# Patient Record
Sex: MALE | Race: WHITE | NOT HISPANIC OR LATINO | Employment: OTHER | URBAN - METROPOLITAN AREA
[De-identification: names, ages, dates, MRNs, and addresses within clinical notes are randomized per-mention and may not be internally consistent; named-entity substitution may affect disease eponyms.]

---

## 2017-06-20 ENCOUNTER — APPOINTMENT (EMERGENCY)
Dept: CT IMAGING | Facility: HOSPITAL | Age: 82
DRG: 871 | End: 2017-06-20
Payer: COMMERCIAL

## 2017-06-20 ENCOUNTER — APPOINTMENT (EMERGENCY)
Dept: RADIOLOGY | Facility: HOSPITAL | Age: 82
DRG: 871 | End: 2017-06-20
Payer: COMMERCIAL

## 2017-06-20 ENCOUNTER — HOSPITAL ENCOUNTER (INPATIENT)
Facility: HOSPITAL | Age: 82
LOS: 4 days | Discharge: HOME HEALTH | DRG: 871 | End: 2017-06-24
Attending: EMERGENCY MEDICINE | Admitting: INTERNAL MEDICINE
Payer: COMMERCIAL

## 2017-06-20 DIAGNOSIS — A41.9 SEVERE SEPSIS (HCC): Primary | ICD-10-CM

## 2017-06-20 DIAGNOSIS — J18.9 PNEUMONIA OF RIGHT UPPER LOBE DUE TO INFECTIOUS ORGANISM: ICD-10-CM

## 2017-06-20 DIAGNOSIS — N17.9 AKI (ACUTE KIDNEY INJURY) (HCC): ICD-10-CM

## 2017-06-20 DIAGNOSIS — R09.02 HYPOXIA: ICD-10-CM

## 2017-06-20 DIAGNOSIS — R65.20 SEVERE SEPSIS (HCC): Primary | ICD-10-CM

## 2017-06-20 PROBLEM — E78.5 HLD (HYPERLIPIDEMIA): Chronic | Status: ACTIVE | Noted: 2017-06-20

## 2017-06-20 PROBLEM — I10 ESSENTIAL HYPERTENSION: Chronic | Status: ACTIVE | Noted: 2017-06-20

## 2017-06-20 LAB
ALBUMIN SERPL BCP-MCNC: 3.2 G/DL (ref 3.5–5)
ALP SERPL-CCNC: 65 U/L (ref 46–116)
ALT SERPL W P-5'-P-CCNC: 25 U/L (ref 12–78)
ANION GAP SERPL CALCULATED.3IONS-SCNC: 10 MMOL/L (ref 4–13)
APTT PPP: 37 SECONDS (ref 23–35)
AST SERPL W P-5'-P-CCNC: 22 U/L (ref 5–45)
ATRIAL RATE: 117 BPM
BASOPHILS # BLD MANUAL: 0 THOUSAND/UL (ref 0–0.1)
BASOPHILS NFR MAR MANUAL: 0 % (ref 0–1)
BILIRUB DIRECT SERPL-MCNC: 0.25 MG/DL (ref 0–0.2)
BILIRUB SERPL-MCNC: 0.8 MG/DL (ref 0.2–1)
BUN SERPL-MCNC: 36 MG/DL (ref 5–25)
CALCIUM SERPL-MCNC: 9 MG/DL (ref 8.3–10.1)
CHLORIDE SERPL-SCNC: 100 MMOL/L (ref 100–108)
CO2 SERPL-SCNC: 25 MMOL/L (ref 21–32)
CREAT SERPL-MCNC: 2.07 MG/DL (ref 0.6–1.3)
EOSINOPHIL # BLD MANUAL: 0 THOUSAND/UL (ref 0–0.4)
EOSINOPHIL NFR BLD MANUAL: 0 % (ref 0–6)
ERYTHROCYTE [DISTWIDTH] IN BLOOD BY AUTOMATED COUNT: 12.8 % (ref 11.6–15.1)
GFR SERPL CREATININE-BSD FRML MDRD: 30.3 ML/MIN/1.73SQ M
GLUCOSE SERPL-MCNC: 101 MG/DL (ref 65–140)
HCT VFR BLD AUTO: 41.7 % (ref 36.5–49.3)
HGB BLD-MCNC: 14 G/DL (ref 12–17)
INR PPP: 1.02 (ref 0.86–1.16)
LACTATE SERPL-SCNC: 2 MMOL/L (ref 0.5–2)
LACTATE SERPL-SCNC: 2.2 MMOL/L (ref 0.5–2)
LYMPHOCYTES # BLD AUTO: 0 % (ref 14–44)
LYMPHOCYTES # BLD AUTO: 0 THOUSAND/UL (ref 0.6–4.47)
MCH RBC QN AUTO: 30.4 PG (ref 26.8–34.3)
MCHC RBC AUTO-ENTMCNC: 33.6 G/DL (ref 31.4–37.4)
MCV RBC AUTO: 91 FL (ref 82–98)
MONOCYTES # BLD AUTO: 0.28 THOUSAND/UL (ref 0–1.22)
MONOCYTES NFR BLD: 1 % (ref 4–12)
NEUTROPHILS # BLD MANUAL: 27.86 THOUSAND/UL (ref 1.85–7.62)
NEUTS BAND NFR BLD MANUAL: 14 % (ref 0–8)
NEUTS SEG NFR BLD AUTO: 85 % (ref 43–75)
NT-PROBNP SERPL-MCNC: 277 PG/ML
P AXIS: 41 DEGREES
PLATELET # BLD AUTO: 267 THOUSANDS/UL (ref 149–390)
PLATELET BLD QL SMEAR: ADEQUATE
PMV BLD AUTO: 10.3 FL (ref 8.9–12.7)
POTASSIUM SERPL-SCNC: 4.5 MMOL/L (ref 3.5–5.3)
PR INTERVAL: 152 MS
PROT SERPL-MCNC: 7.4 G/DL (ref 6.4–8.2)
PROTHROMBIN TIME: 13.7 SECONDS (ref 12.1–14.4)
QRS AXIS: 26 DEGREES
QRSD INTERVAL: 102 MS
QT INTERVAL: 320 MS
QTC INTERVAL: 412 MS
RBC # BLD AUTO: 4.6 MILLION/UL (ref 3.88–5.62)
SODIUM SERPL-SCNC: 135 MMOL/L (ref 136–145)
T WAVE AXIS: 32 DEGREES
TOTAL CELLS COUNTED SPEC: 100
TROPONIN I SERPL-MCNC: <0.02 NG/ML
VENTRICULAR RATE: 100 BPM
WBC # BLD AUTO: 28.14 THOUSAND/UL (ref 4.31–10.16)

## 2017-06-20 PROCEDURE — 85027 COMPLETE CBC AUTOMATED: CPT | Performed by: EMERGENCY MEDICINE

## 2017-06-20 PROCEDURE — 93005 ELECTROCARDIOGRAM TRACING: CPT | Performed by: EMERGENCY MEDICINE

## 2017-06-20 PROCEDURE — 94664 DEMO&/EVAL PT USE INHALER: CPT

## 2017-06-20 PROCEDURE — 36415 COLL VENOUS BLD VENIPUNCTURE: CPT | Performed by: EMERGENCY MEDICINE

## 2017-06-20 PROCEDURE — 83605 ASSAY OF LACTIC ACID: CPT | Performed by: EMERGENCY MEDICINE

## 2017-06-20 PROCEDURE — 85007 BL SMEAR W/DIFF WBC COUNT: CPT | Performed by: EMERGENCY MEDICINE

## 2017-06-20 PROCEDURE — 94668 MNPJ CHEST WALL SBSQ: CPT

## 2017-06-20 PROCEDURE — 96360 HYDRATION IV INFUSION INIT: CPT

## 2017-06-20 PROCEDURE — 85730 THROMBOPLASTIN TIME PARTIAL: CPT | Performed by: EMERGENCY MEDICINE

## 2017-06-20 PROCEDURE — 83880 ASSAY OF NATRIURETIC PEPTIDE: CPT | Performed by: EMERGENCY MEDICINE

## 2017-06-20 PROCEDURE — 87040 BLOOD CULTURE FOR BACTERIA: CPT | Performed by: EMERGENCY MEDICINE

## 2017-06-20 PROCEDURE — 80048 BASIC METABOLIC PNL TOTAL CA: CPT | Performed by: EMERGENCY MEDICINE

## 2017-06-20 PROCEDURE — 71020 HB CHEST X-RAY 2VW FRONTAL&LATL: CPT

## 2017-06-20 PROCEDURE — 84484 ASSAY OF TROPONIN QUANT: CPT | Performed by: EMERGENCY MEDICINE

## 2017-06-20 PROCEDURE — 99285 EMERGENCY DEPT VISIT HI MDM: CPT

## 2017-06-20 PROCEDURE — 94760 N-INVAS EAR/PLS OXIMETRY 1: CPT

## 2017-06-20 PROCEDURE — 96365 THER/PROPH/DIAG IV INF INIT: CPT

## 2017-06-20 PROCEDURE — 93005 ELECTROCARDIOGRAM TRACING: CPT

## 2017-06-20 PROCEDURE — 85610 PROTHROMBIN TIME: CPT | Performed by: EMERGENCY MEDICINE

## 2017-06-20 PROCEDURE — 70450 CT HEAD/BRAIN W/O DYE: CPT

## 2017-06-20 PROCEDURE — 80076 HEPATIC FUNCTION PANEL: CPT | Performed by: EMERGENCY MEDICINE

## 2017-06-20 RX ORDER — HEPARIN SODIUM 5000 [USP'U]/ML
5000 INJECTION, SOLUTION INTRAVENOUS; SUBCUTANEOUS EVERY 8 HOURS SCHEDULED
Status: DISCONTINUED | OUTPATIENT
Start: 2017-06-20 | End: 2017-06-24 | Stop reason: HOSPADM

## 2017-06-20 RX ORDER — ACETAMINOPHEN 325 MG/1
975 TABLET ORAL ONCE
Status: COMPLETED | OUTPATIENT
Start: 2017-06-20 | End: 2017-06-20

## 2017-06-20 RX ORDER — ACETAMINOPHEN 325 MG/1
650 TABLET ORAL EVERY 6 HOURS PRN
Status: DISCONTINUED | OUTPATIENT
Start: 2017-06-20 | End: 2017-06-24 | Stop reason: HOSPADM

## 2017-06-20 RX ORDER — LEVALBUTEROL 1.25 MG/.5ML
1.25 SOLUTION, CONCENTRATE RESPIRATORY (INHALATION) EVERY 6 HOURS PRN
Status: DISCONTINUED | OUTPATIENT
Start: 2017-06-20 | End: 2017-06-20

## 2017-06-20 RX ORDER — PRAVASTATIN SODIUM 40 MG
40 TABLET ORAL
Status: DISCONTINUED | OUTPATIENT
Start: 2017-06-20 | End: 2017-06-24 | Stop reason: HOSPADM

## 2017-06-20 RX ORDER — ALBUTEROL SULFATE 2.5 MG/3ML
2.5 SOLUTION RESPIRATORY (INHALATION) EVERY 6 HOURS PRN
Status: DISCONTINUED | OUTPATIENT
Start: 2017-06-20 | End: 2017-06-24 | Stop reason: HOSPADM

## 2017-06-20 RX ORDER — AMLODIPINE BESYLATE 5 MG/1
5 TABLET ORAL DAILY
Status: DISCONTINUED | OUTPATIENT
Start: 2017-06-21 | End: 2017-06-23

## 2017-06-20 RX ORDER — SODIUM CHLORIDE 9 MG/ML
20 INJECTION, SOLUTION INTRAVENOUS CONTINUOUS
Status: DISCONTINUED | OUTPATIENT
Start: 2017-06-20 | End: 2017-06-23

## 2017-06-20 RX ORDER — CHLORAL HYDRATE 500 MG
1000 CAPSULE ORAL DAILY
Status: DISCONTINUED | OUTPATIENT
Start: 2017-06-21 | End: 2017-06-24 | Stop reason: HOSPADM

## 2017-06-20 RX ORDER — GUAIFENESIN 600 MG
1200 TABLET, EXTENDED RELEASE 12 HR ORAL EVERY 12 HOURS SCHEDULED
Status: DISCONTINUED | OUTPATIENT
Start: 2017-06-20 | End: 2017-06-21

## 2017-06-20 RX ORDER — PANTOPRAZOLE SODIUM 20 MG/1
20 TABLET, DELAYED RELEASE ORAL
Status: DISCONTINUED | OUTPATIENT
Start: 2017-06-21 | End: 2017-06-24 | Stop reason: HOSPADM

## 2017-06-20 RX ORDER — ASPIRIN 81 MG/1
81 TABLET, CHEWABLE ORAL DAILY
Status: DISCONTINUED | OUTPATIENT
Start: 2017-06-21 | End: 2017-06-24 | Stop reason: HOSPADM

## 2017-06-20 RX ORDER — ONDANSETRON 2 MG/ML
4 INJECTION INTRAMUSCULAR; INTRAVENOUS EVERY 6 HOURS PRN
Status: DISCONTINUED | OUTPATIENT
Start: 2017-06-20 | End: 2017-06-24 | Stop reason: HOSPADM

## 2017-06-20 RX ADMIN — HEPARIN SODIUM 5000 UNITS: 5000 INJECTION, SOLUTION INTRAVENOUS; SUBCUTANEOUS at 17:30

## 2017-06-20 RX ADMIN — ACETAMINOPHEN 975 MG: 325 TABLET, FILM COATED ORAL at 14:05

## 2017-06-20 RX ADMIN — HEPARIN SODIUM 5000 UNITS: 5000 INJECTION, SOLUTION INTRAVENOUS; SUBCUTANEOUS at 21:11

## 2017-06-20 RX ADMIN — SODIUM CHLORIDE 1000 ML: 0.9 INJECTION, SOLUTION INTRAVENOUS at 14:00

## 2017-06-20 RX ADMIN — PRAVASTATIN SODIUM 40 MG: 40 TABLET ORAL at 17:30

## 2017-06-20 RX ADMIN — CEFTRIAXONE SODIUM 1000 MG: 10 INJECTION, POWDER, FOR SOLUTION INTRAVENOUS at 14:05

## 2017-06-20 RX ADMIN — AZITHROMYCIN MONOHYDRATE 500 MG: 500 INJECTION, POWDER, LYOPHILIZED, FOR SOLUTION INTRAVENOUS at 14:50

## 2017-06-20 RX ADMIN — SODIUM CHLORIDE 100 ML/HR: 0.9 INJECTION, SOLUTION INTRAVENOUS at 17:31

## 2017-06-20 RX ADMIN — GUAIFENESIN 1200 MG: 600 TABLET, EXTENDED RELEASE ORAL at 21:11

## 2017-06-20 RX ADMIN — SODIUM CHLORIDE 1000 ML: 0.9 INJECTION, SOLUTION INTRAVENOUS at 13:40

## 2017-06-21 ENCOUNTER — APPOINTMENT (INPATIENT)
Dept: OCCUPATIONAL THERAPY | Facility: HOSPITAL | Age: 82
DRG: 871 | End: 2017-06-21
Payer: COMMERCIAL

## 2017-06-21 LAB
ANION GAP SERPL CALCULATED.3IONS-SCNC: 12 MMOL/L (ref 4–13)
BASOPHILS # BLD AUTO: 0.03 THOUSANDS/ΜL (ref 0–0.1)
BASOPHILS NFR BLD AUTO: 0 % (ref 0–1)
BUN SERPL-MCNC: 36 MG/DL (ref 5–25)
CALCIUM SERPL-MCNC: 8 MG/DL (ref 8.3–10.1)
CHLORIDE SERPL-SCNC: 104 MMOL/L (ref 100–108)
CO2 SERPL-SCNC: 20 MMOL/L (ref 21–32)
CREAT SERPL-MCNC: 1.82 MG/DL (ref 0.6–1.3)
EOSINOPHIL # BLD AUTO: 0.14 THOUSAND/ΜL (ref 0–0.61)
EOSINOPHIL NFR BLD AUTO: 1 % (ref 0–6)
ERYTHROCYTE [DISTWIDTH] IN BLOOD BY AUTOMATED COUNT: 13 % (ref 11.6–15.1)
GFR SERPL CREATININE-BSD FRML MDRD: 35.2 ML/MIN/1.73SQ M
GLUCOSE SERPL-MCNC: 102 MG/DL (ref 65–140)
HCT VFR BLD AUTO: 37.1 % (ref 36.5–49.3)
HGB BLD-MCNC: 12.4 G/DL (ref 12–17)
L PNEUMO1 AG UR QL IA.RAPID: NEGATIVE
LYMPHOCYTES # BLD AUTO: 2.53 THOUSANDS/ΜL (ref 0.6–4.47)
LYMPHOCYTES NFR BLD AUTO: 11 % (ref 14–44)
MCH RBC QN AUTO: 30.6 PG (ref 26.8–34.3)
MCHC RBC AUTO-ENTMCNC: 33.4 G/DL (ref 31.4–37.4)
MCV RBC AUTO: 92 FL (ref 82–98)
MONOCYTES # BLD AUTO: 1.09 THOUSAND/ΜL (ref 0.17–1.22)
MONOCYTES NFR BLD AUTO: 5 % (ref 4–12)
NEUTROPHILS # BLD AUTO: 19.22 THOUSANDS/ΜL (ref 1.85–7.62)
NEUTS SEG NFR BLD AUTO: 83 % (ref 43–75)
PLATELET # BLD AUTO: 221 THOUSANDS/UL (ref 149–390)
PMV BLD AUTO: 10.4 FL (ref 8.9–12.7)
POTASSIUM SERPL-SCNC: 4.1 MMOL/L (ref 3.5–5.3)
RBC # BLD AUTO: 4.05 MILLION/UL (ref 3.88–5.62)
S PNEUM AG UR QL: NEGATIVE
SODIUM SERPL-SCNC: 136 MMOL/L (ref 136–145)
WBC # BLD AUTO: 23.01 THOUSAND/UL (ref 4.31–10.16)

## 2017-06-21 PROCEDURE — 97535 SELF CARE MNGMENT TRAINING: CPT

## 2017-06-21 PROCEDURE — G8979 MOBILITY GOAL STATUS: HCPCS

## 2017-06-21 PROCEDURE — 85025 COMPLETE CBC W/AUTO DIFF WBC: CPT | Performed by: PHYSICIAN ASSISTANT

## 2017-06-21 PROCEDURE — 80048 BASIC METABOLIC PNL TOTAL CA: CPT | Performed by: PHYSICIAN ASSISTANT

## 2017-06-21 PROCEDURE — 97116 GAIT TRAINING THERAPY: CPT

## 2017-06-21 PROCEDURE — 87449 NOS EACH ORGANISM AG IA: CPT | Performed by: PHYSICIAN ASSISTANT

## 2017-06-21 PROCEDURE — 97163 PT EVAL HIGH COMPLEX 45 MIN: CPT

## 2017-06-21 PROCEDURE — G8988 SELF CARE GOAL STATUS: HCPCS

## 2017-06-21 PROCEDURE — G8978 MOBILITY CURRENT STATUS: HCPCS

## 2017-06-21 PROCEDURE — G8987 SELF CARE CURRENT STATUS: HCPCS

## 2017-06-21 RX ORDER — GUAIFENESIN/DEXTROMETHORPHAN 100-10MG/5
10 SYRUP ORAL EVERY 6 HOURS
Status: DISCONTINUED | OUTPATIENT
Start: 2017-06-21 | End: 2017-06-24 | Stop reason: HOSPADM

## 2017-06-21 RX ADMIN — HEPARIN SODIUM 5000 UNITS: 5000 INJECTION, SOLUTION INTRAVENOUS; SUBCUTANEOUS at 05:33

## 2017-06-21 RX ADMIN — PRAVASTATIN SODIUM 40 MG: 40 TABLET ORAL at 16:14

## 2017-06-21 RX ADMIN — GUAIFENESIN 1200 MG: 600 TABLET, EXTENDED RELEASE ORAL at 08:26

## 2017-06-21 RX ADMIN — ACETAMINOPHEN 650 MG: 325 TABLET, FILM COATED ORAL at 11:47

## 2017-06-21 RX ADMIN — HEPARIN SODIUM 5000 UNITS: 5000 INJECTION, SOLUTION INTRAVENOUS; SUBCUTANEOUS at 14:20

## 2017-06-21 RX ADMIN — PANTOPRAZOLE SODIUM 20 MG: 20 TABLET, DELAYED RELEASE ORAL at 05:33

## 2017-06-21 RX ADMIN — Medication 1000 MG: at 08:25

## 2017-06-21 RX ADMIN — ASPIRIN 81 MG 81 MG: 81 TABLET ORAL at 08:25

## 2017-06-21 RX ADMIN — SODIUM CHLORIDE 100 ML/HR: 0.9 INJECTION, SOLUTION INTRAVENOUS at 10:57

## 2017-06-21 RX ADMIN — AMLODIPINE BESYLATE 5 MG: 5 TABLET ORAL at 08:25

## 2017-06-21 RX ADMIN — AZITHROMYCIN MONOHYDRATE 500 MG: 500 INJECTION, POWDER, LYOPHILIZED, FOR SOLUTION INTRAVENOUS at 12:41

## 2017-06-21 RX ADMIN — SODIUM CHLORIDE 100 ML/HR: 0.9 INJECTION, SOLUTION INTRAVENOUS at 00:50

## 2017-06-21 RX ADMIN — GUAIFENESIN AND DEXTROMETHORPHAN 10 ML: 100; 10 SYRUP ORAL at 18:36

## 2017-06-21 RX ADMIN — CEFTRIAXONE SODIUM 1000 MG: 10 INJECTION, POWDER, FOR SOLUTION INTRAVENOUS at 14:17

## 2017-06-21 RX ADMIN — HEPARIN SODIUM 5000 UNITS: 5000 INJECTION, SOLUTION INTRAVENOUS; SUBCUTANEOUS at 21:17

## 2017-06-21 RX ADMIN — SODIUM CHLORIDE 100 ML/HR: 0.9 INJECTION, SOLUTION INTRAVENOUS at 23:39

## 2017-06-22 LAB
ANION GAP SERPL CALCULATED.3IONS-SCNC: 10 MMOL/L (ref 4–13)
BASOPHILS # BLD AUTO: 0.03 THOUSANDS/ΜL (ref 0–0.1)
BASOPHILS NFR BLD AUTO: 0 % (ref 0–1)
BUN SERPL-MCNC: 24 MG/DL (ref 5–25)
CALCIUM SERPL-MCNC: 8.2 MG/DL (ref 8.3–10.1)
CHLORIDE SERPL-SCNC: 106 MMOL/L (ref 100–108)
CO2 SERPL-SCNC: 21 MMOL/L (ref 21–32)
CREAT SERPL-MCNC: 1.52 MG/DL (ref 0.6–1.3)
EOSINOPHIL # BLD AUTO: 0.24 THOUSAND/ΜL (ref 0–0.61)
EOSINOPHIL NFR BLD AUTO: 2 % (ref 0–6)
ERYTHROCYTE [DISTWIDTH] IN BLOOD BY AUTOMATED COUNT: 12.8 % (ref 11.6–15.1)
GFR SERPL CREATININE-BSD FRML MDRD: 43.3 ML/MIN/1.73SQ M
GLUCOSE SERPL-MCNC: 96 MG/DL (ref 65–140)
HCT VFR BLD AUTO: 33.2 % (ref 36.5–49.3)
HGB BLD-MCNC: 11.2 G/DL (ref 12–17)
LYMPHOCYTES # BLD AUTO: 1.88 THOUSANDS/ΜL (ref 0.6–4.47)
LYMPHOCYTES NFR BLD AUTO: 13 % (ref 14–44)
MCH RBC QN AUTO: 30.8 PG (ref 26.8–34.3)
MCHC RBC AUTO-ENTMCNC: 33.7 G/DL (ref 31.4–37.4)
MCV RBC AUTO: 91 FL (ref 82–98)
MONOCYTES # BLD AUTO: 1.05 THOUSAND/ΜL (ref 0.17–1.22)
MONOCYTES NFR BLD AUTO: 7 % (ref 4–12)
NEUTROPHILS # BLD AUTO: 10.99 THOUSANDS/ΜL (ref 1.85–7.62)
NEUTS SEG NFR BLD AUTO: 78 % (ref 43–75)
PLATELET # BLD AUTO: 205 THOUSANDS/UL (ref 149–390)
PMV BLD AUTO: 10.6 FL (ref 8.9–12.7)
POTASSIUM SERPL-SCNC: 3.8 MMOL/L (ref 3.5–5.3)
RBC # BLD AUTO: 3.64 MILLION/UL (ref 3.88–5.62)
SODIUM SERPL-SCNC: 137 MMOL/L (ref 136–145)
WBC # BLD AUTO: 14.19 THOUSAND/UL (ref 4.31–10.16)

## 2017-06-22 PROCEDURE — 80048 BASIC METABOLIC PNL TOTAL CA: CPT | Performed by: PHYSICIAN ASSISTANT

## 2017-06-22 PROCEDURE — 97530 THERAPEUTIC ACTIVITIES: CPT

## 2017-06-22 PROCEDURE — 85025 COMPLETE CBC W/AUTO DIFF WBC: CPT | Performed by: PHYSICIAN ASSISTANT

## 2017-06-22 PROCEDURE — 97116 GAIT TRAINING THERAPY: CPT

## 2017-06-22 RX ORDER — SACCHAROMYCES BOULARDII 250 MG
250 CAPSULE ORAL 2 TIMES DAILY
Status: DISCONTINUED | OUTPATIENT
Start: 2017-06-22 | End: 2017-06-24 | Stop reason: HOSPADM

## 2017-06-22 RX ADMIN — PRAVASTATIN SODIUM 40 MG: 40 TABLET ORAL at 17:43

## 2017-06-22 RX ADMIN — Medication 1000 MG: at 08:30

## 2017-06-22 RX ADMIN — HEPARIN SODIUM 5000 UNITS: 5000 INJECTION, SOLUTION INTRAVENOUS; SUBCUTANEOUS at 21:41

## 2017-06-22 RX ADMIN — AMLODIPINE BESYLATE 5 MG: 5 TABLET ORAL at 08:30

## 2017-06-22 RX ADMIN — GUAIFENESIN AND DEXTROMETHORPHAN 10 ML: 100; 10 SYRUP ORAL at 01:46

## 2017-06-22 RX ADMIN — ASPIRIN 81 MG 81 MG: 81 TABLET ORAL at 08:30

## 2017-06-22 RX ADMIN — PANTOPRAZOLE SODIUM 20 MG: 20 TABLET, DELAYED RELEASE ORAL at 06:12

## 2017-06-22 RX ADMIN — AZITHROMYCIN MONOHYDRATE 500 MG: 500 INJECTION, POWDER, LYOPHILIZED, FOR SOLUTION INTRAVENOUS at 12:26

## 2017-06-22 RX ADMIN — Medication 250 MG: at 21:41

## 2017-06-22 RX ADMIN — HEPARIN SODIUM 5000 UNITS: 5000 INJECTION, SOLUTION INTRAVENOUS; SUBCUTANEOUS at 06:12

## 2017-06-22 RX ADMIN — CEFTRIAXONE SODIUM 1000 MG: 10 INJECTION, POWDER, FOR SOLUTION INTRAVENOUS at 14:09

## 2017-06-22 RX ADMIN — GUAIFENESIN AND DEXTROMETHORPHAN 10 ML: 100; 10 SYRUP ORAL at 12:23

## 2017-06-22 RX ADMIN — GUAIFENESIN AND DEXTROMETHORPHAN 10 ML: 100; 10 SYRUP ORAL at 06:12

## 2017-06-22 RX ADMIN — GUAIFENESIN AND DEXTROMETHORPHAN 10 ML: 100; 10 SYRUP ORAL at 17:43

## 2017-06-23 PROCEDURE — 97116 GAIT TRAINING THERAPY: CPT

## 2017-06-23 PROCEDURE — 97535 SELF CARE MNGMENT TRAINING: CPT

## 2017-06-23 PROCEDURE — 97530 THERAPEUTIC ACTIVITIES: CPT

## 2017-06-23 RX ORDER — AMLODIPINE BESYLATE 5 MG/1
5 TABLET ORAL DAILY
COMMUNITY
End: 2017-06-24 | Stop reason: HOSPADM

## 2017-06-23 RX ORDER — HYDROCHLOROTHIAZIDE 25 MG/1
25 TABLET ORAL DAILY
COMMUNITY
End: 2017-06-24 | Stop reason: HOSPADM

## 2017-06-23 RX ORDER — ASPIRIN 81 MG/1
81 TABLET ORAL DAILY
COMMUNITY

## 2017-06-23 RX ORDER — AMLODIPINE BESYLATE 5 MG/1
5 TABLET ORAL ONCE
Status: COMPLETED | OUTPATIENT
Start: 2017-06-23 | End: 2017-06-23

## 2017-06-23 RX ORDER — PRAVASTATIN SODIUM 40 MG
40 TABLET ORAL DAILY
COMMUNITY
End: 2021-03-06 | Stop reason: ALTCHOICE

## 2017-06-23 RX ORDER — AMLODIPINE BESYLATE 10 MG/1
10 TABLET ORAL DAILY
Status: DISCONTINUED | OUTPATIENT
Start: 2017-06-24 | End: 2017-06-24 | Stop reason: HOSPADM

## 2017-06-23 RX ORDER — OMEGA-3-ACID ETHYL ESTERS 1 G/1
2 CAPSULE, LIQUID FILLED ORAL 2 TIMES DAILY
COMMUNITY
End: 2018-08-21 | Stop reason: SDUPTHER

## 2017-06-23 RX ORDER — LISINOPRIL 20 MG/1
20 TABLET ORAL 2 TIMES DAILY
COMMUNITY
End: 2017-06-24 | Stop reason: HOSPADM

## 2017-06-23 RX ORDER — DIPHENOXYLATE HYDROCHLORIDE AND ATROPINE SULFATE 2.5; .025 MG/1; MG/1
1 TABLET ORAL DAILY
COMMUNITY
End: 2021-01-10

## 2017-06-23 RX ORDER — OMEPRAZOLE 20 MG/1
20 TABLET, DELAYED RELEASE ORAL DAILY
Status: ON HOLD | COMMUNITY
End: 2018-06-27

## 2017-06-23 RX ADMIN — ACETAMINOPHEN 650 MG: 325 TABLET, FILM COATED ORAL at 15:45

## 2017-06-23 RX ADMIN — Medication 250 MG: at 17:23

## 2017-06-23 RX ADMIN — SODIUM CHLORIDE 20 ML/HR: 0.9 INJECTION, SOLUTION INTRAVENOUS at 06:19

## 2017-06-23 RX ADMIN — GUAIFENESIN AND DEXTROMETHORPHAN 10 ML: 100; 10 SYRUP ORAL at 17:38

## 2017-06-23 RX ADMIN — GUAIFENESIN AND DEXTROMETHORPHAN 10 ML: 100; 10 SYRUP ORAL at 12:37

## 2017-06-23 RX ADMIN — HEPARIN SODIUM 5000 UNITS: 5000 INJECTION, SOLUTION INTRAVENOUS; SUBCUTANEOUS at 14:33

## 2017-06-23 RX ADMIN — PRAVASTATIN SODIUM 40 MG: 40 TABLET ORAL at 15:45

## 2017-06-23 RX ADMIN — ASPIRIN 81 MG 81 MG: 81 TABLET ORAL at 08:37

## 2017-06-23 RX ADMIN — CEFTRIAXONE SODIUM 1000 MG: 10 INJECTION, POWDER, FOR SOLUTION INTRAVENOUS at 13:33

## 2017-06-23 RX ADMIN — AMLODIPINE BESYLATE 5 MG: 5 TABLET ORAL at 10:59

## 2017-06-23 RX ADMIN — AZITHROMYCIN MONOHYDRATE 500 MG: 500 INJECTION, POWDER, LYOPHILIZED, FOR SOLUTION INTRAVENOUS at 12:37

## 2017-06-23 RX ADMIN — PANTOPRAZOLE SODIUM 20 MG: 20 TABLET, DELAYED RELEASE ORAL at 06:09

## 2017-06-23 RX ADMIN — Medication 250 MG: at 08:37

## 2017-06-23 RX ADMIN — GUAIFENESIN AND DEXTROMETHORPHAN 10 ML: 100; 10 SYRUP ORAL at 06:09

## 2017-06-23 RX ADMIN — HEPARIN SODIUM 5000 UNITS: 5000 INJECTION, SOLUTION INTRAVENOUS; SUBCUTANEOUS at 06:09

## 2017-06-23 RX ADMIN — HEPARIN SODIUM 5000 UNITS: 5000 INJECTION, SOLUTION INTRAVENOUS; SUBCUTANEOUS at 21:18

## 2017-06-23 RX ADMIN — AMLODIPINE BESYLATE 5 MG: 5 TABLET ORAL at 08:37

## 2017-06-23 RX ADMIN — Medication 1000 MG: at 08:37

## 2017-06-24 VITALS
HEIGHT: 68 IN | SYSTOLIC BLOOD PRESSURE: 145 MMHG | BODY MASS INDEX: 23.52 KG/M2 | HEART RATE: 100 BPM | WEIGHT: 155.2 LBS | OXYGEN SATURATION: 95 % | DIASTOLIC BLOOD PRESSURE: 86 MMHG | RESPIRATION RATE: 18 BRPM | TEMPERATURE: 98.6 F

## 2017-06-24 LAB
ANION GAP SERPL CALCULATED.3IONS-SCNC: 10 MMOL/L (ref 4–13)
BASOPHILS # BLD AUTO: 0.04 THOUSANDS/ΜL (ref 0–0.1)
BASOPHILS NFR BLD AUTO: 0 % (ref 0–1)
BUN SERPL-MCNC: 20 MG/DL (ref 5–25)
CALCIUM SERPL-MCNC: 8.8 MG/DL (ref 8.3–10.1)
CHLORIDE SERPL-SCNC: 103 MMOL/L (ref 100–108)
CO2 SERPL-SCNC: 23 MMOL/L (ref 21–32)
CREAT SERPL-MCNC: 1.45 MG/DL (ref 0.6–1.3)
EOSINOPHIL # BLD AUTO: 0.43 THOUSAND/ΜL (ref 0–0.61)
EOSINOPHIL NFR BLD AUTO: 4 % (ref 0–6)
ERYTHROCYTE [DISTWIDTH] IN BLOOD BY AUTOMATED COUNT: 12.6 % (ref 11.6–15.1)
GFR SERPL CREATININE-BSD FRML MDRD: 45.7 ML/MIN/1.73SQ M
GLUCOSE SERPL-MCNC: 97 MG/DL (ref 65–140)
HCT VFR BLD AUTO: 35.7 % (ref 36.5–49.3)
HGB BLD-MCNC: 12.1 G/DL (ref 12–17)
LYMPHOCYTES # BLD AUTO: 1.68 THOUSANDS/ΜL (ref 0.6–4.47)
LYMPHOCYTES NFR BLD AUTO: 14 % (ref 14–44)
MCH RBC QN AUTO: 30.5 PG (ref 26.8–34.3)
MCHC RBC AUTO-ENTMCNC: 33.9 G/DL (ref 31.4–37.4)
MCV RBC AUTO: 90 FL (ref 82–98)
MONOCYTES # BLD AUTO: 0.79 THOUSAND/ΜL (ref 0.17–1.22)
MONOCYTES NFR BLD AUTO: 7 % (ref 4–12)
NEUTROPHILS # BLD AUTO: 9.07 THOUSANDS/ΜL (ref 1.85–7.62)
NEUTS SEG NFR BLD AUTO: 75 % (ref 43–75)
PLATELET # BLD AUTO: 258 THOUSANDS/UL (ref 149–390)
PMV BLD AUTO: 9.4 FL (ref 8.9–12.7)
POTASSIUM SERPL-SCNC: 3.8 MMOL/L (ref 3.5–5.3)
RBC # BLD AUTO: 3.97 MILLION/UL (ref 3.88–5.62)
SODIUM SERPL-SCNC: 136 MMOL/L (ref 136–145)
WBC # BLD AUTO: 12.01 THOUSAND/UL (ref 4.31–10.16)

## 2017-06-24 PROCEDURE — 85025 COMPLETE CBC W/AUTO DIFF WBC: CPT | Performed by: INTERNAL MEDICINE

## 2017-06-24 PROCEDURE — 80048 BASIC METABOLIC PNL TOTAL CA: CPT | Performed by: INTERNAL MEDICINE

## 2017-06-24 RX ORDER — GUAIFENESIN/DEXTROMETHORPHAN 100-10MG/5
10 SYRUP ORAL EVERY 6 HOURS
Qty: 118 ML | Refills: 0 | Status: SHIPPED | OUTPATIENT
Start: 2017-06-24 | End: 2018-08-21 | Stop reason: ALTCHOICE

## 2017-06-24 RX ORDER — AZITHROMYCIN 500 MG/1
500 TABLET, FILM COATED ORAL DAILY
Qty: 4 TABLET | Refills: 0 | Status: SHIPPED | OUTPATIENT
Start: 2017-06-24 | End: 2017-06-28

## 2017-06-24 RX ORDER — AMLODIPINE BESYLATE 10 MG/1
10 TABLET ORAL DAILY
Qty: 30 TABLET | Refills: 0 | Status: SHIPPED | OUTPATIENT
Start: 2017-06-24 | End: 2020-04-06 | Stop reason: SDUPTHER

## 2017-06-24 RX ORDER — CEFDINIR 300 MG/1
300 CAPSULE ORAL EVERY 12 HOURS
Qty: 12 CAPSULE | Refills: 0 | Status: SHIPPED | OUTPATIENT
Start: 2017-06-24 | End: 2017-06-30

## 2017-06-24 RX ORDER — ALBUTEROL SULFATE 2.5 MG/3ML
2.5 SOLUTION RESPIRATORY (INHALATION) EVERY 6 HOURS PRN
Qty: 75 ML | Refills: 0 | Status: SHIPPED | OUTPATIENT
Start: 2017-06-24 | End: 2018-08-21 | Stop reason: ALTCHOICE

## 2017-06-24 RX ORDER — PANTOPRAZOLE SODIUM 20 MG/1
20 TABLET, DELAYED RELEASE ORAL
Qty: 30 TABLET | Refills: 0 | Status: SHIPPED | OUTPATIENT
Start: 2017-06-24 | End: 2018-08-21 | Stop reason: ALTCHOICE

## 2017-06-24 RX ORDER — CHLORAL HYDRATE 500 MG
1000 CAPSULE ORAL DAILY
Qty: 30 CAPSULE | Refills: 0 | Status: SHIPPED | OUTPATIENT
Start: 2017-06-24 | End: 2020-01-13 | Stop reason: ALTCHOICE

## 2017-06-24 RX ADMIN — PANTOPRAZOLE SODIUM 20 MG: 20 TABLET, DELAYED RELEASE ORAL at 06:00

## 2017-06-24 RX ADMIN — AZITHROMYCIN MONOHYDRATE 500 MG: 500 INJECTION, POWDER, LYOPHILIZED, FOR SOLUTION INTRAVENOUS at 12:55

## 2017-06-24 RX ADMIN — GUAIFENESIN AND DEXTROMETHORPHAN 10 ML: 100; 10 SYRUP ORAL at 12:55

## 2017-06-24 RX ADMIN — GUAIFENESIN AND DEXTROMETHORPHAN 10 ML: 100; 10 SYRUP ORAL at 01:20

## 2017-06-24 RX ADMIN — Medication 250 MG: at 08:45

## 2017-06-24 RX ADMIN — ASPIRIN 81 MG 81 MG: 81 TABLET ORAL at 08:45

## 2017-06-24 RX ADMIN — AMLODIPINE BESYLATE 10 MG: 10 TABLET ORAL at 08:45

## 2017-06-24 RX ADMIN — HEPARIN SODIUM 5000 UNITS: 5000 INJECTION, SOLUTION INTRAVENOUS; SUBCUTANEOUS at 06:00

## 2017-06-24 RX ADMIN — GUAIFENESIN AND DEXTROMETHORPHAN 10 ML: 100; 10 SYRUP ORAL at 06:00

## 2017-06-24 RX ADMIN — CEFTRIAXONE SODIUM 1000 MG: 10 INJECTION, POWDER, FOR SOLUTION INTRAVENOUS at 14:31

## 2017-06-24 RX ADMIN — Medication 1000 MG: at 08:45

## 2017-06-25 LAB
BACTERIA BLD CULT: NORMAL
BACTERIA BLD CULT: NORMAL

## 2017-12-06 ENCOUNTER — ALLSCRIPTS OFFICE VISIT (OUTPATIENT)
Dept: OTHER | Facility: OTHER | Age: 82
End: 2017-12-06

## 2018-01-22 VITALS
HEART RATE: 60 BPM | SYSTOLIC BLOOD PRESSURE: 160 MMHG | DIASTOLIC BLOOD PRESSURE: 80 MMHG | WEIGHT: 152.38 LBS | HEIGHT: 66 IN | TEMPERATURE: 97.1 F | RESPIRATION RATE: 16 BRPM | OXYGEN SATURATION: 95 % | BODY MASS INDEX: 24.49 KG/M2

## 2018-01-23 NOTE — PROGRESS NOTES
Chief Complaint  PT is here for life care assessment  Patient Care Team    Care Team Member Role Specialty Office Number   Clint Harding MD  Cardiology [phone]   [facility]  Pulmonary Medicine [phone]     Active Problems    1  Bradycardia (427 89) (R00 1)   2  Carotid artery stenosis (433 10) (I65 29)   3  GERD (gastroesophageal reflux disease) (530 81) (K21 9)   4  Hyperlipidemia (272 4) (E78 5)   5  Hypertension (401 9) (I10)   6  Premature atrial contractions (427 61) (I49 1)   7  Sinus bradycardia (427 89) (R00 1)    Past Medical History    · History of Acute kidney injury (584 9) (N17 9)   · History of Community acquired pneumonia (5) (J18 9)   · History of malignant neoplasm of prostate (V10 46) (Z85 46)   · History of shortness of breath (V13 89) (D17 233)   · History of transient cerebral ischemia (V12 54) (Z86 73)    Family History  Mother    · Family history of hypertension (V17 49) (Z82 49)    Social History    · Former smoker    Current Meds   1  AmLODIPine Besylate 5 MG Oral Tablet; Take 1 tablet daily; Therapy: (Recorded:20Apr2015) to Recorded   2  Aspirin 81 MG TABS; TAKE 1 TABLET DAILY; Therapy: (Recorded:20Apr2015) to Recorded   3  Fish Oil CAPS; take 1 capsule daily; Therapy: (Recorded:20Apr2015) to Recorded   4  HydroCHLOROthiazide 25 MG Oral Tablet; Therapy: (Recorded:20Apr2015) to Recorded   5  Lisinopril 20 MG Oral Tablet; Take 1 tablet daily; Therapy: (Recorded:[id number]) to Recorded   6  Omeprazole 20 MG Oral Tablet Delayed Release; Therapy: (Recorded:43Joo5188) to Recorded   7  Pravastatin Sodium 40 MG Oral Tablet; Take 1 tablet daily; Therapy: (Recorded:29Rsu5307) to Recorded    Allergies    1   No Known Drug Allergies    Vitals  Signs    Temperature: 97 1 F, Tympanic  Heart Rate: 60, R Radial  Respiration: 16  Systolic: 744, LUE, Sitting  Diastolic: 80, LUE, Sitting  Height: 5 ft 6 in  Weight: 152 lb 6 oz  BMI Calculated: 24 59  BSA Calculated: 1 78  O2 Saturation: 95    Signatures   Electronically signed by : Chelsy Emanuel DO; Dec  6 2017  4:05PM EST                       (Author)

## 2018-02-28 ENCOUNTER — APPOINTMENT (OUTPATIENT)
Dept: RADIOLOGY | Age: 83
End: 2018-02-28
Payer: COMMERCIAL

## 2018-02-28 ENCOUNTER — TRANSCRIBE ORDERS (OUTPATIENT)
Dept: ADMINISTRATIVE | Age: 83
End: 2018-02-28

## 2018-02-28 DIAGNOSIS — M54.10 RADICULITIS WITH LOWER EXTREMITY SYMPTOMS: Primary | ICD-10-CM

## 2018-02-28 DIAGNOSIS — M54.10 RADICULITIS WITH LOWER EXTREMITY SYMPTOMS: ICD-10-CM

## 2018-02-28 PROCEDURE — 72110 X-RAY EXAM L-2 SPINE 4/>VWS: CPT

## 2018-06-25 ENCOUNTER — HOSPITAL ENCOUNTER (OUTPATIENT)
Facility: HOSPITAL | Age: 83
Setting detail: OBSERVATION
Discharge: HOME | End: 2018-06-27
Attending: EMERGENCY MEDICINE | Admitting: INTERNAL MEDICINE
Payer: COMMERCIAL

## 2018-06-25 ENCOUNTER — APPOINTMENT (EMERGENCY)
Dept: RADIOLOGY | Facility: HOSPITAL | Age: 83
End: 2018-06-25
Payer: COMMERCIAL

## 2018-06-25 ENCOUNTER — APPOINTMENT (EMERGENCY)
Dept: CT IMAGING | Facility: HOSPITAL | Age: 83
End: 2018-06-25
Payer: COMMERCIAL

## 2018-06-25 DIAGNOSIS — R55 NEAR SYNCOPE: ICD-10-CM

## 2018-06-25 DIAGNOSIS — I10 ESSENTIAL HYPERTENSION: Chronic | ICD-10-CM

## 2018-06-25 DIAGNOSIS — N17.9 AKI (ACUTE KIDNEY INJURY) (HCC): Primary | ICD-10-CM

## 2018-06-25 LAB
ALBUMIN SERPL BCP-MCNC: 3.4 G/DL (ref 3.5–5)
ALP SERPL-CCNC: 72 U/L (ref 46–116)
ALT SERPL W P-5'-P-CCNC: 30 U/L (ref 12–78)
ANION GAP SERPL CALCULATED.3IONS-SCNC: 9 MMOL/L (ref 4–13)
AST SERPL W P-5'-P-CCNC: 28 U/L (ref 5–45)
BASOPHILS # BLD AUTO: 0.04 THOUSANDS/ΜL (ref 0–0.1)
BASOPHILS NFR BLD AUTO: 0 % (ref 0–1)
BILIRUB SERPL-MCNC: 0.5 MG/DL (ref 0.2–1)
BILIRUB UR QL STRIP: NEGATIVE
BUN SERPL-MCNC: 37 MG/DL (ref 5–25)
CALCIUM SERPL-MCNC: 8.8 MG/DL (ref 8.3–10.1)
CHLORIDE SERPL-SCNC: 100 MMOL/L (ref 100–108)
CLARITY UR: CLEAR
CO2 SERPL-SCNC: 27 MMOL/L (ref 21–32)
COLOR UR: YELLOW
CREAT SERPL-MCNC: 1.79 MG/DL (ref 0.6–1.3)
EOSINOPHIL # BLD AUTO: 0.32 THOUSAND/ΜL (ref 0–0.61)
EOSINOPHIL NFR BLD AUTO: 2 % (ref 0–6)
ERYTHROCYTE [DISTWIDTH] IN BLOOD BY AUTOMATED COUNT: 13 % (ref 11.6–15.1)
GFR SERPL CREATININE-BSD FRML MDRD: 32 ML/MIN/1.73SQ M
GLUCOSE SERPL-MCNC: 124 MG/DL (ref 65–140)
GLUCOSE UR STRIP-MCNC: NEGATIVE MG/DL
HCT VFR BLD AUTO: 42.1 % (ref 36.5–49.3)
HGB BLD-MCNC: 14.3 G/DL (ref 12–17)
HGB UR QL STRIP.AUTO: NEGATIVE
KETONES UR STRIP-MCNC: NEGATIVE MG/DL
LEUKOCYTE ESTERASE UR QL STRIP: NEGATIVE
LYMPHOCYTES # BLD AUTO: 1.88 THOUSANDS/ΜL (ref 0.6–4.47)
LYMPHOCYTES NFR BLD AUTO: 14 % (ref 14–44)
MCH RBC QN AUTO: 30.7 PG (ref 26.8–34.3)
MCHC RBC AUTO-ENTMCNC: 34 G/DL (ref 31.4–37.4)
MCV RBC AUTO: 90 FL (ref 82–98)
MONOCYTES # BLD AUTO: 0.94 THOUSAND/ΜL (ref 0.17–1.22)
MONOCYTES NFR BLD AUTO: 7 % (ref 4–12)
NEUTROPHILS # BLD AUTO: 10.25 THOUSANDS/ΜL (ref 1.85–7.62)
NEUTS SEG NFR BLD AUTO: 76 % (ref 43–75)
NITRITE UR QL STRIP: NEGATIVE
PH UR STRIP.AUTO: 6 [PH] (ref 4.5–8)
PLATELET # BLD AUTO: 255 THOUSANDS/UL (ref 149–390)
PMV BLD AUTO: 10.3 FL (ref 8.9–12.7)
POTASSIUM SERPL-SCNC: 3.7 MMOL/L (ref 3.5–5.3)
PROT SERPL-MCNC: 7.4 G/DL (ref 6.4–8.2)
PROT UR STRIP-MCNC: ABNORMAL MG/DL
RBC # BLD AUTO: 4.66 MILLION/UL (ref 3.88–5.62)
SODIUM SERPL-SCNC: 136 MMOL/L (ref 136–145)
SP GR UR STRIP.AUTO: 1.02 (ref 1–1.03)
TROPONIN I SERPL-MCNC: <0.02 NG/ML
UROBILINOGEN UR QL STRIP.AUTO: 1 E.U./DL
WBC # BLD AUTO: 13.43 THOUSAND/UL (ref 4.31–10.16)

## 2018-06-25 PROCEDURE — 81001 URINALYSIS AUTO W/SCOPE: CPT

## 2018-06-25 PROCEDURE — 36415 COLL VENOUS BLD VENIPUNCTURE: CPT | Performed by: PHYSICIAN ASSISTANT

## 2018-06-25 PROCEDURE — 71046 X-RAY EXAM CHEST 2 VIEWS: CPT

## 2018-06-25 PROCEDURE — 80053 COMPREHEN METABOLIC PANEL: CPT | Performed by: PHYSICIAN ASSISTANT

## 2018-06-25 PROCEDURE — 74176 CT ABD & PELVIS W/O CONTRAST: CPT

## 2018-06-25 PROCEDURE — 70450 CT HEAD/BRAIN W/O DYE: CPT

## 2018-06-25 PROCEDURE — 85025 COMPLETE CBC W/AUTO DIFF WBC: CPT | Performed by: PHYSICIAN ASSISTANT

## 2018-06-25 PROCEDURE — 84484 ASSAY OF TROPONIN QUANT: CPT | Performed by: PHYSICIAN ASSISTANT

## 2018-06-25 PROCEDURE — 93005 ELECTROCARDIOGRAM TRACING: CPT

## 2018-06-25 RX ORDER — BIOTIN 1 MG
5000 TABLET ORAL
COMMUNITY
End: 2021-03-06 | Stop reason: ALTCHOICE

## 2018-06-25 RX ORDER — LISINOPRIL 5 MG/1
5 TABLET ORAL DAILY
Status: ON HOLD | COMMUNITY
End: 2018-06-27

## 2018-06-25 RX ORDER — HYDROCHLOROTHIAZIDE 25 MG/1
25 TABLET ORAL DAILY
COMMUNITY
End: 2018-06-27 | Stop reason: HOSPADM

## 2018-06-25 RX ORDER — ASPIRIN 81 MG/1
81 TABLET, CHEWABLE ORAL ONCE
Status: COMPLETED | OUTPATIENT
Start: 2018-06-25 | End: 2018-06-25

## 2018-06-25 RX ADMIN — ASPIRIN 81 MG 81 MG: 81 TABLET ORAL at 21:11

## 2018-06-26 PROBLEM — R55 NEAR SYNCOPE: Status: ACTIVE | Noted: 2018-06-26

## 2018-06-26 PROBLEM — D72.829 LEUKOCYTOSIS: Status: ACTIVE | Noted: 2018-06-26

## 2018-06-26 LAB
ANION GAP SERPL CALCULATED.3IONS-SCNC: 9 MMOL/L (ref 4–13)
ATRIAL RATE: 93 BPM
BACTERIA UR QL AUTO: ABNORMAL /HPF
BUN SERPL-MCNC: 36 MG/DL (ref 5–25)
CALCIUM SERPL-MCNC: 8.4 MG/DL (ref 8.3–10.1)
CHLORIDE SERPL-SCNC: 101 MMOL/L (ref 100–108)
CO2 SERPL-SCNC: 27 MMOL/L (ref 21–32)
CREAT SERPL-MCNC: 1.71 MG/DL (ref 0.6–1.3)
ERYTHROCYTE [DISTWIDTH] IN BLOOD BY AUTOMATED COUNT: 12.9 % (ref 11.6–15.1)
GFR SERPL CREATININE-BSD FRML MDRD: 34 ML/MIN/1.73SQ M
GLUCOSE SERPL-MCNC: 102 MG/DL (ref 65–140)
HCT VFR BLD AUTO: 39.9 % (ref 36.5–49.3)
HGB BLD-MCNC: 13.4 G/DL (ref 12–17)
MCH RBC QN AUTO: 30.5 PG (ref 26.8–34.3)
MCHC RBC AUTO-ENTMCNC: 33.6 G/DL (ref 31.4–37.4)
MCV RBC AUTO: 91 FL (ref 82–98)
NON-SQ EPI CELLS URNS QL MICRO: ABNORMAL /HPF
P AXIS: 41 DEGREES
PLATELET # BLD AUTO: 250 THOUSANDS/UL (ref 149–390)
PLATELET # BLD AUTO: 260 THOUSANDS/UL (ref 149–390)
PMV BLD AUTO: 10.1 FL (ref 8.9–12.7)
PMV BLD AUTO: 9.5 FL (ref 8.9–12.7)
POTASSIUM SERPL-SCNC: 3.3 MMOL/L (ref 3.5–5.3)
PR INTERVAL: 132 MS
QRS AXIS: 26 DEGREES
QRSD INTERVAL: 120 MS
QT INTERVAL: 374 MS
QTC INTERVAL: 465 MS
RBC # BLD AUTO: 4.39 MILLION/UL (ref 3.88–5.62)
RBC #/AREA URNS AUTO: ABNORMAL /HPF
SODIUM SERPL-SCNC: 137 MMOL/L (ref 136–145)
T WAVE AXIS: 16 DEGREES
TROPONIN I SERPL-MCNC: 0.02 NG/ML
TROPONIN I SERPL-MCNC: <0.02 NG/ML
TROPONIN I SERPL-MCNC: <0.02 NG/ML
TSH SERPL DL<=0.05 MIU/L-ACNC: 2.69 UIU/ML (ref 0.36–3.74)
VENTRICULAR RATE: 93 BPM
WBC # BLD AUTO: 9.89 THOUSAND/UL (ref 4.31–10.16)
WBC #/AREA URNS AUTO: ABNORMAL /HPF

## 2018-06-26 PROCEDURE — G8979 MOBILITY GOAL STATUS: HCPCS

## 2018-06-26 PROCEDURE — 99220 PR INITIAL OBSERVATION CARE/DAY 70 MINUTES: CPT | Performed by: PHYSICIAN ASSISTANT

## 2018-06-26 PROCEDURE — 85027 COMPLETE CBC AUTOMATED: CPT | Performed by: PHYSICIAN ASSISTANT

## 2018-06-26 PROCEDURE — G8978 MOBILITY CURRENT STATUS: HCPCS

## 2018-06-26 PROCEDURE — 93010 ELECTROCARDIOGRAM REPORT: CPT | Performed by: INTERNAL MEDICINE

## 2018-06-26 PROCEDURE — 99285 EMERGENCY DEPT VISIT HI MDM: CPT

## 2018-06-26 PROCEDURE — 97162 PT EVAL MOD COMPLEX 30 MIN: CPT

## 2018-06-26 PROCEDURE — 84484 ASSAY OF TROPONIN QUANT: CPT | Performed by: PHYSICIAN ASSISTANT

## 2018-06-26 PROCEDURE — 87081 CULTURE SCREEN ONLY: CPT | Performed by: PHYSICIAN ASSISTANT

## 2018-06-26 PROCEDURE — 85049 AUTOMATED PLATELET COUNT: CPT | Performed by: PHYSICIAN ASSISTANT

## 2018-06-26 PROCEDURE — 84484 ASSAY OF TROPONIN QUANT: CPT | Performed by: INTERNAL MEDICINE

## 2018-06-26 PROCEDURE — 84443 ASSAY THYROID STIM HORMONE: CPT | Performed by: PHYSICIAN ASSISTANT

## 2018-06-26 PROCEDURE — 80048 BASIC METABOLIC PNL TOTAL CA: CPT | Performed by: PHYSICIAN ASSISTANT

## 2018-06-26 RX ORDER — AMLODIPINE BESYLATE 10 MG/1
10 TABLET ORAL DAILY
Status: DISCONTINUED | OUTPATIENT
Start: 2018-06-26 | End: 2018-06-27 | Stop reason: HOSPADM

## 2018-06-26 RX ORDER — HEPARIN SODIUM 5000 [USP'U]/ML
5000 INJECTION, SOLUTION INTRAVENOUS; SUBCUTANEOUS EVERY 8 HOURS SCHEDULED
Status: DISCONTINUED | OUTPATIENT
Start: 2018-06-26 | End: 2018-06-27 | Stop reason: HOSPADM

## 2018-06-26 RX ORDER — ASPIRIN 81 MG/1
81 TABLET ORAL DAILY
Status: DISCONTINUED | OUTPATIENT
Start: 2018-06-26 | End: 2018-06-27 | Stop reason: HOSPADM

## 2018-06-26 RX ORDER — ACETAMINOPHEN 325 MG/1
650 TABLET ORAL EVERY 6 HOURS PRN
Status: DISCONTINUED | OUTPATIENT
Start: 2018-06-26 | End: 2018-06-27 | Stop reason: HOSPADM

## 2018-06-26 RX ORDER — PRAVASTATIN SODIUM 40 MG
40 TABLET ORAL DAILY
Status: DISCONTINUED | OUTPATIENT
Start: 2018-06-26 | End: 2018-06-27 | Stop reason: HOSPADM

## 2018-06-26 RX ORDER — POTASSIUM CHLORIDE 20 MEQ/1
20 TABLET, EXTENDED RELEASE ORAL ONCE
Status: COMPLETED | OUTPATIENT
Start: 2018-06-26 | End: 2018-06-26

## 2018-06-26 RX ORDER — CHLORAL HYDRATE 500 MG
1000 CAPSULE ORAL DAILY
Status: DISCONTINUED | OUTPATIENT
Start: 2018-06-26 | End: 2018-06-27 | Stop reason: HOSPADM

## 2018-06-26 RX ORDER — PANTOPRAZOLE SODIUM 20 MG/1
20 TABLET, DELAYED RELEASE ORAL
Status: DISCONTINUED | OUTPATIENT
Start: 2018-06-26 | End: 2018-06-27 | Stop reason: HOSPADM

## 2018-06-26 RX ORDER — ONDANSETRON 2 MG/ML
4 INJECTION INTRAMUSCULAR; INTRAVENOUS EVERY 6 HOURS PRN
Status: DISCONTINUED | OUTPATIENT
Start: 2018-06-26 | End: 2018-06-27 | Stop reason: HOSPADM

## 2018-06-26 RX ADMIN — PANTOPRAZOLE SODIUM 20 MG: 20 TABLET, DELAYED RELEASE ORAL at 05:03

## 2018-06-26 RX ADMIN — ASPIRIN 81 MG: 81 TABLET, COATED ORAL at 08:43

## 2018-06-26 RX ADMIN — AMLODIPINE BESYLATE 10 MG: 10 TABLET ORAL at 08:43

## 2018-06-26 RX ADMIN — PRAVASTATIN SODIUM 40 MG: 40 TABLET ORAL at 08:43

## 2018-06-26 RX ADMIN — SODIUM CHLORIDE 500 ML: 0.9 INJECTION, SOLUTION INTRAVENOUS at 16:34

## 2018-06-26 RX ADMIN — Medication 1 TABLET: at 08:43

## 2018-06-26 RX ADMIN — Medication 1000 MG: at 08:43

## 2018-06-26 RX ADMIN — POTASSIUM CHLORIDE 20 MEQ: 1500 TABLET, EXTENDED RELEASE ORAL at 12:43

## 2018-06-26 RX ADMIN — HEPARIN SODIUM 5000 UNITS: 5000 INJECTION, SOLUTION INTRAVENOUS; SUBCUTANEOUS at 05:02

## 2018-06-26 RX ADMIN — HEPARIN SODIUM 5000 UNITS: 5000 INJECTION, SOLUTION INTRAVENOUS; SUBCUTANEOUS at 14:34

## 2018-06-26 RX ADMIN — ACETAMINOPHEN 650 MG: 325 TABLET, FILM COATED ORAL at 02:52

## 2018-06-26 RX ADMIN — HEPARIN SODIUM 5000 UNITS: 5000 INJECTION, SOLUTION INTRAVENOUS; SUBCUTANEOUS at 21:35

## 2018-06-26 NOTE — CASE MANAGEMENT
Initial Clinical Review    Thank you,  [address] [facility] in the [facility] by Aníbal Taveras for 2017  Network Utilization Review Department  Phone: [phone]; Fax [phone]  ATTENTION: The Network Utilization Review Department is now centralized for our 7 Facilities  Make a note that we have a new phone and fax numbers for our Department  Please call with any questions or concerns to [phone] and carefully follow the prompts so that you are directed to the right person  All voicemails are confidential  Fax any determinations, approvals, denials, and requests for initial or continue stay review clinical to [phone]  Due to HIGH CALL volume, it would be easier if you could please send faxed requests to expedite your requests and in part, help us provide discharge notifications faster  Admission: Date/Time/Statement: Placed in Observation status on 6/25 @ 23:46    Orders Placed This Encounter   Procedures    Place in Observation (expected length of stay for this patient is less than two midnights)     Standing Status:   Standing     Number of Occurrences:   1     Order Specific Question:   Admitting Physician     Answer:   Claudia Blanco [[id number]]     Order Specific Question:   Level of Care     Answer:   Med Surg [16]         ED: Date/Time/Mode of Arrival:   ED Arrival Information     Expected Arrival Acuity Means of Arrival Escorted By Service Admission Type    - 6/25/2018 19:26 Urgent Walk-In Self General Medicine Urgent    Arrival Complaint    -          Chief Complaint:   Chief Complaint   Patient presents with    Syncope     Change in BP medication today for Hypertension, was at living facility when he had a syncopable episiode  No complaints of pain or discomfort, A xO x4       History of Illness: Shoshana Carvajal is a 80 y o  male who presents with near syncope   Patient resides at [facility] in independent living and was sitting at dinner last evening when he began feeling unwell  He notes that people at his table were also commenting that he didn't look well and he states that he just wanted to get back to his room to rest  He reports feeling lightheaded and then was noted to be "out of it " He is unsure whether or not he lost full consciousness but notes that if he did it was only for a few seconds  He reports that staff at the facility checked his blood pressure and noted that it was high  He states that he was otherwise feeling well during the day yesterday  He does note that he was recently seen by his PCP and was started on lisinopril 5 mg daily for hypertension in addition to his HCTZ and amlodipine  He reportedly took the lisinopril yesterday afternoon  ED Vital Signs:   ED Triage Vitals   Temperature Pulse Respirations Blood Pressure SpO2   06/25/18 2311 06/25/18 1935 06/25/18 1935 06/25/18 1935 06/25/18 1935   97 9 °F (36 6 °C) 87 18 152/75 93 % RA sat       Temp Source Heart Rate Source Patient Position - Orthostatic VS BP Location FiO2 (%)   06/25/18 2311 06/25/18 1935 06/25/18 1935 06/25/18 1935 --   Oral Monitor Lying Right arm       Pain Score       06/26/18 0024       No Pain        Wt Readings from Last 1 Encounters:   06/26/18 66 6 kg (146 lb 13 2 oz)       Vital Signs (abnormal):  BP range 121/69 - 160/91   06/25/18 2109  --  100  --  121/69  --  --  --  Standing for 3 minutes - Orthostatic VS   06/25/18 2105  --  --  --  128/71  --  --  --  Standing - Orthostatic VS   06/25/18 2104  --  --  --  123/69  --  --  --  Sitting - Orthostatic VS   06/25/18 2103  --  87  --  128/70  --  94 %  None (Room air)  Lying - Orthostatic VS       Abnormal Labs/Diagnostic Test Results: 6/25 - Bun/cr 37/1 79 - Albumin 3 4 - Trop 0 02-0 02-0 02- Wbc 13 43  Urine  Protein 100  6/26 - K 3 3 - Bun/cr 36/1  71- CBC - normal     CT head - No acute intracranial abnormality   Microangiopathic changes   Chronic appearing right occipital lobe infarction is new from the prior study  CT A & P - Moderate gastric distention may be physiologic, especially if the patient has a recently   An element of gastroparesis however is not excluded  CXR - no acute       ED Treatment:   Medication Administration from 06/25/2018 1926 to 06/26/2018 0049       Date/Time Order Dose Route Action     06/25/2018 2111 aspirin chewable tablet 81 mg 81 mg Oral Given       Past Medical/Surgical History:   Diagnosis    Hyperlipidemia    Hypertension    Stroke [facility])       Admitting Diagnosis: Syncope [R55]  HANNA (acute kidney injury) ([facility] [address] ) [N17 9]  Near syncope [R55]    Age/Sex: 80 y o  male    Assessment/Plan:   Near syncope   Assessment & Plan     · Presented with lightheadedness, episode of near-syncope vs syncope  · Orthostatics obtained in ED were negative  Continue to monitor orthostatics  · Monitor on telemetry for arrhythmias  · CT head: no acute intracranial abnormality  Microangiopathic changes  Chronic appearing right occipital lobe infarction which is new from prior study  · Monitor neuro checks  · Hold HCTZ and lisinopril given HANNA  · Obtain TSH in AM            Leukocytosis   Assessment & Plan     · WBC 13 43   · Possibly reactive  · Patient afebrile  · CT abdomen/ pelvis: moderate gastric distention  · Urine unremarkable  · Obtain repeat CBC in AM           HANNA (acute kidney injury) ([facility] [address] )   Assessment & Plan     · Mild HANNA, POA, with Cr 1 79  · Baseline Cr 1 45-1  55   · Hold nephrotoxic medications including HCTZ and lisinopril  Patient reports occasionally taking Aleve for arthritis pain; change to acetaminophen prn for mild pain  · Monitor intake/output  · Repeat BMP in AM            HLD (hyperlipidemia)   Assessment & Plan     · Continue statin            Essential hypertension   Assessment & Plan     · BP acceptable  · Hold lisinopril and hydrochlorothiazide given HANNA    · Continue amlodipine            Admission Orders:  Scheduled Meds:   Current Facility-Administered Medications:  acetaminophen 650 mg Oral Q6H PRN 6/26 x 1    amLODIPine 10 mg Oral Daily    aspirin 81 mg Oral Daily    fish oil 1,000 mg Oral Daily    heparin (porcine) 5,000 Units Subcutaneous Q8H [address]    multivitamin-minerals 1 tablet Oral Daily    ondansetron 4 mg Intravenous Q6H PRN    pantoprazole 20 mg Oral Early Morning    pravastatin 40 mg Oral Daily      Nursing  orders - Telem - VS q 4 - Neuro checks q 4 - SCD's to le's - Orth BP q shift - Incentive spirometry - Up with assistance - diet cardiac 2 3 gm NA - PT eval

## 2018-06-26 NOTE — ASSESSMENT & PLAN NOTE
· Mild HANNA, POA, with Cr 1 79  · Baseline Cr 1 45-1  55   · Hold nephrotoxic medications including HCTZ and lisinopril  Patient reports occasionally taking Aleve for arthritis pain; change to acetaminophen prn for mild pain  · Monitor intake/output     · Repeat BMP in AM

## 2018-06-26 NOTE — H&P
H&P- Deborah Min 2/16/1927, 80 y o  male MRN: [id number]    Unit/Bed#: -01 Encounter: [id number]    Primary Care Provider: Shaila Lacy MD   Date and time admitted to hospital: 6/25/2018  7:26 PM        * Near syncope   Assessment & Plan    · Presented with lightheadedness, episode of near-syncope vs syncope  · Orthostatics obtained in ED were negative  Continue to monitor orthostatics  · Monitor on telemetry for arrhythmias  · CT head: no acute intracranial abnormality  Microangiopathic changes  Chronic appearing right occipital lobe infarction which is new from prior study  · Monitor neuro checks  · Hold HCTZ and lisinopril given HANNA  · Obtain TSH in AM          Leukocytosis   Assessment & Plan    · WBC 13 43   · Possibly reactive  · Patient afebrile  · CT abdomen/ pelvis: moderate gastric distention  · Urine unremarkable  · Obtain repeat CBC in AM         HANNA (acute kidney injury) ([facility] [address] )   Assessment & Plan    · Mild HANNA, POA, with Cr 1 79  · Baseline Cr 1 45-1  55   · Hold nephrotoxic medications including HCTZ and lisinopril  Patient reports occasionally taking Aleve for arthritis pain; change to acetaminophen prn for mild pain  · Monitor intake/output  · Repeat BMP in AM          HLD (hyperlipidemia)   Assessment & Plan    · Continue statin  Essential hypertension   Assessment & Plan    · BP acceptable  · Hold lisinopril and hydrochlorothiazide given HANNA  · Continue amlodipine          VTE Prophylaxis: Heparin  / sequential compression device   Code Status: Level 1- Full Code  POLST: POLST form is not discussed and not completed at this time  Anticipated Length of Stay:  Patient will be admitted on an Observation basis with an anticipated length of stay of  < 2 midnights  Justification for Hospital Stay: near-syncope    Total Time for Visit, including Counseling / Coordination of Care: 45 minutes    Greater than 50% of this total time spent on direct patient counseling and coordination of care  Chief Complaint:   Near syncope    History of Present Illness:    Ofe Candelaria is a 80 y o  male with a history of hypertension and hyperlipidemia who presents with near syncope  Patient resides at [facility] in independent living and was sitting at dinner last evening when he began feeling unwell  He notes that people at his table were also commenting that he didn't look well and he states that he just wanted to get back to his room to rest  He reports feeling lightheaded and then was noted to be "out of it " He is unsure whether or not he lost full consciousness but notes that if he did it was only for a few seconds  He reports that staff at the facility checked his blood pressure and noted that it was high  He denies any chest pain, SOB prior to the event and also denies recent illness, fevers/ chills, cough, urinary complaints or n/v/d  He states that he was otherwise feeling well during the day yesterday  He does note that he was recently seen by his PCP and was started on lisinopril 5 mg daily for hypertension in addition to his HCTZ and amlodipine  He reportedly took the lisinopril yesterday afternoon  Review of Systems:    Review of Systems   Constitutional: Negative for appetite change, chills and fever  Respiratory: Negative for cough and shortness of breath  Cardiovascular: Negative for chest pain  Gastrointestinal: Negative for diarrhea, nausea and vomiting  Genitourinary: Negative for difficulty urinating  Neurological: Positive for syncope (near) and light-headedness  Negative for headaches  All other systems reviewed and are negative  Past Medical and Surgical History:     Past Medical History:   Diagnosis Date    Hyperlipidemia     Hypertension     Stroke [facility])        History reviewed  No pertinent surgical history  Meds/Allergies:    Prior to Admission medications    Medication Sig Start Date End Date Taking?  Authorizing Provider   amLODIPine (NORVASC) 10 mg tablet Take 1 tablet by mouth daily 6/24/17  Yes Vincent Benson,    aspirin (ECOTRIN LOW STRENGTH) 81 mg EC tablet Take 81 mg by mouth daily   Yes Historical Provider, MD   Cholecalciferol (VITAMIN D3) 1000 units CAPS Take 5,000 Units by mouth   Yes Historical Provider, MD   hydrochlorothiazide (HYDRODIURIL) 25 mg tablet Take 25 mg by mouth daily   Yes Historical Provider, MD   lisinopril (ZESTRIL) 5 mg tablet Take 5 mg by mouth daily   Yes Historical Provider, MD   multivitamin (THERAGRAN) TABS Take 1 tablet by mouth daily With B complex   Yes Historical Provider, MD   Naproxen Sodium (ALEVE PO) Take 250 mg by mouth 2 (two) times a day     Yes Historical Provider, MD   Omega-3 Fatty Acids (FISH OIL) 1,000 mg Take 1 capsule by mouth daily 6/24/17  Yes Vincent Benson DO   omeprazole (PriLOSEC OTC) 20 MG tablet Take 20 mg by mouth daily   Yes Historical Provider, MD   pravastatin (PRAVACHOL) 40 mg tablet Take 40 mg by mouth daily   Yes Historical Provider, MD   albuterol (2 5 mg/3 mL) 0 083 % nebulizer solution Take 3 mL by nebulization every 6 (six) hours as needed for shortness of breath 6/24/17   Vincent Benson DO   calcium-vitamin D 250-100 MG-UNIT per tablet Take 1 tablet by mouth 2 (two) times a day    Historical Provider, MD   dextromethorphan-guaiFENesin (ROBITUSSIN DM)  mg/5 mL syrup Take 10 mL by mouth every 6 (six) hours 6/24/17   Vincent Benson DO   jyurm-3-ozix ethyl esters (LOVAZA) 1 g capsule Take 2 g by mouth 2 (two) times a day    Historical Provider, MD   pantoprazole (PROTONIX) 20 mg tablet Take 1 tablet by mouth daily in the early morning 6/24/17   Vincent Goyal, DO     I have reveiwed home medications using records provided by [facility]  Allergies: No Known Allergies    Social History:     Marital Status:     Patient Pre-hospital Living Situation: [facility], independent living  Patient Pre-hospital Level of Mobility: ambulates without assistance  Patient Pre-hospital Diet Restrictions: None  Substance Use History:   History   Alcohol Use No     History   Smoking Status    Never Smoker   Smokeless Tobacco    Never Used     History   Drug Use No       Family History:    History reviewed  No pertinent family history  Physical Exam:     Vitals:   Blood Pressure: 130/60 (06/26/18 0103)  Pulse: 86 (06/26/18 0103)  Temperature: 98 1 °F (36 7 °C) (06/26/18 0103)  Temp Source: Oral (06/26/18 0103)  Respirations: 20 (06/26/18 0103)  Height: 5' 7" (170 2 cm) (06/26/18 0103)  Weight - Scale: 66 6 kg (146 lb 13 2 oz) (06/26/18 0103)  SpO2: 94 % (06/26/18 0103)    Physical Exam   Constitutional: He is oriented to person, place, and time  He appears well-developed and well-nourished  No distress  Appears younger than stated age    HENT:   Head: Normocephalic and atraumatic  Eyes: Conjunctivae and EOM are normal    Cardiovascular: Normal rate, regular rhythm and normal heart sounds  Pulmonary/Chest: Effort normal and breath sounds normal  No respiratory distress  Abdominal: Soft  Bowel sounds are normal  There is no tenderness  Musculoskeletal: Normal range of motion  He exhibits no edema  Neurological: He is alert and oriented to person, place, and time  No cranial nerve deficit  Skin: Skin is warm and dry  He is not diaphoretic  No erythema  Psychiatric: He has a normal mood and affect  Nursing note and vitals reviewed  Additional Data:     Lab Results: I have personally reviewed pertinent reports          Results from last 7 days  Lab Units 06/25/18 2116   WBC Thousand/uL 13 43*   HEMOGLOBIN g/dL 14 3   HEMATOCRIT % 42 1   PLATELETS Thousands/uL 255   NEUTROS PCT % 76*   LYMPHS PCT % 14   MONOS PCT % 7   EOS PCT % 2       Results from last 7 days  Lab Units 06/25/18 2116   SODIUM mmol/L 136   POTASSIUM mmol/L 3 7   CHLORIDE mmol/L 100   CO2 mmol/L 27   BUN mg/dL 37*   CREATININE mg/dL 1 79*   CALCIUM mg/dL 8 8   TOTAL PROTEIN g/dL 7 4   BILIRUBIN TOTAL mg/dL 0 50   ALK PHOS U/L 72   ALT U/L 30   AST U/L 28   GLUCOSE RANDOM mg/dL 124                   Imaging: I have personally reviewed pertinent reports  CT abdomen pelvis wo contrast   Final Result by Maria E Puga MD (06/25 2222)         1  Moderate gastric distention may be physiologic, especially if the patient has a recently  An element of gastroparesis however is not excluded  Further clinical correlation recommended  Workstation performed: [id number]         CT head without contrast   Final Result by Maria E Puga MD (06/25 2215)      No acute intracranial abnormality  Microangiopathic changes  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

## 2018-06-26 NOTE — ASSESSMENT & PLAN NOTE
· WBC 13 43   · Possibly reactive  · Patient afebrile  · CT abdomen/ pelvis: moderate gastric distention  · Urine unremarkable     · Obtain repeat CBC in AM

## 2018-06-26 NOTE — PHYSICAL THERAPY NOTE
PHYSICAL THERAPY EVALUATION  NAME: Korin Chaudhary  AGE:   80 y o  MRN:  [id number]  ADMIT DX: Syncope [R55]  HANNA (acute kidney injury) ([facility] [address] ) [N17 9]  Near syncope [R55]    PMH:   Past Medical History:   Diagnosis Date    Hyperlipidemia     Hypertension     Stroke ([facility] )      LENGTH OF STAY: 0       18 1455   Pain Assessment   Pain Assessment No/denies pain   Pain Score No Pain   Home Living   Type of Home Apartment  ([address] independent living)   Home Layout One level   Additional Comments Pt ambulates independently without AD at baseline  Prior Function   Level of [place] Independent with ADLs and functional mobility   Lives With Alone   ADL Assistance Independent   IADLs Independent   Falls in the last 6 months 0   Restrictions/Precautions   Weight Bearing Precautions Per Order No   Other Precautions Impulsive; Fall Risk;Hard of hearing;Telemetry   General   Family/Caregiver Present No   Cognition   Overall Cognitive Status WFL   Arousal/Participation Cooperative   Orientation Level Oriented X4   Memory Within functional limits   Following Commands Follows one step commands with increased time or repetition   Comments Pt identified by name and   RLE Assessment   RLE Assessment X   Strength RLE   RLE Overall Strength 4+/5  (functionally)   LLE Assessment   LLE Assessment X   Strength LLE   LLE Overall Strength 4+/5  (functionally)   Bed Mobility   Supine to Sit 6  Modified independent   Additional items HOB elevated; Bedrails   Sit to Supine Unable to assess  (left OOB in bathroom post session with pull cord/RN aware)   Transfers   Sit to Stand 5  Supervision   Additional items Increased time required;Verbal cues; Impulsive   Stand to Sit 5  Supervision   Additional items Increased time required;Verbal cues; Impulsive   Stand pivot 5  Supervision   Additional items Verbal cues; Increased time required   Ambulation/Elevation   Gait pattern Decreased foot clearance; Forward Flexion Gait Assistance 5  Supervision   Additional items Verbal cues   Assistive Device None;Rolling walker  (trials with and without AD)   Distance 120` x1 with RW and 90` x1 without AD   Balance   Static Sitting Good   Dynamic Sitting Fair +   Static Standing Fair +   Dynamic Standing Fair   Ambulatory Fair   Endurance Deficit   Endurance Deficit Yes   Endurance Deficit Description limited ambulation distance   Activity Tolerance   Activity Tolerance Patient limited by fatigue   Nurse Made Aware Per RN, pt appropriate to evaluate   Recommendation   Recommendation Home with family support   Equipment Recommended (pt reports he plans to order a rollator)   Barthel Index   Feeding 10   Bathing 5   Grooming Score 5   Dressing Score 10   Bladder Score 10   Bowels Score 10   Toilet Use Score 5   Transfers (Bed/Chair) Score 10   Mobility (Level Surface) Score 10   Stairs Score 0   Barthel Index Score 75       Assessment: Pt is a 80 y o  male seen for PT evaluation s/p admit to [facility] on 6/25/2018 w/ Near syncope  Order placed for PT  Comorbidities affecting pt's physical performance at time of assessment listed above  Personal factors affecting pt at time of IE include: advanced age  Prior to admission, pt was was independent w/ all functional mobility w/ out AD, lived in one floor environment and lives alone in an independent living apartment  Upon evaluation: Pt requires SBA for bed mobility, SBA for sit to stand, and SBA for ambulation with/without AD  No overt LOB noted  Pt educated on decreasing speed of mobility to increase safety and decrease risk of "lightheadedness/dizziness"  (Please find full objective findings from PT assessment regarding body systems outlined above)  Impairments and limitations also listed above, especially due to  impaired balance, gait deviations, decreased safety awareness and fall risk   The following objective measures performed on IE also reveal limitations: Barthel Index 75/100  Pt's clinical presentation is currently evolving seen in pt's presentation of decreased safety awareness, impulsivity, and fall risk due to lightheadedness/dizziness  No acute skilled PT needs at this time as pt is close to his baseline for functional mobility and reports no needs  From PT/mobility standpoint, recommendation at time of d/c would be home with family support pending progress in order to maximize pt's functional independence and consistency w/ mobility in order to facilitate return to PLOF  Will discharge pt from PT caseload        Tete Ceballos, PT,DPT

## 2018-06-26 NOTE — ASSESSMENT & PLAN NOTE
· Presented with lightheadedness, episode of near-syncope vs syncope  · Orthostatics obtained in ED were negative  Continue to monitor orthostatics  · Monitor on telemetry for arrhythmias  · CT head: no acute intracranial abnormality  Microangiopathic changes  Chronic appearing right occipital lobe infarction which is new from prior study  · Monitor neuro checks  · Hold HCTZ and lisinopril given HANNA     · Obtain TSH in AM

## 2018-06-26 NOTE — PLAN OF CARE
CARDIOVASCULAR - ADULT     Maintains optimal cardiac output and hemodynamic stability Progressing     Absence of cardiac dysrhythmias or at baseline rhythm Progressing        DISCHARGE PLANNING     Discharge to home or other facility with appropriate resources Progressing        INFECTION - ADULT     Absence or prevention of progression during hospitalization Progressing     Absence of fever/infection during neutropenic period Progressing        Knowledge Deficit     Patient/family/caregiver demonstrates understanding of disease process, treatment plan, medications, and discharge instructions Progressing        PAIN - ADULT     Verbalizes/displays adequate comfort level or baseline comfort level Progressing        Potential for Falls     Patient will remain free of falls Progressing        SAFETY ADULT     Patient will remain free of falls Progressing     Maintain or return to baseline ADL function Progressing     Maintain or return mobility status to optimal level Progressing

## 2018-06-26 NOTE — ED PROVIDER NOTES
History  Chief Complaint   Patient presents with    Syncope     Change in BP medication today for Hypertension, was at living facility when he had a syncopable episiode  No complaints of pain or discomfort, A xO x4     80 y o  Male presents for evaluation s/p observed "syncopal episode" while at dinner at his assisted living facility  Notes he did have a new blood pressure medication added today while at PCP  Denies LOC, fall, hitting head, N/V/D, CP, fevers, chills, Diaphoresis, HA, blurry vision, dizziness; States while at dinner he didn't feel well asked to go back to his room, but nurse was called and he was advised to come to ER for evaluation notes " blood pressure was out of whack and they told me I was turning blue "             Prior to Admission Medications   Prescriptions Last Dose Informant Patient Reported? Taking?    Cholecalciferol (VITAMIN D3) 1000 units CAPS 6/25/2018 at Unknown time  Yes Yes   Sig: Take 5,000 Units by mouth   Naproxen Sodium (ALEVE PO) 6/25/2018 at Unknown time  Yes Yes   Sig: Take 250 mg by mouth 2 (two) times a day     Omega-3 Fatty Acids (FISH OIL) 1,000 mg 6/25/2018 at Unknown time  No Yes   Sig: Take 1 capsule by mouth daily   albuterol (2 5 mg/3 mL) 0 083 % nebulizer solution   No No   Sig: Take 3 mL by nebulization every 6 (six) hours as needed for shortness of breath   amLODIPine (NORVASC) 10 mg tablet 6/25/2018 at Unknown time  No Yes   Sig: Take 1 tablet by mouth daily   aspirin (ECOTRIN LOW STRENGTH) 81 mg EC tablet 6/25/2018 at Unknown time  Yes Yes   Sig: Take 81 mg by mouth daily   calcium-vitamin D 250-100 MG-UNIT per tablet   Yes No   Sig: Take 1 tablet by mouth 2 (two) times a day   dextromethorphan-guaiFENesin (ROBITUSSIN DM)  mg/5 mL syrup   No No   Sig: Take 10 mL by mouth every 6 (six) hours   hydrochlorothiazide (HYDRODIURIL) 25 mg tablet 6/25/2018 at Unknown time  Yes Yes   Sig: Take 25 mg by mouth daily   lisinopril (ZESTRIL) 5 mg tablet 6/25/2018 at Unknown time  Yes Yes   Sig: Take 5 mg by mouth daily   multivitamin (THERAGRAN) TABS 6/25/2018 at Unknown time  Yes Yes   Sig: Take 1 tablet by mouth daily With B complex   omega-3-acid ethyl esters (LOVAZA) 1 g capsule   Yes No   Sig: Take 2 g by mouth 2 (two) times a day   pantoprazole (PROTONIX) 20 mg tablet   No No   Sig: Take 1 tablet by mouth daily in the early morning   pravastatin (PRAVACHOL) 40 mg tablet 6/25/2018 at Unknown time  Yes Yes   Sig: Take 40 mg by mouth daily      Facility-Administered Medications: None       Past Medical History:   Diagnosis Date    Hyperlipidemia     Hypertension     Stroke [facility])        History reviewed  No pertinent surgical history  History reviewed  No pertinent family history  I have reviewed and agree with the history as documented  Social History   Substance Use Topics    Smoking status: Never Smoker    Smokeless tobacco: Never Used    Alcohol use No        Review of Systems   Neurological: Positive for dizziness  All other systems reviewed and are negative  Physical Exam  Physical Exam   Constitutional: He is oriented to person, place, and time  He appears well-developed and well-nourished  HENT:   Head: Normocephalic and atraumatic  Right Ear: External ear normal    Left Ear: External ear normal    Nose: Nose normal    Mouth/Throat: Oropharynx is clear and moist    Eyes: Conjunctivae are normal  Pupils are equal, round, and reactive to light  Neck: Normal range of motion  Cardiovascular: Normal rate, regular rhythm, normal heart sounds and intact distal pulses  Pulmonary/Chest: Effort normal and breath sounds normal    Abdominal: Soft  Bowel sounds are normal    Musculoskeletal: Normal range of motion  Neurological: He is alert and oriented to person, place, and time  He displays normal reflexes  No cranial nerve deficit or sensory deficit  He exhibits normal muscle tone  Coordination normal    Skin: Skin is warm   Capillary refill takes less than 2 seconds  Psychiatric: He has a normal mood and affect  His behavior is normal    Nursing note and vitals reviewed        Vital Signs  ED Triage Vitals   Temperature Pulse Respirations Blood Pressure SpO2   06/25/18 2311 06/25/18 1935 06/25/18 1935 06/25/18 1935 06/25/18 1935   97 9 °F (36 6 °C) 87 18 152/75 93 %      Temp Source Heart Rate Source Patient Position - Orthostatic VS BP Location FiO2 (%)   06/25/18 2311 06/25/18 1935 06/25/18 1935 06/25/18 1935 --   Oral Monitor Lying Right arm       Pain Score       06/26/18 0024       No Pain           Vitals:    06/27/18 0833 06/27/18 1142 06/27/18 1341 06/27/18 1345   BP: 162/79 166/84 164/80 160/80   Pulse: 100 95     Patient Position - Orthostatic VS:  Lying         Visual Acuity  Visual Acuity      Most Recent Value   L Pupil Size (mm)  3   R Pupil Size (mm)  3   L Pupil Shape  Round   R Pupil Shape  Round          ED Medications  Medications   aspirin chewable tablet 81 mg (81 mg Oral Given 6/25/18 2111)   potassium chloride (K-DUR,KLOR-CON) CR tablet 20 mEq (20 mEq Oral Given 6/26/18 1243)   sodium chloride 0 9 % bolus 500 mL (500 mL Intravenous New Bag 6/26/18 1634)   lisinopril (ZESTRIL) tablet 2 5 mg (2 5 mg Oral Given 6/27/18 1234)       Diagnostic Studies  Results Reviewed     Procedure Component Value Units Date/Time    Urine Microscopic [[id number]]  (Abnormal) Collected:  06/25/18 2334    Lab Status:  Final result Specimen:  Urine from Urine, Clean Catch Updated:  06/26/18 0012     RBC, UA None Seen /hpf      WBC, UA 0-1 (A) /hpf      Epithelial Cells Occasional /hpf      Bacteria, UA None Seen /hpf     ED Urine Macroscopic [[id number]]  (Abnormal) Collected:  06/25/18 2334    Lab Status:  Final result Specimen:  Urine Updated:  06/25/18 2328     Color, UA Yellow     Clarity, UA Clear     pH, UA 6 0     Leukocytes, UA Negative     Nitrite, UA Negative     Protein,  (2+) (A) mg/dl      Glucose, UA Negative mg/dl      Ketones, UA Negative mg/dl      Urobilinogen, UA 1 0 E U /dl      Bilirubin, UA Negative     Blood, UA Negative     Specific Gravity, UA 1 025    Narrative:       CLINITEK RESULT    Troponin I [[id number]]  (Normal) Collected:  06/25/18 2116    Lab Status:  Final result Specimen:  Blood from Arm, Right Updated:  06/25/18 2154     Troponin I <0 02 ng/mL     Comprehensive metabolic panel [[id number]]  (Abnormal) Collected:  06/25/18 2116    Lab Status:  Final result Specimen:  Blood from Arm, Right Updated:  06/25/18 2152     Sodium 136 mmol/L      Potassium 3 7 mmol/L      Chloride 100 mmol/L      CO2 27 mmol/L      Anion Gap 9 mmol/L      BUN 37 (H) mg/dL      Creatinine 1 79 (H) mg/dL      Glucose 124 mg/dL      Calcium 8 8 mg/dL      AST 28 U/L      ALT 30 U/L      Alkaline Phosphatase 72 U/L      Total Protein 7 4 g/dL      Albumin 3 4 (L) g/dL      Total Bilirubin 0 50 mg/dL      eGFR 32 ml/min/1 73sq m     Narrative:         National Kidney Disease Education Program recommendations are as follows:  GFR calculation is accurate only with a steady state creatinine  Chronic Kidney disease less than 60 ml/min/1 73 sq  meters  Kidney failure less than 15 ml/min/1 73 sq  meters      CBC and differential [[id number]]  (Abnormal) Collected:  06/25/18 2116    Lab Status:  Final result Specimen:  Blood from Arm, Right Updated:  06/25/18 2134     WBC 13 43 (H) Thousand/uL      RBC 4 66 Million/uL      Hemoglobin 14 3 g/dL      Hematocrit 42 1 %      MCV 90 fL      MCH 30 7 pg      MCHC 34 0 g/dL      RDW 13 0 %      MPV 10 3 fL      Platelets 511 Thousands/uL      Neutrophils Relative 76 (H) %      Lymphocytes Relative 14 %      Monocytes Relative 7 %      Eosinophils Relative 2 %      Basophils Relative 0 %      Neutrophils Absolute 10 25 (H) Thousands/µL      Lymphocytes Absolute 1 88 Thousands/µL      Monocytes Absolute 0 94 Thousand/µL      Eosinophils Absolute 0 32 Thousand/µL      Basophils Absolute 0 04 Thousands/µL XR chest 2 views   Final Result by Jessika Damon MD (06/26 9844)      No acute cardiopulmonary disease  Workstation performed: [id number]         CT abdomen pelvis wo contrast   Final Result by Hudson Torre MD (06/25 2222)         1  Moderate gastric distention may be physiologic, especially if the patient has a recently  An element of gastroparesis however is not excluded  Further clinical correlation recommended  Workstation performed: [id number]         CT head without contrast   Final Result by Hudson Torre MD (06/25 2213)      No acute intracranial abnormality  Microangiopathic changes  Chronic appearing right occipital lobe infarction is new from the prior study  Workstation performed: [id number]                    Procedures  Procedures       Phone Contacts  ED Phone Contact    ED Course                               MDM  Number of Diagnoses or Management Options  HANNA (acute kidney injury) [facility]): new and requires workup  Near syncope: new and requires workup  Diagnosis management comments: 80 y o  Male presents for evaluation s/p observed "syncopal episode" while at dinner at his assisted living facility  Notes he did have a new blood pressure medication added today while at PCP  Denies LOC, fall, hitting head, N/V/D, CP, fevers, chills, Diaphoresis, HA, blurry vision, dizziness; States while at dinner he didn't feel well asked to go back to his room, but nurse was called and he was advised to come to ER for evaluation notes " blood pressure was out of whack and they told me I was turning blue "   Pt   Is A&Ox4, without complaints, VSS, afebrile, NAD, PERRLA, EOMI, neck supple, RRR, cap refill brisk, no edema, 2+ pulses, LS CTA B/L, Abd soft NT/ND; CNI,   Will draw labs, ECG, CXR, CTH, CT abdomen pelvis, reassess  CTH WNL, CT A&P WNL, CXr WNL  + HANNA, will give IVF  Will discuss with SLIM for Admission       Amount and/or Complexity of Data Reviewed  Clinical lab tests: ordered and reviewed  Tests in the radiology section of CPT®: ordered and reviewed      CritCare Time    Disposition  Final diagnoses:   HANNA (acute kidney injury) ([facility] [address] )   Near syncope     Time reflects when diagnosis was documented in both MDM as applicable and the Disposition within this note     Time User Action Codes Description Comment    6/25/2018 11:46 PM Yadira Hugo [N17 9] HANNA (acute kidney injury) ([facility] [address] )     6/25/2018 11:47 PM Wally Cranker Add [R55] Near syncope     6/27/2018  1:47 PM Kavin Snyder, Obdulio Galindo M Add [I10] Essential hypertension       ED Disposition     ED Disposition Condition Comment    Admit  Case was discussed with Harish Pettit the patient's admission status was agreed to be Observation tele to the service of Dr Ryan Reyez  Follow-up Information     Follow up With Specialties Details Why Josy Doan MD Internal Medicine Go on 7/10/2018  [address] [place] [address]  Colonel Captain, MD Cardiology, Multidisciplinary Schedule an appointment as soon as possible for a visit Please follow up with cardiology for blood pressure monitoring/management  You may call the number above and ask for soonest available appointment with any provider within the group    [address] [address]  [phone]            Discharge Medication List as of 6/27/2018  2:40 PM      CONTINUE these medications which have CHANGED    Details   lisinopril (ZESTRIL) 5 mg tablet Take 1 5 tablets (7 5 mg total) by mouth daily, Starting Wed 6/27/2018, Print         CONTINUE these medications which have NOT CHANGED    Details   albuterol (2 5 mg/3 mL) 0 083 % nebulizer solution Take 3 mL by nebulization every 6 (six) hours as needed for shortness of breath, Starting Sat 6/24/2017, Normal      amLODIPine (NORVASC) 10 mg tablet Take 1 tablet by mouth daily, Starting Sat 6/24/2017, Normal      aspirin (ECOTRIN LOW STRENGTH) 81 mg EC tablet Take 81 mg by mouth daily, Historical Med      calcium-vitamin D 250-100 MG-UNIT per tablet Take 1 tablet by mouth 2 (two) times a day, Historical Med      Cholecalciferol (VITAMIN D3) 1000 units CAPS Take 5,000 Units by mouth, Historical Med      dextromethorphan-guaiFENesin (ROBITUSSIN DM)  mg/5 mL syrup Take 10 mL by mouth every 6 (six) hours, Starting Sat 6/24/2017, Normal      multivitamin (THERAGRAN) TABS Take 1 tablet by mouth daily With B complex, Historical Med      Naproxen Sodium (ALEVE PO) Take 250 mg by mouth 2 (two) times a day  , Historical Med      Omega-3 Fatty Acids (FISH OIL) 1,000 mg Take 1 capsule by mouth daily, Starting Sat 6/24/2017, Normal      omega-3-acid ethyl esters (LOVAZA) 1 g capsule Take 2 g by mouth 2 (two) times a day, Historical Med      pantoprazole (PROTONIX) 20 mg tablet Take 1 tablet by mouth daily in the early morning, Starting Sat 6/24/2017, Normal      pravastatin (PRAVACHOL) 40 mg tablet Take 40 mg by mouth daily, Historical Med         STOP taking these medications       hydrochlorothiazide (HYDRODIURIL) 25 mg tablet Comments:   Reason for Stopping:               Outpatient Discharge Orders  Discharge Diet     Activity as tolerated     Call provider for:  persistent dizziness or light-headedness     Call provider for:  difficulty breathing, headache or visual disturbances         ED Provider  Electronically Signed by           Murphy Castro PA-C  07/09/18 2808

## 2018-06-27 VITALS
RESPIRATION RATE: 16 BRPM | WEIGHT: 146.83 LBS | OXYGEN SATURATION: 94 % | BODY MASS INDEX: 23.04 KG/M2 | HEIGHT: 67 IN | DIASTOLIC BLOOD PRESSURE: 80 MMHG | HEART RATE: 95 BPM | TEMPERATURE: 97.4 F | SYSTOLIC BLOOD PRESSURE: 160 MMHG

## 2018-06-27 PROBLEM — N17.9 AKI (ACUTE KIDNEY INJURY) (HCC): Status: RESOLVED | Noted: 2017-06-20 | Resolved: 2018-06-27

## 2018-06-27 LAB
ANION GAP SERPL CALCULATED.3IONS-SCNC: 10 MMOL/L (ref 4–13)
BUN SERPL-MCNC: 29 MG/DL (ref 5–25)
CALCIUM SERPL-MCNC: 9 MG/DL (ref 8.3–10.1)
CHLORIDE SERPL-SCNC: 103 MMOL/L (ref 100–108)
CO2 SERPL-SCNC: 25 MMOL/L (ref 21–32)
CREAT SERPL-MCNC: 1.49 MG/DL (ref 0.6–1.3)
GFR SERPL CREATININE-BSD FRML MDRD: 40 ML/MIN/1.73SQ M
GLUCOSE P FAST SERPL-MCNC: 97 MG/DL (ref 65–99)
GLUCOSE SERPL-MCNC: 97 MG/DL (ref 65–140)
MRSA NOSE QL CULT: NORMAL
POTASSIUM SERPL-SCNC: 3.7 MMOL/L (ref 3.5–5.3)
SODIUM SERPL-SCNC: 138 MMOL/L (ref 136–145)

## 2018-06-27 PROCEDURE — 99217 PR OBSERVATION CARE DISCHARGE MANAGEMENT: CPT | Performed by: NURSE PRACTITIONER

## 2018-06-27 PROCEDURE — 80048 BASIC METABOLIC PNL TOTAL CA: CPT | Performed by: INTERNAL MEDICINE

## 2018-06-27 RX ORDER — LISINOPRIL 5 MG/1
5 TABLET ORAL DAILY
Status: DISCONTINUED | OUTPATIENT
Start: 2018-06-27 | End: 2018-06-27 | Stop reason: HOSPADM

## 2018-06-27 RX ORDER — LISINOPRIL 2.5 MG/1
2.5 TABLET ORAL ONCE
Status: COMPLETED | OUTPATIENT
Start: 2018-06-27 | End: 2018-06-27

## 2018-06-27 RX ORDER — LISINOPRIL 5 MG/1
7.5 TABLET ORAL DAILY
Qty: 30 TABLET | Refills: 0 | Status: SHIPPED | OUTPATIENT
Start: 2018-06-27 | End: 2018-08-21 | Stop reason: SDUPTHER

## 2018-06-27 RX ADMIN — HEPARIN SODIUM 5000 UNITS: 5000 INJECTION, SOLUTION INTRAVENOUS; SUBCUTANEOUS at 05:51

## 2018-06-27 RX ADMIN — PRAVASTATIN SODIUM 40 MG: 40 TABLET ORAL at 09:15

## 2018-06-27 RX ADMIN — AMLODIPINE BESYLATE 10 MG: 10 TABLET ORAL at 09:14

## 2018-06-27 RX ADMIN — Medication 1 TABLET: at 09:14

## 2018-06-27 RX ADMIN — LISINOPRIL 2.5 MG: 2.5 TABLET ORAL at 12:34

## 2018-06-27 RX ADMIN — ASPIRIN 81 MG: 81 TABLET, COATED ORAL at 09:14

## 2018-06-27 RX ADMIN — LISINOPRIL 5 MG: 5 TABLET ORAL at 09:15

## 2018-06-27 RX ADMIN — Medication 1000 MG: at 09:14

## 2018-06-27 RX ADMIN — PANTOPRAZOLE SODIUM 20 MG: 20 TABLET, DELAYED RELEASE ORAL at 05:51

## 2018-06-27 NOTE — DISCHARGE SUMMARY
Discharge Summary - [facility] Internal Medicine    Patient Information: Arlington Mortimer 80 y o  male MRN: [id number]  Unit/Bed#: -01 Encounter: [id number]    Discharging Physician / Practitioner: Lolis Brunner  PCP: Maureen Calle MD  Admission Date: 6/25/2018  Discharge Date: 06/27/18    Reason for Admission:  Near syncope    Diagnosis at discharge; a KI, dehydration, orthostatic hypotension    Discharge Diagnoses:     Principal Problem:    Near syncope  Active Problems:    Essential hypertension    HLD (hyperlipidemia)    Leukocytosis  Resolved Problems:    HANNA (acute kidney injury) ([address] )      Consultations During Hospital Stay:  · Physical therapy    Procedures Performed:     · None    Significant Findings / Test Results:     · Chest x-ray negative  · CT of the head no acute intracranial abnormality  Microangiopathic changes  Chronic appearing right occipital lobe infarction is new from the prior study  · CT abdomen and pelvis:  Moderate gastric distention may be physiologic  An element of gastroparesis however is not excluded  · EKG:  Normal sinus rhythm  Nonspecific intraventricular conduction delay  · Troponins x4 negative  · Creatinine on admission 1 79, improved to 1 49 on day of discharge  · Urinalysis unremarkable  · TSH normal  · MRSA culture, nares negative  Incidental Findings:   · none    Test Results Pending at Discharge (will require follow up):   · none     Outpatient Tests Requested:  · Follow-up with primary care provider as previously scheduled on 07/02  · See Cardiology as outpatient for BP management     Complications:  None     Hospital Course:     Arlington Mortimer is a 80 y o  male patient with past medical history of hypertension, CKD and hyperlipidemia who originally presented to the hospital on 6/25/2018 due to a near syncopal event  Patient was found to have a mild a KI as well as orthostatic    Patient was given intravenous fluids and HANNA and orthostatic gap improved  ACE-inhibitor and diuretic were held  Patient has had no further episodes of lightheadedness or dizziness  Patient is very anxious that he is going to lose his 's license as a result of this episode  He states that his blood pressure has been an ongoing issue and fluctuates  I did resume his ACE-inhibitor and actually increased the dose to 7 5 mg prior to discharge  His blood pressure has improved but is still on the higher side  This is likely due to the fact the patient wants to go home and is anxious regarding his 's license and that his BP is high  The patient admits that he typically only drinks water in the morning when he takes his pills and occasionally fluids with meals  At this point I feel we should stop hydrochlorothiazide  Discussed changing positions slowly with patient and adding Mauricio stockings in which he is agreeable to  Patient is stable for discharge  He is still orthostatic but no longer symptomatic  Patient is due to follow up with his primary care provider on 07/02  Medication changes at discharge:  Stop HCTZ  Increase Lisinopril to 7 5 mg daily    Condition at Discharge: stable     Discharge Day Visit / Exam:     Subjective:  Patient offers no complaints  Denies any chest pain or shortness of breath  Denies any dizziness, lightheadedness  Vitals: Blood Pressure: 160/80 (06/27/18 1345)  Pulse: 95 (06/27/18 1142)  Temperature: (!) 97 4 °F (36 3 °C) (06/27/18 0757)  Temp Source: Oral (06/27/18 0757)  Respirations: 16 (06/27/18 1142)  Height: 5' 7" (170 2 cm) (06/26/18 0103)  Weight - Scale: 66 6 kg (146 lb 13 2 oz) (06/26/18 0103)  SpO2: 94 % (06/27/18 1142)     Exam:   Physical Exam   Constitutional: He is oriented to person, place, and time  No distress  HENT:   Head: Normocephalic  Eyes: Pupils are equal, round, and reactive to light  Neck: Normal range of motion  No JVD present     Cardiovascular: Normal rate, regular rhythm and intact distal pulses  No murmur heard  Pulmonary/Chest: Breath sounds normal    Abdominal: Soft  Musculoskeletal: Normal range of motion  He exhibits no edema  Neurological: He is alert and oriented to person, place, and time  Skin: Skin is warm  Psychiatric: He has a normal mood and affect  Nursing note and vitals reviewed  Discussion with Family:  Patient and Amanda Sanchez, daughter over the phone x2    Discharge instructions/Information to patient and family:   See after visit summary for information provided to patient and family  Provisions for Follow-Up Care:  See after visit summary for information related to follow-up care and any pertinent home health orders  Disposition:     Home    For Discharges to [facility]:   · Not Applicable to this Patient - Not Applicable to this Patient    Planned Readmission: No     Discharge Statement:  I spent 45 minutes discharging the patient  This time was spent on the day of discharge  I had direct contact with the patient on the day of discharge  Greater than 50% of the total time was spent examining patient, answering all patient questions, arranging and discussing plan of care with patient as well as directly providing post-discharge instructions  Additional time then spent on discharge activities  Discharge Medications:  See after visit summary for reconciled discharge medications provided to patient and family        ** Please Note: This note has been constructed using a voice recognition system **

## 2018-06-27 NOTE — CASE MANAGEMENT
Thank you,  [facility] in the [facility] by Aníbal Taveras for 2017  Network Utilization Review Department  Phone: [phone]; Fax [phone]  ATTENTION: The Network Utilization Review Department is now centralized for our 7 Facilities  Make a note that we have a new phone and fax numbers for our Department  Please call with any questions or concerns to [phone] and carefully follow the prompts so that you are directed to the right person  All voicemails are confidential  Fax any determinations, approvals, denials, and requests for initial or continue stay review clinical to [phone]  Due to HIGH CALL volume, it would be easier if you could please send faxed requests to expedite your requests and in part, help us provide discharge notifications faster    Continued Stay Review    Date: 06/27/2018    Vital Signs: /79   Pulse 100   Temp (!) 97 4 °F (36 3 °C) (Oral)   Resp 18   Ht 5' 7" (1 702 m)   Wt 66 6 kg (146 lb 13 2 oz)   SpO2 97%   BMI 23 00 kg/m²     Medications:   Scheduled Meds:   Current Facility-Administered Medications:  acetaminophen 650 mg Oral Q6H PRN   amLODIPine 10 mg Oral Daily   aspirin 81 mg Oral Daily   fish oil 1,000 mg Oral Daily   heparin (porcine) 5,000 Units Subcutaneous Q8H [facility] HOME   lisinopril 5 mg Oral Daily   multivitamin-minerals 1 tablet Oral Daily   ondansetron 4 mg Intravenous Q6H PRN   pantoprazole 20 mg Oral Early Morning   pravastatin 40 mg Oral Daily     Continuous Infusions:    PRN Meds:   acetaminophen    ondansetron    Abnormal Labs/Diagnostic Results: 06/27/2018: BUN: 29, creat 1 49,   06/25/2018 EKG: Normal sinus rhythm  Non-specific intra-ventricular conduction delay  Borderline ECG  Age/Sex: 80 y o  male     Assessment/Plan: No note yet (06/27/2018 10:44)    Discharge Plan: DC order 06/27/2018 1405

## 2018-06-27 NOTE — PLAN OF CARE
CARDIOVASCULAR - ADULT     Maintains optimal cardiac output and hemodynamic stability Completed     Absence of cardiac dysrhythmias or at baseline rhythm Completed        DISCHARGE PLANNING     Discharge to home or other facility with appropriate resources Completed        INFECTION - ADULT     Absence or prevention of progression during hospitalization Completed     Absence of fever/infection during neutropenic period Completed        Knowledge Deficit     Patient/family/caregiver demonstrates understanding of disease process, treatment plan, medications, and discharge instructions Completed        PAIN - ADULT     Verbalizes/displays adequate comfort level or baseline comfort level Completed        Potential for Falls     Patient will remain free of falls Completed        SAFETY ADULT     Patient will remain free of falls Completed     Maintain or return to baseline ADL function Completed     Maintain or return mobility status to optimal level Completed

## 2018-06-27 NOTE — DISCHARGE INSTR - AVS FIRST PAGE
· Please do not drive until cleared by your primary care provider  · Change positions slowly  · Stay hydrated  · Wear [employer] stockings during the day  · Make and appointment to see cardiology as outpatient for monitoring of your blood pressure

## 2018-08-21 ENCOUNTER — OFFICE VISIT (OUTPATIENT)
Dept: CARDIOLOGY CLINIC | Facility: CLINIC | Age: 83
End: 2018-08-21
Payer: COMMERCIAL

## 2018-08-21 VITALS
DIASTOLIC BLOOD PRESSURE: 90 MMHG | WEIGHT: 146.8 LBS | SYSTOLIC BLOOD PRESSURE: 146 MMHG | HEIGHT: 67 IN | BODY MASS INDEX: 23.04 KG/M2 | HEART RATE: 92 BPM

## 2018-08-21 DIAGNOSIS — I10 ESSENTIAL HYPERTENSION: Chronic | ICD-10-CM

## 2018-08-21 DIAGNOSIS — R55 NEAR SYNCOPE: Primary | ICD-10-CM

## 2018-08-21 PROCEDURE — 99214 OFFICE O/P EST MOD 30 MIN: CPT | Performed by: INTERNAL MEDICINE

## 2018-08-21 RX ORDER — HYDROCHLOROTHIAZIDE 25 MG/1
25 TABLET ORAL DAILY
Qty: 30 TABLET | Refills: 1 | Status: SHIPPED | OUTPATIENT
Start: 2018-08-21 | End: 2019-10-25 | Stop reason: HOSPADM

## 2018-08-21 RX ORDER — OMEPRAZOLE 20 MG/1
CAPSULE, DELAYED RELEASE ORAL
COMMUNITY
Start: 2018-07-17 | End: 2020-04-22 | Stop reason: SDUPTHER

## 2018-08-21 RX ORDER — LISINOPRIL 5 MG/1
5 TABLET ORAL DAILY
Qty: 30 TABLET | Refills: 0 | COMMUNITY
Start: 2018-08-21 | End: 2020-07-21 | Stop reason: SDUPTHER

## 2019-04-20 ENCOUNTER — APPOINTMENT (EMERGENCY)
Dept: CT IMAGING | Facility: HOSPITAL | Age: 84
End: 2019-04-20
Payer: COMMERCIAL

## 2019-04-20 ENCOUNTER — HOSPITAL ENCOUNTER (EMERGENCY)
Facility: HOSPITAL | Age: 84
Discharge: HOME | End: 2019-04-20
Attending: EMERGENCY MEDICINE | Admitting: EMERGENCY MEDICINE
Payer: COMMERCIAL

## 2019-04-20 VITALS
RESPIRATION RATE: 18 BRPM | BODY MASS INDEX: 22.65 KG/M2 | TEMPERATURE: 98 F | WEIGHT: 144.62 LBS | DIASTOLIC BLOOD PRESSURE: 88 MMHG | SYSTOLIC BLOOD PRESSURE: 177 MMHG | OXYGEN SATURATION: 94 % | HEART RATE: 72 BPM

## 2019-04-20 DIAGNOSIS — N17.9 AKI (ACUTE KIDNEY INJURY) (HCC): Primary | ICD-10-CM

## 2019-04-20 LAB
ALBUMIN SERPL BCP-MCNC: 3.5 G/DL (ref 3.5–5)
ALP SERPL-CCNC: 80 U/L (ref 46–116)
ALT SERPL W P-5'-P-CCNC: 20 U/L (ref 12–78)
ANION GAP SERPL CALCULATED.3IONS-SCNC: 9 MMOL/L (ref 4–13)
AST SERPL W P-5'-P-CCNC: 22 U/L (ref 5–45)
BASOPHILS # BLD AUTO: 0.04 THOUSANDS/ΜL (ref 0–0.1)
BASOPHILS NFR BLD AUTO: 0 % (ref 0–1)
BILIRUB SERPL-MCNC: 0.6 MG/DL (ref 0.2–1)
BUN SERPL-MCNC: 38 MG/DL (ref 5–25)
CALCIUM SERPL-MCNC: 9.7 MG/DL (ref 8.3–10.1)
CHLORIDE SERPL-SCNC: 101 MMOL/L (ref 100–108)
CO2 SERPL-SCNC: 24 MMOL/L (ref 21–32)
CREAT SERPL-MCNC: 2.02 MG/DL (ref 0.6–1.3)
EOSINOPHIL # BLD AUTO: 0.15 THOUSAND/ΜL (ref 0–0.61)
EOSINOPHIL NFR BLD AUTO: 2 % (ref 0–6)
ERYTHROCYTE [DISTWIDTH] IN BLOOD BY AUTOMATED COUNT: 12.4 % (ref 11.6–15.1)
GFR SERPL CREATININE-BSD FRML MDRD: 28 ML/MIN/1.73SQ M
GLUCOSE SERPL-MCNC: 102 MG/DL (ref 65–140)
HCT VFR BLD AUTO: 39 % (ref 36.5–49.3)
HGB BLD-MCNC: 13.4 G/DL (ref 12–17)
IMM GRANULOCYTES # BLD AUTO: 0.03 THOUSAND/UL (ref 0–0.2)
IMM GRANULOCYTES NFR BLD AUTO: 0 % (ref 0–2)
LYMPHOCYTES # BLD AUTO: 1.46 THOUSANDS/ΜL (ref 0.6–4.47)
LYMPHOCYTES NFR BLD AUTO: 15 % (ref 14–44)
MCH RBC QN AUTO: 31.2 PG (ref 26.8–34.3)
MCHC RBC AUTO-ENTMCNC: 34.4 G/DL (ref 31.4–37.4)
MCV RBC AUTO: 91 FL (ref 82–98)
MONOCYTES # BLD AUTO: 0.75 THOUSAND/ΜL (ref 0.17–1.22)
MONOCYTES NFR BLD AUTO: 8 % (ref 4–12)
NEUTROPHILS # BLD AUTO: 7.14 THOUSANDS/ΜL (ref 1.85–7.62)
NEUTS SEG NFR BLD AUTO: 75 % (ref 43–75)
NRBC BLD AUTO-RTO: 0 /100 WBCS
PLATELET # BLD AUTO: 225 THOUSANDS/UL (ref 149–390)
PMV BLD AUTO: 9.4 FL (ref 8.9–12.7)
POTASSIUM SERPL-SCNC: 3.9 MMOL/L (ref 3.5–5.3)
PROT SERPL-MCNC: 7.4 G/DL (ref 6.4–8.2)
RBC # BLD AUTO: 4.3 MILLION/UL (ref 3.88–5.62)
SODIUM SERPL-SCNC: 134 MMOL/L (ref 136–145)
TROPONIN I SERPL-MCNC: <0.02 NG/ML
WBC # BLD AUTO: 9.57 THOUSAND/UL (ref 4.31–10.16)

## 2019-04-20 PROCEDURE — 96360 HYDRATION IV INFUSION INIT: CPT

## 2019-04-20 PROCEDURE — 99284 EMERGENCY DEPT VISIT MOD MDM: CPT

## 2019-04-20 PROCEDURE — 99284 EMERGENCY DEPT VISIT MOD MDM: CPT | Performed by: EMERGENCY MEDICINE

## 2019-04-20 PROCEDURE — 70450 CT HEAD/BRAIN W/O DYE: CPT

## 2019-04-20 PROCEDURE — 93005 ELECTROCARDIOGRAM TRACING: CPT

## 2019-04-20 PROCEDURE — 80053 COMPREHEN METABOLIC PANEL: CPT | Performed by: EMERGENCY MEDICINE

## 2019-04-20 PROCEDURE — 84484 ASSAY OF TROPONIN QUANT: CPT | Performed by: EMERGENCY MEDICINE

## 2019-04-20 PROCEDURE — 85025 COMPLETE CBC W/AUTO DIFF WBC: CPT | Performed by: EMERGENCY MEDICINE

## 2019-04-20 PROCEDURE — 36415 COLL VENOUS BLD VENIPUNCTURE: CPT | Performed by: EMERGENCY MEDICINE

## 2019-04-20 RX ADMIN — SODIUM CHLORIDE 500 ML: 0.9 INJECTION, SOLUTION INTRAVENOUS at 12:13

## 2019-04-21 LAB
ATRIAL RATE: 84 BPM
P AXIS: 46 DEGREES
PR INTERVAL: 130 MS
QRS AXIS: 27 DEGREES
QRSD INTERVAL: 118 MS
QT INTERVAL: 354 MS
QTC INTERVAL: 418 MS
T WAVE AXIS: 37 DEGREES
VENTRICULAR RATE: 84 BPM

## 2019-04-21 PROCEDURE — 93010 ELECTROCARDIOGRAM REPORT: CPT | Performed by: INTERNAL MEDICINE

## 2019-10-23 ENCOUNTER — HOSPITAL ENCOUNTER (INPATIENT)
Facility: HOSPITAL | Age: 84
LOS: 2 days | Discharge: HOME HEALTH | DRG: 195 | End: 2019-10-25
Attending: EMERGENCY MEDICINE | Admitting: INTERNAL MEDICINE
Payer: COMMERCIAL

## 2019-10-23 ENCOUNTER — APPOINTMENT (EMERGENCY)
Dept: RADIOLOGY | Facility: HOSPITAL | Age: 84
DRG: 195 | End: 2019-10-23
Payer: COMMERCIAL

## 2019-10-23 DIAGNOSIS — J18.9 PNEUMONIA OF LEFT UPPER LOBE DUE TO INFECTIOUS ORGANISM: Primary | ICD-10-CM

## 2019-10-23 PROBLEM — J96.01 SEPSIS WITH ACUTE HYPOXIC RESPIRATORY FAILURE WITHOUT SEPTIC SHOCK (HCC): Status: ACTIVE | Noted: 2019-10-23

## 2019-10-23 PROBLEM — R65.20 SEPSIS WITH ACUTE HYPOXIC RESPIRATORY FAILURE WITHOUT SEPTIC SHOCK (HCC): Status: ACTIVE | Noted: 2019-10-23

## 2019-10-23 PROBLEM — R55 SYNCOPE: Status: ACTIVE | Noted: 2019-10-23

## 2019-10-23 PROBLEM — A41.9 SEPSIS WITH ACUTE HYPOXIC RESPIRATORY FAILURE WITHOUT SEPTIC SHOCK (HCC): Status: ACTIVE | Noted: 2019-10-23

## 2019-10-23 LAB
ALBUMIN SERPL BCP-MCNC: 3.1 G/DL (ref 3.5–5)
ALP SERPL-CCNC: 76 U/L (ref 46–116)
ALT SERPL W P-5'-P-CCNC: 14 U/L (ref 12–78)
ANION GAP SERPL CALCULATED.3IONS-SCNC: 11 MMOL/L (ref 4–13)
AST SERPL W P-5'-P-CCNC: 21 U/L (ref 5–45)
ATRIAL RATE: 84 BPM
BASOPHILS # BLD MANUAL: 0.24 THOUSAND/UL (ref 0–0.1)
BASOPHILS NFR MAR MANUAL: 1 % (ref 0–1)
BILIRUB SERPL-MCNC: 0.9 MG/DL (ref 0.2–1)
BUN SERPL-MCNC: 44 MG/DL (ref 5–25)
CALCIUM SERPL-MCNC: 8.6 MG/DL (ref 8.3–10.1)
CHLORIDE SERPL-SCNC: 99 MMOL/L (ref 100–108)
CO2 SERPL-SCNC: 23 MMOL/L (ref 21–32)
CREAT SERPL-MCNC: 1.93 MG/DL (ref 0.6–1.3)
EOSINOPHIL # BLD MANUAL: 0 THOUSAND/UL (ref 0–0.4)
EOSINOPHIL NFR BLD MANUAL: 0 % (ref 0–6)
ERYTHROCYTE [DISTWIDTH] IN BLOOD BY AUTOMATED COUNT: 12.9 % (ref 11.6–15.1)
GFR SERPL CREATININE-BSD FRML MDRD: 29 ML/MIN/1.73SQ M
GLUCOSE SERPL-MCNC: 119 MG/DL (ref 65–140)
HCT VFR BLD AUTO: 37.4 % (ref 36.5–49.3)
HGB BLD-MCNC: 12.4 G/DL (ref 12–17)
LACTATE SERPL-SCNC: 1.5 MMOL/L (ref 0.5–2)
LYMPHOCYTES # BLD AUTO: 1.44 THOUSAND/UL (ref 0.6–4.47)
LYMPHOCYTES # BLD AUTO: 6 % (ref 14–44)
MCH RBC QN AUTO: 31.3 PG (ref 26.8–34.3)
MCHC RBC AUTO-ENTMCNC: 33.2 G/DL (ref 31.4–37.4)
MCV RBC AUTO: 94 FL (ref 82–98)
MONOCYTES # BLD AUTO: 0.96 THOUSAND/UL (ref 0–1.22)
MONOCYTES NFR BLD: 4 % (ref 4–12)
NEUTROPHILS # BLD MANUAL: 21.4 THOUSAND/UL (ref 1.85–7.62)
NEUTS BAND NFR BLD MANUAL: 4 % (ref 0–8)
NEUTS SEG NFR BLD AUTO: 85 % (ref 43–75)
NRBC BLD AUTO-RTO: 0 /100 WBCS
P AXIS: 38 DEGREES
PLATELET # BLD AUTO: 234 THOUSANDS/UL (ref 149–390)
PLATELET BLD QL SMEAR: ADEQUATE
PMV BLD AUTO: 10 FL (ref 8.9–12.7)
POTASSIUM SERPL-SCNC: 4.1 MMOL/L (ref 3.5–5.3)
PR INTERVAL: 134 MS
PROT SERPL-MCNC: 7.1 G/DL (ref 6.4–8.2)
QRS AXIS: 35 DEGREES
QRSD INTERVAL: 114 MS
QT INTERVAL: 356 MS
QTC INTERVAL: 420 MS
RBC # BLD AUTO: 3.96 MILLION/UL (ref 3.88–5.62)
RBC MORPH BLD: NORMAL
SODIUM SERPL-SCNC: 133 MMOL/L (ref 136–145)
T WAVE AXIS: 19 DEGREES
TOTAL CELLS COUNTED SPEC: 100
TROPONIN I SERPL-MCNC: <0.02 NG/ML
VENTRICULAR RATE: 84 BPM
WBC # BLD AUTO: 24.04 THOUSAND/UL (ref 4.31–10.16)

## 2019-10-23 PROCEDURE — 96374 THER/PROPH/DIAG INJ IV PUSH: CPT

## 2019-10-23 PROCEDURE — 85027 COMPLETE CBC AUTOMATED: CPT | Performed by: EMERGENCY MEDICINE

## 2019-10-23 PROCEDURE — 99285 EMERGENCY DEPT VISIT HI MDM: CPT | Performed by: EMERGENCY MEDICINE

## 2019-10-23 PROCEDURE — 99223 1ST HOSP IP/OBS HIGH 75: CPT | Performed by: INTERNAL MEDICINE

## 2019-10-23 PROCEDURE — 36415 COLL VENOUS BLD VENIPUNCTURE: CPT

## 2019-10-23 PROCEDURE — 99285 EMERGENCY DEPT VISIT HI MDM: CPT

## 2019-10-23 PROCEDURE — 80053 COMPREHEN METABOLIC PANEL: CPT | Performed by: EMERGENCY MEDICINE

## 2019-10-23 PROCEDURE — 93010 ELECTROCARDIOGRAM REPORT: CPT | Performed by: INTERNAL MEDICINE

## 2019-10-23 PROCEDURE — 87040 BLOOD CULTURE FOR BACTERIA: CPT | Performed by: EMERGENCY MEDICINE

## 2019-10-23 PROCEDURE — 71046 X-RAY EXAM CHEST 2 VIEWS: CPT

## 2019-10-23 PROCEDURE — 85007 BL SMEAR W/DIFF WBC COUNT: CPT | Performed by: EMERGENCY MEDICINE

## 2019-10-23 PROCEDURE — 83605 ASSAY OF LACTIC ACID: CPT | Performed by: EMERGENCY MEDICINE

## 2019-10-23 PROCEDURE — 93005 ELECTROCARDIOGRAM TRACING: CPT

## 2019-10-23 PROCEDURE — 84484 ASSAY OF TROPONIN QUANT: CPT | Performed by: EMERGENCY MEDICINE

## 2019-10-23 RX ORDER — AMLODIPINE BESYLATE 10 MG/1
10 TABLET ORAL DAILY
Status: DISCONTINUED | OUTPATIENT
Start: 2019-10-24 | End: 2019-10-24

## 2019-10-23 RX ORDER — ASPIRIN 81 MG/1
81 TABLET ORAL DAILY
Status: DISCONTINUED | OUTPATIENT
Start: 2019-10-24 | End: 2019-10-25 | Stop reason: HOSPADM

## 2019-10-23 RX ORDER — SODIUM CHLORIDE 9 MG/ML
75 INJECTION, SOLUTION INTRAVENOUS CONTINUOUS
Status: DISCONTINUED | OUTPATIENT
Start: 2019-10-23 | End: 2019-10-24

## 2019-10-23 RX ORDER — AZITHROMYCIN 250 MG/1
500 TABLET, FILM COATED ORAL EVERY 24 HOURS
Status: DISCONTINUED | OUTPATIENT
Start: 2019-10-24 | End: 2019-10-25

## 2019-10-23 RX ORDER — PANTOPRAZOLE SODIUM 40 MG/1
40 TABLET, DELAYED RELEASE ORAL
Status: DISCONTINUED | OUTPATIENT
Start: 2019-10-24 | End: 2019-10-25 | Stop reason: HOSPADM

## 2019-10-23 RX ORDER — LISINOPRIL 5 MG/1
5 TABLET ORAL DAILY
Status: DISCONTINUED | OUTPATIENT
Start: 2019-10-24 | End: 2019-10-24

## 2019-10-23 RX ORDER — ONDANSETRON 2 MG/ML
4 INJECTION INTRAMUSCULAR; INTRAVENOUS EVERY 6 HOURS PRN
Status: DISCONTINUED | OUTPATIENT
Start: 2019-10-23 | End: 2019-10-25 | Stop reason: HOSPADM

## 2019-10-23 RX ORDER — PRAVASTATIN SODIUM 40 MG
40 TABLET ORAL DAILY
Status: DISCONTINUED | OUTPATIENT
Start: 2019-10-24 | End: 2019-10-25 | Stop reason: HOSPADM

## 2019-10-23 RX ORDER — ONDANSETRON 2 MG/ML
1 INJECTION INTRAMUSCULAR; INTRAVENOUS ONCE
Status: COMPLETED | OUTPATIENT
Start: 2019-10-23 | End: 2019-10-23

## 2019-10-23 RX ORDER — ACETAMINOPHEN 325 MG/1
650 TABLET ORAL EVERY 6 HOURS PRN
Status: DISCONTINUED | OUTPATIENT
Start: 2019-10-23 | End: 2019-10-25

## 2019-10-23 RX ADMIN — AZITHROMYCIN MONOHYDRATE 500 MG: 500 INJECTION, POWDER, LYOPHILIZED, FOR SOLUTION INTRAVENOUS at 21:30

## 2019-10-23 RX ADMIN — CEFTRIAXONE SODIUM 1000 MG: 10 INJECTION, POWDER, FOR SOLUTION INTRAVENOUS at 20:14

## 2019-10-24 PROBLEM — J18.9 HCAP (HEALTHCARE-ASSOCIATED PNEUMONIA): Status: ACTIVE | Noted: 2019-10-24

## 2019-10-24 PROBLEM — N18.9 CKD (CHRONIC KIDNEY DISEASE): Status: ACTIVE | Noted: 2019-10-24

## 2019-10-24 LAB
ANION GAP SERPL CALCULATED.3IONS-SCNC: 9 MMOL/L (ref 4–13)
BUN SERPL-MCNC: 48 MG/DL (ref 5–25)
CALCIUM SERPL-MCNC: 8.7 MG/DL (ref 8.3–10.1)
CHLORIDE SERPL-SCNC: 103 MMOL/L (ref 100–108)
CO2 SERPL-SCNC: 23 MMOL/L (ref 21–32)
CREAT SERPL-MCNC: 1.99 MG/DL (ref 0.6–1.3)
ERYTHROCYTE [DISTWIDTH] IN BLOOD BY AUTOMATED COUNT: 13 % (ref 11.6–15.1)
GFR SERPL CREATININE-BSD FRML MDRD: 28 ML/MIN/1.73SQ M
GLUCOSE SERPL-MCNC: 92 MG/DL (ref 65–140)
HCT VFR BLD AUTO: 35.5 % (ref 36.5–49.3)
HGB BLD-MCNC: 11.7 G/DL (ref 12–17)
L PNEUMO1 AG UR QL IA.RAPID: NEGATIVE
MAGNESIUM SERPL-MCNC: 1.6 MG/DL (ref 1.6–2.6)
MCH RBC QN AUTO: 31.3 PG (ref 26.8–34.3)
MCHC RBC AUTO-ENTMCNC: 33 G/DL (ref 31.4–37.4)
MCV RBC AUTO: 95 FL (ref 82–98)
PHOSPHATE SERPL-MCNC: 3.7 MG/DL (ref 2.3–4.1)
PLATELET # BLD AUTO: 231 THOUSANDS/UL (ref 149–390)
PMV BLD AUTO: 10.2 FL (ref 8.9–12.7)
POTASSIUM SERPL-SCNC: 4.2 MMOL/L (ref 3.5–5.3)
PROCALCITONIN SERPL-MCNC: 0.84 NG/ML
RBC # BLD AUTO: 3.74 MILLION/UL (ref 3.88–5.62)
S PNEUM AG UR QL: NEGATIVE
SODIUM SERPL-SCNC: 135 MMOL/L (ref 136–145)
WBC # BLD AUTO: 14.2 THOUSAND/UL (ref 4.31–10.16)

## 2019-10-24 PROCEDURE — G8978 MOBILITY CURRENT STATUS: HCPCS

## 2019-10-24 PROCEDURE — 99232 SBSQ HOSP IP/OBS MODERATE 35: CPT | Performed by: INTERNAL MEDICINE

## 2019-10-24 PROCEDURE — 84100 ASSAY OF PHOSPHORUS: CPT | Performed by: NURSE PRACTITIONER

## 2019-10-24 PROCEDURE — 80048 BASIC METABOLIC PNL TOTAL CA: CPT | Performed by: NURSE PRACTITIONER

## 2019-10-24 PROCEDURE — 97163 PT EVAL HIGH COMPLEX 45 MIN: CPT

## 2019-10-24 PROCEDURE — 87449 NOS EACH ORGANISM AG IA: CPT | Performed by: NURSE PRACTITIONER

## 2019-10-24 PROCEDURE — 84145 PROCALCITONIN (PCT): CPT | Performed by: NURSE PRACTITIONER

## 2019-10-24 PROCEDURE — 97116 GAIT TRAINING THERAPY: CPT

## 2019-10-24 PROCEDURE — G8979 MOBILITY GOAL STATUS: HCPCS

## 2019-10-24 PROCEDURE — 83735 ASSAY OF MAGNESIUM: CPT | Performed by: NURSE PRACTITIONER

## 2019-10-24 PROCEDURE — 85027 COMPLETE CBC AUTOMATED: CPT | Performed by: NURSE PRACTITIONER

## 2019-10-24 RX ORDER — LISINOPRIL 5 MG/1
5 TABLET ORAL DAILY
Status: DISCONTINUED | OUTPATIENT
Start: 2019-10-24 | End: 2019-10-25 | Stop reason: HOSPADM

## 2019-10-24 RX ORDER — SODIUM CHLORIDE 9 MG/ML
75 INJECTION, SOLUTION INTRAVENOUS CONTINUOUS
Status: DISPENSED | OUTPATIENT
Start: 2019-10-24 | End: 2019-10-24

## 2019-10-24 RX ORDER — AMLODIPINE BESYLATE 10 MG/1
10 TABLET ORAL DAILY
Status: DISCONTINUED | OUTPATIENT
Start: 2019-10-24 | End: 2019-10-25 | Stop reason: HOSPADM

## 2019-10-24 RX ADMIN — CEFTRIAXONE SODIUM 1000 MG: 10 INJECTION, POWDER, FOR SOLUTION INTRAVENOUS at 20:15

## 2019-10-24 RX ADMIN — ACETAMINOPHEN 650 MG: 325 TABLET, FILM COATED ORAL at 10:55

## 2019-10-24 RX ADMIN — AMLODIPINE BESYLATE 10 MG: 10 TABLET ORAL at 08:19

## 2019-10-24 RX ADMIN — PANTOPRAZOLE SODIUM 40 MG: 40 TABLET, DELAYED RELEASE ORAL at 06:27

## 2019-10-24 RX ADMIN — LISINOPRIL 5 MG: 5 TABLET ORAL at 08:19

## 2019-10-24 RX ADMIN — AZITHROMYCIN 500 MG: 250 TABLET, FILM COATED ORAL at 20:14

## 2019-10-24 RX ADMIN — PRAVASTATIN SODIUM 40 MG: 40 TABLET ORAL at 08:19

## 2019-10-24 RX ADMIN — ENOXAPARIN SODIUM 30 MG: 30 INJECTION SUBCUTANEOUS at 08:19

## 2019-10-24 RX ADMIN — ASPIRIN 81 MG: 81 TABLET, COATED ORAL at 08:19

## 2019-10-24 RX ADMIN — ACETAMINOPHEN 650 MG: 325 TABLET, FILM COATED ORAL at 01:16

## 2019-10-24 RX ADMIN — SODIUM CHLORIDE 75 ML/HR: 0.9 INJECTION, SOLUTION INTRAVENOUS at 01:19

## 2019-10-24 NOTE — ASSESSMENT & PLAN NOTE
· Syncopal event while at skilled nursing facility in dining room  Patient does not recall the event  Continue to monitor on telemetry during hospitalization to rule out arrhythmias  · EKG and troponin in the emergency department did not reveal ischemia  · Check orthostatic vital signs  · Suspect this was secondary to infectious process

## 2019-10-24 NOTE — UTILIZATION REVIEW
Initial Clinical Review    Admission: Date/Time/Statement: Inpatient Admission Orders (From admission, onward)     Ordered        10/23/19 2017  Inpatient Admission  Once                   Orders Placed This Encounter   Procedures    Inpatient Admission     Standing Status:   Standing     Number of Occurrences:   1     Order Specific Question:   Admitting Physician     Answer:   Sonal Ca     Order Specific Question:   Level of Care     Answer:   Med Surg [16]     Order Specific Question:   Estimated length of stay     Answer:   More than 2 Midnights     Order Specific Question:   Certification     Answer:   I certify that inpatient services are medically necessary for this patient for a duration of greater than two midnights  See H&P and MD Progress Notes for additional information about the patient's course of treatment  ED Arrival Information     Expected Arrival Acuity Means of Arrival Escorted By Service Admission Type    - 10/23/2019 19:12 Emergent Ambulance [facility] Emergency    [facility]        Chief Complaint   Patient presents with    Syncope     Pt presents to ER from SNF after a syncopal episode during dinner, woke up without issue and had 1 episode of vomitting  Patient arrives to ER denying any current complaints  Assessment/Plan: This is  80year old male  From independent living at [facility] to ED via EMS admitted to inpatient due to pneumonia  Presented due to loss of consciousness when eating dinner  He had episode of vomiting  Pre hospital  Hypoxic and arrived on oxygen  On exam has decreased breath sounds  WBC 24 04    Bun 44  Creatinine 1 93  CxR showed left upper lobe pneumonia  Blood cultures done  IV antibiotics and IV hydration  in progress       ED Triage Vitals   Temperature Pulse Respirations Blood Pressure SpO2   10/23/19 1915 10/23/19 1915 10/23/19 1915 10/23/19 1915 10/23/19 1915   98 4 °F (36 9 °C) 82 18 113/60 90 %      Temp Source Heart Rate Source Patient Position - Orthostatic VS BP Location FiO2 (%)   10/23/19 1915 10/23/19 1915 10/23/19 1915 10/23/19 1915 10/23/19 1920   Oral Monitor Lying Right arm 3      Pain Score       10/23/19 1915       No Pain        Wt Readings from Last 1 Encounters:   10/23/19 67 9 kg (149 lb 11 1 oz)     Additional Vital Signs:   10/24/19 0700  --  75  18  140/64  92  94 %  Nasal cannula  Sitting   10/23/19 2305  97 6 °F (36 4 °C)  82  18  104/59  75  92 %  Nasal cannula  Lying   10/23/19 2120  98 °F (36 7 °C)  81  18  98/53  72  94 %  Nasal cannula  Sitting   10/23/19 1920  --  --  --  --  --  93 %  Nasal cannula  --   10/23/19 1918  --  --  --  --  --  86 %Abnormal   None (Room air)  --       Pertinent Labs/Diagnostic Test Results:   10/23/2019 CxR - Lingular opacity, possibly pneumonia versus atelectasis or aspiration    Results from last 7 days   Lab Units 10/24/19  0534 10/23/19  1927   WBC Thousand/uL 14 20* 24 04*   HEMOGLOBIN g/dL 11 7* 12 4   HEMATOCRIT % 35 5* 37 4   PLATELETS Thousands/uL 231 234   BANDS PCT %  --  4         Results from last 7 days   Lab Units 10/24/19  0534 10/23/19  1927   SODIUM mmol/L 135* 133*   POTASSIUM mmol/L 4 2 4 1   CHLORIDE mmol/L 103 99*   CO2 mmol/L 23 23   ANION GAP mmol/L 9 11   BUN mg/dL 48* 44*   CREATININE mg/dL 1 99* 1 93*   EGFR ml/min/1 73sq m 28 29   CALCIUM mg/dL 8 7 8 6   MAGNESIUM mg/dL 1 6  --    PHOSPHORUS mg/dL 3 7  --      Results from last 7 days   Lab Units 10/23/19  1927   AST U/L 21   ALT U/L 14   ALK PHOS U/L 76   TOTAL PROTEIN g/dL 7 1   ALBUMIN g/dL 3 1*   TOTAL BILIRUBIN mg/dL 0 90         Results from last 7 days   Lab Units 10/24/19  0534 10/23/19  1927   GLUCOSE RANDOM mg/dL 92 119       Results from last 7 days   Lab Units 10/23/19  1927   TROPONIN I ng/mL <0 02       Results from last 7 days   Lab Units 10/23/19  2011   LACTIC ACID mmol/L 1 5       Results from last 7 days   Lab Units 10/23/19  1927   TOTAL COUNTED  100     ED Treatment: blood cultures   Medication Administration from 10/23/2019 1911 to 10/23/2019 2110       Date/Time Order Dose Route Action Comments     10/23/2019 1915 ondansetron (FOR EMS ONLY) (ZOFRAN) 4 mg/2 mL injection 4 mg 0 mg Does not apply Given to EMS      10/23/2019 2014 ceftriaxone (ROCEPHIN) 1 g/50 mL in dextrose IVPB 1,000 mg Intravenous New Bag         Past Medical History:   Diagnosis Date    Arthritis     Cataracts, both eyes     Hyperlipidemia     Hypertension     Problems with hearing     Stroke [facility])      Present on Admission:   Syncope   Essential hypertension   HLD (hyperlipidemia)   Sepsis ([facility] [address] )   HCAP (healthcare-associated pneumonia)      Admitting Diagnosis: Syncope [R55]  Pneumonia of left upper lobe due to infectious organism [facility]) [J18 1]  Age/Sex: 80 y o  male  Admission Orders: 10/23/2019  2017 INPATIENT      Medications:  amLODIPine 10 mg Oral Daily   aspirin 81 mg Oral Daily   cefTRIAXone 1,000 mg Intravenous Q24H   And      azithromycin 500 mg Oral Q24H   enoxaparin 30 mg Subcutaneous Daily   lisinopril 5 mg Oral Daily   pantoprazole 40 mg Oral Early Morning   pravastatin 40 mg Oral Daily       sodium chloride 75 mL/hr Intravenous Continuous     Acetaminophen - used x 1  650 mg Oral Q6H PRN   ondansetron 4 mg Intravenous Q6H PRN     Telemetry       Network Utilization Review Department  [email or web address]  ATTENTION: Please call with any questions or concerns to [phone] and carefully listen to the prompts so that you are directed to the right person  All voicemails are confidential   Filipe Ward all requests for admission clinical reviews, approved or denied determinations and any other requests to dedicated fax number below belonging to the campus where the patient is receiving treatment    FACILITY NAME UR FAX NUMBER   ADMISSION DENIALS (Administrative/Medical Necessity) [phone]   PARENT CHILD HEALTH (Maternity/NICU/Pediatrics) [place] [address] [facility] [phone]   Gerson Quinones [phone]   [address] [phone]   Vandana Monaco [address] [phone]

## 2019-10-24 NOTE — PLAN OF CARE
Problem: PAIN - ADULT  Goal: Verbalizes/displays adequate comfort level or baseline comfort level  Description  Interventions:  - Encourage patient to monitor pain and request assistance  - Assess pain using appropriate pain scale  - Administer analgesics based on type and severity of pain and evaluate response  - Implement non-pharmacological measures as appropriate and evaluate response  - Consider cultural and social influences on pain and pain management  - Notify physician/advanced practitioner if interventions unsuccessful or patient reports new pain  Outcome: Progressing     Problem: INFECTION - ADULT  Goal: Absence or prevention of progression during hospitalization  Description  INTERVENTIONS:  - Assess and monitor for signs and symptoms of infection  - Monitor lab/diagnostic results  - Monitor all insertion sites, i e  indwelling lines, tubes, and drains  - Monitor endotracheal if appropriate and nasal secretions for changes in amount and color  - [place] appropriate cooling/warming therapies per order  - Administer medications as ordered  - Instruct and encourage patient and family to use good hand hygiene technique  - Identify and instruct in appropriate isolation precautions for identified infection/condition  Outcome: Progressing  Goal: Absence of fever/infection during neutropenic period  Description  INTERVENTIONS:  - Monitor WBC    Outcome: Progressing     Problem: SAFETY ADULT  Goal: Patient will remain free of falls  Description  INTERVENTIONS:  - Assess patient frequently for physical needs  -  Identify cognitive and physical deficits and behaviors that affect risk of falls    -  [place] fall precautions as indicated by assessment   - Educate patient/family on patient safety including physical limitations  - Instruct patient to call for assistance with activity based on assessment  - Modify environment to reduce risk of injury  - Consider OT/PT consult to assist with strengthening/mobility  Outcome: Progressing  Goal: Maintain or return to baseline ADL function  Description  INTERVENTIONS:  -  Assess patient's ability to carry out ADLs; assess patient's baseline for ADL function and identify physical deficits which impact ability to perform ADLs (bathing, care of mouth/teeth, toileting, grooming, dressing, etc )  - Assess/evaluate cause of self-care deficits   - Assess range of motion  - Assess patient's mobility; develop plan if impaired  - Assess patient's need for assistive devices and provide as appropriate  - Encourage maximum independence but intervene and supervise when necessary  - Involve family in performance of ADLs  - Assess for home care needs following discharge   - Consider OT consult to assist with ADL evaluation and planning for discharge  - Provide patient education as appropriate  Outcome: Progressing  Goal: Maintain or return mobility status to optimal level  Description  INTERVENTIONS:  - Assess patient's baseline mobility status (ambulation, transfers, stairs, etc )    - Identify cognitive and physical deficits and behaviors that affect mobility  - Identify mobility aids required to assist with transfers and/or ambulation (gait belt, sit-to-stand, lift, walker, cane, etc )  - [place] fall precautions as indicated by assessment  - Record patient progress and toleration of activity level on Mobility SBAR; progress patient to next Phase/Stage  - Instruct patient to call for assistance with activity based on assessment  - Consider rehabilitation consult to assist with strengthening/weightbearing, etc   Outcome: Progressing     Problem: DISCHARGE PLANNING  Goal: Discharge to home or other facility with appropriate resources  Description  INTERVENTIONS:  - Identify barriers to discharge w/patient and caregiver  - Arrange for needed discharge resources and transportation as appropriate  - Identify discharge learning needs (meds, wound care, etc )  - Arrange for interpretive services to assist at discharge as needed  - Refer to Case Management Department for coordinating discharge planning if the patient needs post-hospital services based on physician/advanced practitioner order or complex needs related to functional status, cognitive ability, or social support system  Outcome: Progressing     Problem: Knowledge Deficit  Goal: Patient/family/caregiver demonstrates understanding of disease process, treatment plan, medications, and discharge instructions  Description  Complete learning assessment and assess knowledge base  Interventions:  - Provide teaching at level of understanding  - Provide teaching via preferred learning methods  Outcome: Progressing     Problem: Potential for Falls  Goal: Patient will remain free of falls  Description  INTERVENTIONS:  - Assess patient frequently for physical needs  -  Identify cognitive and physical deficits and behaviors that affect risk of falls    -  [place] fall precautions as indicated by assessment   - Educate patient/family on patient safety including physical limitations  - Instruct patient to call for assistance with activity based on assessment  - Modify environment to reduce risk of injury  - Consider OT/PT consult to assist with strengthening/mobility  Outcome: Progressing

## 2019-10-24 NOTE — PLAN OF CARE
Problem: PAIN - ADULT  Goal: Verbalizes/displays adequate comfort level or baseline comfort level  Description  Interventions:  - Encourage patient to monitor pain and request assistance  - Assess pain using appropriate pain scale  - Administer analgesics based on type and severity of pain and evaluate response  - Implement non-pharmacological measures as appropriate and evaluate response  - Consider cultural and social influences on pain and pain management  - Notify physician/advanced practitioner if interventions unsuccessful or patient reports new pain  Outcome: Progressing     Problem: INFECTION - ADULT  Goal: Absence or prevention of progression during hospitalization  Description  INTERVENTIONS:  - Assess and monitor for signs and symptoms of infection  - Monitor lab/diagnostic results  - Monitor all insertion sites, i e  indwelling lines, tubes, and drains  - Monitor endotracheal if appropriate and nasal secretions for changes in amount and color  - [place] appropriate cooling/warming therapies per order  - Administer medications as ordered  - Instruct and encourage patient and family to use good hand hygiene technique  - Identify and instruct in appropriate isolation precautions for identified infection/condition  Outcome: Progressing  Goal: Absence of fever/infection during neutropenic period  Description  INTERVENTIONS:  - Monitor WBC    Outcome: Progressing     Problem: SAFETY ADULT  Goal: Patient will remain free of falls  Description  INTERVENTIONS:  - Assess patient frequently for physical needs  -  Identify cognitive and physical deficits and behaviors that affect risk of falls    -  [place] fall precautions as indicated by assessment   - Educate patient/family on patient safety including physical limitations  - Instruct patient to call for assistance with activity based on assessment  - Modify environment to reduce risk of injury  - Consider OT/PT consult to assist with strengthening/mobility  Outcome: Progressing  Goal: Maintain or return to baseline ADL function  Description  INTERVENTIONS:  -  Assess patient's ability to carry out ADLs; assess patient's baseline for ADL function and identify physical deficits which impact ability to perform ADLs (bathing, care of mouth/teeth, toileting, grooming, dressing, etc )  - Assess/evaluate cause of self-care deficits   - Assess range of motion  - Assess patient's mobility; develop plan if impaired  - Assess patient's need for assistive devices and provide as appropriate  - Encourage maximum independence but intervene and supervise when necessary  - Involve family in performance of ADLs  - Assess for home care needs following discharge   - Consider OT consult to assist with ADL evaluation and planning for discharge  - Provide patient education as appropriate  Outcome: Progressing  Goal: Maintain or return mobility status to optimal level  Description  INTERVENTIONS:  - Assess patient's baseline mobility status (ambulation, transfers, stairs, etc )    - Identify cognitive and physical deficits and behaviors that affect mobility  - Identify mobility aids required to assist with transfers and/or ambulation (gait belt, sit-to-stand, lift, walker, cane, etc )  - [place] fall precautions as indicated by assessment  - Record patient progress and toleration of activity level on Mobility SBAR; progress patient to next Phase/Stage  - Instruct patient to call for assistance with activity based on assessment  - Consider rehabilitation consult to assist with strengthening/weightbearing, etc   Outcome: Progressing     Problem: DISCHARGE PLANNING  Goal: Discharge to home or other facility with appropriate resources  Description  INTERVENTIONS:  - Identify barriers to discharge w/patient and caregiver  - Arrange for needed discharge resources and transportation as appropriate  - Identify discharge learning needs (meds, wound care, etc )  - Arrange for interpretive services to assist at discharge as needed  - Refer to Case Management Department for coordinating discharge planning if the patient needs post-hospital services based on physician/advanced practitioner order or complex needs related to functional status, cognitive ability, or social support system  Outcome: Progressing     Problem: Knowledge Deficit  Goal: Patient/family/caregiver demonstrates understanding of disease process, treatment plan, medications, and discharge instructions  Description  Complete learning assessment and assess knowledge base    Interventions:  - Provide teaching at level of understanding  - Provide teaching via preferred learning methods  Outcome: Progressing

## 2019-10-24 NOTE — PHYSICAL THERAPY NOTE
PHYSICAL THERAPY EVALUATION NOTE    Patient Name: Rian Evans  IBWGQ'X Date: 10/24/2019  AGE:   80 y o  Mrn:   [id number]  ADMIT DX:  Syncope [R55]  Pneumonia of left upper lobe due to infectious organism ([address] ) [J18 1]    Past Medical History:   Diagnosis Date    Arthritis     Cataracts, both eyes     Hyperlipidemia     Hypertension     Problems with hearing     Stroke [facility])      Length Of Stay: 1  PHYSICAL THERAPY EVALUATION :    10/24/19 0911   Pain Assessment   Pain Assessment 0-10   Pain Score 3   Pain Location Back   Pain Orientation Lower   Home Living   Type of Home Other (Comment)  [employer] independent living)   Additional Comments ambulates w/o device in apartment and uses roller walker outside  independent w/ ADLs  no falls in last 6 months  no history of supplemental oxygen use  Prior Function   Comments pt seen supine in bed  agreed to PT eval  c/o back pain  pt wants to go home  Restrictions/Precautions   Other Precautions Chair Alarm; Bed Alarm; Fall Risk;Telemetry;O2;Pain;Multiple lines;Hard of hearing   General   Additional Pertinent History 3L oxygen via nasal cannula  resting pulse ox 97% and 83 BPM, active 96% and 102 BPM    Family/Caregiver Present No   Cognition   Arousal/Participation Cooperative   Orientation Level Oriented to person;Oriented to place; Other (Comment)  (pt was identified w/ full name, birth date)   Following Commands Follows one step commands with increased time or repetition   RUE Assessment   RUE Assessment WFL  (4-/5, shoulder 3/5)   LUE Assessment   LUE Assessment WFL  (4-/5, shoulder 3/5)   RLE Assessment   RLE Assessment WFL  (4-/5, ankle 3+/5)   LLE Assessment   LLE Assessment WFL  (4-/5, ankle 3+/5)   Coordination   Movements are Fluid and Coordinated 0   Coordination and Movement Description impaired coordination all extremities   Sensation X  (impaired hearing  impaired light touch bilateral hands )   Bed Mobility   Supine to Sit 6  Modified independent   Additional items HOB elevated; Increased time required   Transfers   Sit to Stand 5  Supervision   Additional items Increased time required   Stand to Sit 5  Supervision   Additional items Impulsive  (uncontrolled descent to chair)   Ambulation/Elevation   Gait pattern Decreased foot clearance; Foward flexed; Short stride; Excessively slow   Gait Assistance 4  Minimal assist  (w/o device  supervision w/ roller walker)   Additional items Assist x 1   Assistive Device None;Rolling walker   Distance 20 feet w/o assistive device  140 feet w/ roller walker  seated rest break x 1 minute  (additional not possible due to fatigue)   Balance   Static Sitting Fair +   Dynamic Sitting Fair   Static Standing Fair -   Ambulatory Fair -  (w/ roller walker)   Activity Tolerance   Activity Tolerance Patient limited by fatigue;Patient limited by pain   Nurse Made Aware spoke to Raheel MANN [facility]   Assessment   Prognosis Fair   Problem List Decreased strength;Decreased endurance; Impaired balance;Decreased mobility; Decreased coordination;Decreased safety awareness; Impaired hearing;Pain   Assessment Pt presents with syncopal event in dining room at [facility] during dinner this evening  Dx: HCAP, sepsis, and syncope  order placed for PT eval and tx, w/ activity order of up w/ A  pt presents w/ comorbidities of arthritis, hyperlipidemia, HTN, and CVA and personal factors of advanced age, mobilizing w/ assistive device and hearing impairments  pt presents w/ pain, weakness, decreased endurance, impaired balance, gait deviations, altered sensation, impaired hearing, impaired coordination, decreased safety awareness and fall risk   these impairments are evident in findings from physical examination (weakness, altered sensation and impaired coordination), mobility assessment (need for standby to min assist w/ all phases of mobility when usually mobilizing independently, tolerance to only 140 feet of ambulation and need for cueing for mobility and breathing technique), and Barthel Index: 70/100  pt needed input for task focus and mobility technique/safety  pt is at risk for falls due to physical and safety awareness deficits  pt's clinical presentation is unstable/unpredictable (evident in need for assist w/ all phases of mobility when usually mobilizing independently, tolerance to only 140 feet of ambulation, pain impacting overall mobility status, need for supplemental oxygen in order to maintain oxygen saturation and need for input for task focus and mobility technique/safety)  pt needs inpatient PT tx to improve mobility deficits  discharge recommendation is for PT and [facility]HS to reduce fall risk and maximize level of functional independence  Pt would benefit from OT consult to address safety awareness  Goals   Patient Goals go home   STG Expiration Date 11/03/19   Short Term Goal #1 pt will: Increase bilateral LE strength 1/2 grade to facilitate independent mobility, Perform all transfers independently to improve independence, Ambulate 400 ft  with least restrictive assistive device independently w/o LOB, Increase all balance 1/2 grade to decrease risk for falls, Complete exercise program independently, Tolerate 3 hr OOB to faciliate upright tolerance and Improve Barthel Index score to 90 or greater to facilitate independence   Plan   Treatment/Interventions Functional transfer training;LE strengthening/ROM; Therapeutic exercise; Endurance training;Cognitive reorientation;Patient/family training;Equipment eval/education;Gait training   PT Frequency 5x/wk   Recommendation   Recommendation OT consult; Other (Comment)  (PT at [facility])   Equipment Recommended Other (Comment)  (roller walker)   Barthel Index   Feeding 10   Bathing 0   Grooming Score 5   Dressing Score 10   Bladder Score 10   Bowels Score 10   Toilet Use Score 5   Transfers (Bed/Chair) Score 10 Mobility (Level Surface) Score 10   Stairs Score 0   Barthel Index Score 70     Skilled PT recommended while in hospital and upon DC to progress pt toward treatment goals       Bailey Patel, PT

## 2019-10-24 NOTE — ASSESSMENT & PLAN NOTE
Syncopal event while at skilled nursing facility in dining room  Patient does not recall the event  Continue to monitor on telemetry during hospitalization to rule out arrhythmias  EKG and troponin in the emergency department did not reveal ischemia  Check orthostatic vital signs  Suspect this was secondary to infectious process  No events on telemetry

## 2019-10-24 NOTE — ASSESSMENT & PLAN NOTE
· POA- left lingular infiltrate on chest x-ray with leukocytosis and hypoxia  · Low risk for multi-drug resistant, treat with Rocephin and Zithromax  · Resides in a skilled nursing facility  · Check sputum culture a urinary antigens for Legionella and strep pneumonia  · Blood cultures are pending, trend PCT  · Continue supplemental oxygen as needed  · Continue gentle hydration  · No lactic acidosis

## 2019-10-24 NOTE — PROGRESS NOTES
Progress Note - Jung Sweet 2/16/1927, 80 y o  male MRN: [id number]    Unit/Bed#: -01 Encounter: [id number]    Primary Care Provider: Nila Ogden MD   Date and time admitted to hospital: 10/23/2019  7:12 PM        CKD (chronic kidney disease)  Assessment & Plan  Creatinine was 2 6 months back  Likely at baseline  Will continue to trend  Sepsis ([address] )  Assessment & Plan  POA- left lingular infiltrate on chest x-ray with leukocytosis and hypoxia  Low risk for multi-drug resistant, treat with Rocephin and Zithromax  Resides in a skilled nursing facility  Check sputum culture a urinary antigens for Legionella and strep pneumonia  Blood cultures are pending, trend PCT  Continue supplemental oxygen as needed  Continue gentle hydration  No lactic acidosis      Syncope  Assessment & Plan  Syncopal event while at skilled nursing facility in dining room  Patient does not recall the event  Continue to monitor on telemetry during hospitalization to rule out arrhythmias  EKG and troponin in the emergency department did not reveal ischemia  Check orthostatic vital signs  Suspect this was secondary to infectious process  No events on telemetry  Essential hypertension  Assessment & Plan  Continue home regimen, avoid hypotension  * HCAP (healthcare-associated pneumonia)  Assessment & Plan  POA- left lingular infiltrate on chest x-ray with leukocytosis  Low risk for multi-drug resistant, treat with Rocephin and Zithromax  Resides in a skilled nursing facility  Check sputum culture a urinary antigens for Legionella and strep pneumonia  Blood cultures are pending, trend PCT  Continue supplemental oxygen as needed  Continue gentle hydration  Follow up cultures and procal         VTE Pharmacologic Prophylaxis:   Pharmacologic: Heparin  Mechanical VTE Prophylaxis in Place: Yes    Patient Centered Rounds: I have performed bedside rounds with nursing staff today      Discussions with Specialists or Other Care Team Provider: Discussed with patient and with nursing  Education and Discussions with Family / Patient: as above  Time Spent for Care: 30 minutes  More than 50% of total time spent on counseling and coordination of care as described above  Current Length of Stay: 1 day(s)    Current Patient Status: Inpatient   Certification Statement: The patient will continue to require additional inpatient hospital stay due to IVABx  Discharge Plan: Not medically stable for DC  Code Status: Level 1 - Full Code      Subjective:   Patient seen and examined  Feeling "okay"    Objective:     Vitals:   Temp (24hrs), Av °F (36 7 °C), Min:97 6 °F (36 4 °C), Max:98 4 °F (36 9 °C)    Temp:  [97 6 °F (36 4 °C)-98 4 °F (36 9 °C)] 97 6 °F (36 4 °C)  HR:  [75-82] 75  Resp:  [18] 18  BP: ()/(53-64) 140/64  SpO2:  [86 %-94 %] 94 %  Body mass index is 23 45 kg/m²  Input and Output Summary (last 24 hours): Intake/Output Summary (Last 24 hours) at 10/24/2019 1438  Last data filed at 10/24/2019 1300  Gross per 24 hour   Intake 240 ml   Output 625 ml   Net -385 ml       Physical Exam:     Physical Exam   Constitutional: He appears well-developed  No distress  HENT:   Head: Normocephalic  Mouth/Throat: No oropharyngeal exudate  Eyes: Pupils are equal, round, and reactive to light  Right eye exhibits no discharge  Left eye exhibits no discharge  No scleral icterus  Neck: Normal range of motion  No JVD present  No tracheal deviation present  No thyromegaly present  Cardiovascular: Normal rate  Exam reveals no gallop and no friction rub  No murmur heard  Pulmonary/Chest: Effort normal  No stridor  No respiratory distress  He has no wheezes  He has rales  He exhibits no tenderness  Abdominal: Soft  He exhibits no distension and no mass  There is no tenderness  There is no rebound and no guarding  No hernia  Musculoskeletal: He exhibits no edema, tenderness or deformity     Lymphadenopathy:     He has no cervical adenopathy  Neurological: He is alert  He displays normal reflexes  No cranial nerve deficit or sensory deficit  He exhibits normal muscle tone  Coordination normal    Skin: Capillary refill takes less than 2 seconds  No rash noted  He is not diaphoretic  No erythema  There is pallor  Psychiatric: He has a normal mood and affect  Additional Data:     Labs:    Results from last 7 days   Lab Units 10/24/19  0534 10/23/19  1927   WBC Thousand/uL 14 20* 24 04*   HEMOGLOBIN g/dL 11 7* 12 4   HEMATOCRIT % 35 5* 37 4   PLATELETS Thousands/uL 231 234   BANDS PCT %  --  4   LYMPHO PCT %  --  6*   MONO PCT %  --  4   EOS PCT %  --  0     Results from last 7 days   Lab Units 10/24/19  0534 10/23/19  1927   SODIUM mmol/L 135* 133*   POTASSIUM mmol/L 4 2 4 1   CHLORIDE mmol/L 103 99*   CO2 mmol/L 23 23   BUN mg/dL 48* 44*   CREATININE mg/dL 1 99* 1 93*   ANION GAP mmol/L 9 11   CALCIUM mg/dL 8 7 8 6   ALBUMIN g/dL  --  3 1*   TOTAL BILIRUBIN mg/dL  --  0 90   ALK PHOS U/L  --  76   ALT U/L  --  14   AST U/L  --  21   GLUCOSE RANDOM mg/dL 92 119                 Results from last 7 days   Lab Units 10/24/19  0534 10/23/19  2011   LACTIC ACID mmol/L  --  1 5   PROCALCITONIN ng/ml 0 84*  --            * I Have Reviewed All Lab Data Listed Above  * Additional Pertinent Lab Tests Reviewed:  [address] Admission Reviewed    Imaging:    Imaging Reports Reviewed Today Include:   CXR -   "Lingular opacity, possibly pneumonia versus atelectasis or aspiration "  Imaging Personally Reviewed by Myself Includes:  As above     Recent Cultures (last 7 days):     Results from last 7 days   Lab Units 10/24/19  0543   LEGIONELLA URINARY ANTIGEN  Negative       Last 24 Hours Medication List:     Current Facility-Administered Medications:  acetaminophen 650 mg Oral Q6H PRN Argenis S David, CRNP   amLODIPine 10 mg Oral Daily Argenis S David, CRNP   aspirin 81 mg Oral Daily Argenis S David, CRNP   cefTRIAXone 1,000 mg Intravenous Q24H Argenis S David, CRNP   And       azithromycin 500 mg Oral Q24H Argenis S David, CRNP   enoxaparin 30 mg Subcutaneous Daily Argenis S David, CRNP   lisinopril 5 mg Oral Daily Argenis S David, CRNP   ondansetron 4 mg Intravenous Q6H PRN Argenis S David, CRNP   pantoprazole 40 mg Oral Early Morning Argenis S David, CRNP   pravastatin 40 mg Oral Daily Argenis S David, CRNP        Today, Patient Was Seen By: Gardenia Sever, MD    ** Please Note: Dictation voice to text software may have been used in the creation of this document   **

## 2019-10-24 NOTE — H&P
H&P- Paul Sung 2/16/1927, 80 y o  male MRN: [id number]    Unit/Bed#: -01 Encounter: [id number]    Primary Care Provider: Arianna Ba MD   Date and time admitted to hospital: 10/23/2019  7:12 PM        * HCAP (healthcare-associated pneumonia)  Assessment & Plan  · POA- left lingular infiltrate on chest x-ray with leukocytosis  · Low risk for multi-drug resistant, treat with Rocephin and Zithromax  · Resides in a skilled nursing facility  · Check sputum culture a urinary antigens for Legionella and strep pneumonia  · Blood cultures are pending, trend PCT  · Continue supplemental oxygen as needed  · Continue gentle hydration      Sepsis ([address] )  Assessment & Plan  · POA- left lingular infiltrate on chest x-ray with leukocytosis and hypoxia  · Low risk for multi-drug resistant, treat with Rocephin and Zithromax  · Resides in a skilled nursing facility  · Check sputum culture a urinary antigens for Legionella and strep pneumonia  · Blood cultures are pending, trend PCT  · Continue supplemental oxygen as needed  · Continue gentle hydration  · No lactic acidosis      Syncope  Assessment & Plan  · Syncopal event while at skilled nursing facility in dining room  Patient does not recall the event  Continue to monitor on telemetry during hospitalization to rule out arrhythmias  · EKG and troponin in the emergency department did not reveal ischemia  · Check orthostatic vital signs  · Suspect this was secondary to infectious process  HLD (hyperlipidemia)  Assessment & Plan  Continue pravastatin    Essential hypertension  Assessment & Plan  · Continue home regimen, avoid hypotension  VTE Prophylaxis: Enoxaparin (Lovenox)  / sequential compression device   Code Status:  Full  POLST: POLST is not applicable to this patient  Discussion with family:  Patient    Anticipated Length of Stay:  Patient will be admitted on an Inpatient basis with an anticipated length of stay of  > 2 midnights     Justification for Hospital Stay:  Healthcare associated pneumonia with syncopal event evaluation    Total Time for Visit, including Counseling / Coordination of Care: 45 minutes  Greater than 50% of this total time spent on direct patient counseling and coordination of care  Chief Complaint:   Syncope    History of Present Illness:    Thuan Gomez is a 80 y o  male who presents with syncopal event in dining room at [facility] during dinner this evening  Patient states he felt well but has been coughing over the past day after he was seen in his primary care provider's office on Monday to receive his influenza vaccine  He endorses history of syncopal events and denies chest pain, dizziness or headache  Upon awakening after the syncopal episode he vomited 1 time and was brought to the emergency department, he was found to have lingular infiltrate along with leukocytosis of 24,000 with a admission for healthcare associated pneumonia  Review of Systems:    Review of Systems   Constitutional: Positive for fever  Negative for activity change, appetite change, chills, diaphoresis, fatigue and unexpected weight change  HENT: Negative for congestion, sore throat and trouble swallowing  Eyes: Negative for visual disturbance  Respiratory: Positive for cough  Negative for chest tightness  Cardiovascular: Negative for chest pain, palpitations and leg swelling  Gastrointestinal: Positive for nausea and vomiting  Negative for abdominal distention, abdominal pain, constipation and diarrhea  Endocrine: Negative for polydipsia, polyphagia and polyuria  Genitourinary: Negative for decreased urine volume, difficulty urinating and dysuria  Musculoskeletal: Negative for arthralgias, gait problem and myalgias  Skin: Negative for rash  Allergic/Immunologic: Negative for immunocompromised state  Neurological: Positive for syncope and weakness  Negative for dizziness, seizures, numbness and headaches  Hematological: Does not bruise/bleed easily  Psychiatric/Behavioral: Negative for sleep disturbance  The patient is not nervous/anxious  All other systems reviewed and are negative  Past Medical and Surgical History:     Past Medical History:   Diagnosis Date    Arthritis     Cataracts, both eyes     Hyperlipidemia     Hypertension     Problems with hearing     Stroke [facility])        Past Surgical History:   Procedure Laterality Date    CAROTID ARTERY ANGIOPLASTY      CATARACT EXTRACTION         Meds/Allergies:    Prior to Admission medications    Medication Sig Start Date End Date Taking? Authorizing Provider   amLODIPine (NORVASC) 10 mg tablet Take 1 tablet by mouth daily 6/24/17  Yes Vincent Benson DO   aspirin (ECOTRIN LOW STRENGTH) 81 mg EC tablet Take 81 mg by mouth daily   Yes Historical Provider, MD   Cholecalciferol (VITAMIN D3) 1000 units CAPS Take 5,000 Units by mouth   Yes Historical Provider, MD   lisinopril (ZESTRIL) 5 mg tablet Take 1 tablet (5 mg total) by mouth daily 8/21/18  Yes Amaya Garza MD   multivitamin [facility]) TABS Take 1 tablet by mouth daily With B complex   Yes Historical Provider, MD   Omega-3 Fatty Acids (FISH OIL) 1,000 mg Take 1 capsule by mouth daily 6/24/17  Yes Vincent Benson DO   pravastatin (PRAVACHOL) 40 mg tablet Take 40 mg by mouth daily   Yes Historical Provider, MD   calcium-vitamin D 250-100 MG-UNIT per tablet Take 1 tablet by mouth 2 (two) times a day    Historical Provider, MD   hydrochlorothiazide (HYDRODIURIL) 25 mg tablet Take 1 tablet (25 mg total) by mouth daily 8/21/18   Amaya Garza MD   omeprazole (PriLOSEC) 20 mg delayed release capsule  7/17/18   Historical Provider, MD     I have reviewed home medications with patient personally  Allergies: No Known Allergies    Social History:     Marital Status:     Occupation:  Retired  Patient Pre-hospital Living Situation:  Skilled nursing facility  Patient Pre-hospital Level of Mobility: Assisted  Patient Pre-hospital Diet Restrictions:  Cardiac  Substance Use History:   Social History     Substance and Sexual Activity   Alcohol Use No     Social History     Tobacco Use   Smoking Status Never Smoker   Smokeless Tobacco Never Used     Social History     Substance and Sexual Activity   Drug Use No       Family History:    Family History   Problem Relation Age of Onset    Hypertension Mother     Hypertension Daughter        Physical Exam:     Vitals:   Blood Pressure: 104/59 (10/23/19 2305)  Pulse: 82 (10/23/19 2305)  Temperature: 97 6 °F (36 4 °C) (10/23/19 2305)  Temp Source: Oral (10/23/19 2305)  Respirations: 18 (10/23/19 2305)  Height: 5' 7" (170 2 cm) (10/23/19 2120)  Weight - Scale: 67 9 kg (149 lb 11 1 oz) (10/23/19 2120)  SpO2: 92 % (10/23/19 2305)    Physical Exam   Constitutional: He is oriented to person, place, and time  He appears well-developed and well-nourished  HENT:   Head: Normocephalic and atraumatic  Mouth/Throat: Oropharynx is clear and moist    Eyes: Conjunctivae are normal    Neck: Normal range of motion  Neck supple  Cardiovascular: Normal rate, regular rhythm, normal heart sounds and intact distal pulses  Pulmonary/Chest: Effort normal  He has decreased breath sounds  Abdominal: Soft  Bowel sounds are normal    Musculoskeletal: Normal range of motion  He exhibits no edema or tenderness  Neurological: He is alert and oriented to person, place, and time  No cranial nerve deficit  Skin: Skin is warm and dry  Capillary refill takes less than 2 seconds  Psychiatric: He has a normal mood and affect  Nursing note and vitals reviewed  Additional Data:     Lab Results: I have personally reviewed pertinent reports        Results from last 7 days   Lab Units 10/23/19  1927   WBC Thousand/uL 24 04*   HEMOGLOBIN g/dL 12 4   HEMATOCRIT % 37 4   PLATELETS Thousands/uL 234   BANDS PCT % 4   LYMPHO PCT % 6*   MONO PCT % 4   EOS PCT % 0     Results from last 7 days Lab Units 10/23/19  1927   SODIUM mmol/L 133*   POTASSIUM mmol/L 4 1   CHLORIDE mmol/L 99*   CO2 mmol/L 23   BUN mg/dL 44*   CREATININE mg/dL 1 93*   ANION GAP mmol/L 11   CALCIUM mg/dL 8 6   ALBUMIN g/dL 3 1*   TOTAL BILIRUBIN mg/dL 0 90   ALK PHOS U/L 76   ALT U/L 14   AST U/L 21   GLUCOSE RANDOM mg/dL 119                 Results from last 7 days   Lab Units 10/23/19  2011   LACTIC ACID mmol/L 1 5       Imaging: I have personally reviewed pertinent reports  XR chest 2 views   ED Interpretation by Jonathan Reece DO (10/23 1949)   Left upper lobe pneumonia  Final Result by Ana M Vitale MD (10/23 2119)      Lingular opacity, possibly pneumonia versus atelectasis or aspiration  Workstation performed: [id number]             EKG, Pathology, and Other Studies Reviewed on Admission:   EKG:  Normal sinus rhythm with incomplete right bundle branch block no acute ischemia  Allscripts / Epic Records Reviewed: Yes     ** Please Note: This note has been constructed using a voice recognition system   **

## 2019-10-24 NOTE — PHYSICAL THERAPY NOTE
PHYSICAL THERAPY TREATMENT NOTE    Patient Name: Denise Sheppard  ENHRJ'H Date: 10/24/2019     10/24/19 9455   Pain Assessment   Pain Assessment 0-10   Pain Score 3   Pain Location Back   Pain Orientation Lower   Restrictions/Precautions   Other Precautions Chair Alarm; Bed Alarm; Fall Risk;Telemetry;O2;Pain;Multiple lines;Hard of hearing   General   Chart Reviewed Yes   Family/Caregiver Present No   Cognition   Arousal/Participation Cooperative   Attention Attends with cues to redirect   Orientation Level Oriented to person;Oriented to place; Other (Comment)  (pt was identified w/ full name, birth date)   Following Commands Follows one step commands with increased time or repetition   Subjective   Subjective pt agreed to PT intervention  mobility education was provided to pt regarding technique for transfers and roller walker use   education was completed via demonstration, teachback, and verbal instruction  Transfers   Sit to Stand 6  Modified independent   Additional items Increased time required;Verbal cues  (for hand placement, LE positioning)   Stand to Sit 6  Modified independent   Additional items Verbal cues  (for hand placement, controlled descent)   Additional Comments 4 Item DGI: 6/12   Ambulation/Elevation   Gait pattern Decreased foot clearance; Short stride   Gait Assistance 6  Modified independent   Additional items Verbal cues; Tactile cues  (for walker positioning, full step length, breathing techniqu)   Assistive Device Rolling walker   Distance 180, 200 feet w/ seated rest break x 2 minutes  (additional not possible due to fatigue and pain)   Balance   Static Sitting Fair +   Dynamic Sitting Fair   Static Standing Fair   Ambulatory Fair  (w/ roller walker)   Activity Tolerance   Activity Tolerance Patient limited by fatigue;Patient limited by pain   Nurse Made Aware spoke to Jono Rojo CM`   Assessment Prognosis Fair   Problem List Decreased strength;Decreased endurance; Impaired balance;Decreased mobility; Decreased coordination;Decreased safety awareness; Impaired hearing;Pain   Assessment Therapist provided education to pt for mobility technique including transfers and roller walker use  Education was provided due to findings from evaluation  Frequent repetition was needed for carryover to be noted  Pt was found to have improvement after education w/ decreased level of assist to maintain safety and additional ambulation  Pt continues to be a fall risk per result of 4 Item DGI  continued inpatient PT tx is indicated to reduce fall risk factors and progress mobility training as appropriate  Goals   Patient Goals go home   STG Expiration Date 11/03/19   Short Term Goal #1 pt will: Increase bilateral LE strength 1/2 grade to facilitate independent mobility, Perform all transfers independently to improve independence, Ambulate 400 ft  with least restrictive assistive device independently w/o LOB, Increase all balance 1/2 grade to decrease risk for falls, Complete exercise program independently, Tolerate 3 hr OOB to faciliate upright tolerance and Improve Barthel Index score to 90 or greater to facilitate independence   PT Treatment Day 1   Plan   Treatment/Interventions Functional transfer training;LE strengthening/ROM; Therapeutic exercise; Endurance training;Cognitive reorientation;Patient/family training;Equipment eval/education;Gait training   Progress Progressing toward goals   PT Frequency 5x/wk   Recommendation   Recommendation OT consult; Other (Comment)  (PT at [facility])   Equipment Recommended Other (Comment)  (roller walker)     4 Item Dynamic Gait Index  2/3 Gait level surface  2/3 Change in gait speed  1/3 Gait with horizontal head turns  1/3 Gait with vertical head turns  6/12 total score (<10/12 indicates increased risk of fall)    Skilled inpatient PT recommended while in hospital to progress pt toward treatment goals      Hernan Speaks, PT

## 2019-10-24 NOTE — ASSESSMENT & PLAN NOTE
POA- left lingular infiltrate on chest x-ray with leukocytosis and hypoxia  Low risk for multi-drug resistant, treat with Rocephin and Zithromax  Resides in a skilled nursing facility  Check sputum culture a urinary antigens for Legionella and strep pneumonia  Blood cultures are pending, trend PCT  Continue supplemental oxygen as needed  Continue gentle hydration  No lactic acidosis

## 2019-10-24 NOTE — PLAN OF CARE
Problem: PHYSICAL THERAPY ADULT  Goal: Performs mobility at highest level of function for planned discharge setting  See evaluation for individualized goals  Description  Treatment/Interventions: Functional transfer training, LE strengthening/ROM, Therapeutic exercise, Endurance training, Cognitive reorientation, Patient/family training, Equipment eval/education, Gait training  Equipment Recommended: Other (Comment)(roller walker)       See flowsheet documentation for full assessment, interventions and recommendations  Outcome: Progressing  Note:   Prognosis: Fair  Problem List: Decreased strength, Decreased endurance, Impaired balance, Decreased mobility, Decreased coordination, Decreased safety awareness, Impaired hearing, Pain  Assessment: Pt presents with syncopal event in dining room at [facility] during dinner this evening  Dx: HCAP, sepsis, and syncope  order placed for PT eval and tx, w/ activity order of up w/ A  pt presents w/ comorbidities of arthritis, hyperlipidemia, HTN, and CVA and personal factors of advanced age, mobilizing w/ assistive device and hearing impairments  pt presents w/ pain, weakness, decreased endurance, impaired balance, gait deviations, altered sensation, impaired hearing, impaired coordination, decreased safety awareness and fall risk  these impairments are evident in findings from physical examination (weakness, altered sensation and impaired coordination), mobility assessment (need for standby to min assist w/ all phases of mobility when usually mobilizing independently, tolerance to only 140 feet of ambulation and need for cueing for mobility and breathing technique), and Barthel Index: 70/100  pt needed input for task focus and mobility technique/safety  pt is at risk for falls due to physical and safety awareness deficits   pt's clinical presentation is unstable/unpredictable (evident in need for assist w/ all phases of mobility when usually mobilizing independently, tolerance to only 140 feet of ambulation, pain impacting overall mobility status, need for supplemental oxygen in order to maintain oxygen saturation and need for input for task focus and mobility technique/safety)  pt needs inpatient PT tx to improve mobility deficits  discharge recommendation is for PT and [facility] to reduce fall risk and maximize level of functional independence  Pt would benefit from OT consult to address safety awareness  Recommendation: OT consult, Other (Comment)(PT at [facility])          See flowsheet documentation for full assessment

## 2019-10-24 NOTE — ASSESSMENT & PLAN NOTE
POA- left lingular infiltrate on chest x-ray with leukocytosis  Low risk for multi-drug resistant, treat with Rocephin and Zithromax  Resides in a skilled nursing facility  Check sputum culture a urinary antigens for Legionella and strep pneumonia  Blood cultures are pending, trend PCT  Continue supplemental oxygen as needed  Continue gentle hydration  Follow up cultures and procal

## 2019-10-24 NOTE — PLAN OF CARE
Problem: PHYSICAL THERAPY ADULT  Goal: Performs mobility at highest level of function for planned discharge setting  See evaluation for individualized goals  Description  Treatment/Interventions: Functional transfer training, LE strengthening/ROM, Therapeutic exercise, Endurance training, Cognitive reorientation, Patient/family training, Equipment eval/education, Gait training  Equipment Recommended: Other (Comment)(roller walker)       See flowsheet documentation for full assessment, interventions and recommendations  10/24/2019 0950 by Mesha Ferro PT  Outcome: Progressing  Note:   Prognosis: Fair  Problem List: Decreased strength, Decreased endurance, Impaired balance, Decreased mobility, Decreased coordination, Decreased safety awareness, Impaired hearing, Pain  Assessment: Therapist provided education to pt for mobility technique including transfers and roller walker use  Education was provided due to findings from evaluation  Frequent repetition was needed for carryover to be noted  Pt was found to have improvement after education w/ decreased level of assist to maintain safety and additional ambulation  Pt continues to be a fall risk per result of 4 Item DGI  continued inpatient PT tx is indicated to reduce fall risk factors and progress mobility training as appropriate  Recommendation: OT consult, Other (Comment)(PT at [facility])          See flowsheet documentation for full assessment  10/24/2019 0940 by Mesha Ferro, PT  Outcome: Progressing  Note:   Prognosis: Fair  Problem List: Decreased strength, Decreased endurance, Impaired balance, Decreased mobility, Decreased coordination, Decreased safety awareness, Impaired hearing, Pain  Assessment: Pt presents with syncopal event in dining room at [facility] during dinner this evening  Dx: HCAP, sepsis, and syncope   order placed for PT eval and tx, w/ activity order of up w/ A  pt presents w/ comorbidities of arthritis, hyperlipidemia, HTN, and CVA and personal factors of advanced age, mobilizing w/ assistive device and hearing impairments  pt presents w/ pain, weakness, decreased endurance, impaired balance, gait deviations, altered sensation, impaired hearing, impaired coordination, decreased safety awareness and fall risk  these impairments are evident in findings from physical examination (weakness, altered sensation and impaired coordination), mobility assessment (need for standby to min assist w/ all phases of mobility when usually mobilizing independently, tolerance to only 140 feet of ambulation and need for cueing for mobility and breathing technique), and Barthel Index: 70/100  pt needed input for task focus and mobility technique/safety  pt is at risk for falls due to physical and safety awareness deficits  pt's clinical presentation is unstable/unpredictable (evident in need for assist w/ all phases of mobility when usually mobilizing independently, tolerance to only 140 feet of ambulation, pain impacting overall mobility status, need for supplemental oxygen in order to maintain oxygen saturation and need for input for task focus and mobility technique/safety)  pt needs inpatient PT tx to improve mobility deficits  discharge recommendation is for PT and [facility] to reduce fall risk and maximize level of functional independence  Pt would benefit from OT consult to address safety awareness  Recommendation: OT consult, Other (Comment)(PT at [facility])          See flowsheet documentation for full assessment

## 2019-10-24 NOTE — ASSESSMENT & PLAN NOTE
· POA- left lingular infiltrate on chest x-ray with leukocytosis  · Low risk for multi-drug resistant, treat with Rocephin and Zithromax  · Resides in a skilled nursing facility  · Check sputum culture a urinary antigens for Legionella and strep pneumonia  · Blood cultures are pending, trend PCT  · Continue supplemental oxygen as needed  · Continue gentle hydration

## 2019-10-25 VITALS
HEIGHT: 67 IN | TEMPERATURE: 97.5 F | DIASTOLIC BLOOD PRESSURE: 72 MMHG | WEIGHT: 149.69 LBS | HEART RATE: 80 BPM | SYSTOLIC BLOOD PRESSURE: 155 MMHG | BODY MASS INDEX: 23.49 KG/M2 | RESPIRATION RATE: 16 BRPM | OXYGEN SATURATION: 97 %

## 2019-10-25 PROCEDURE — 99239 HOSP IP/OBS DSCHRG MGMT >30: CPT | Performed by: PHYSICIAN ASSISTANT

## 2019-10-25 RX ORDER — ACETAMINOPHEN 325 MG/1
650 TABLET ORAL EVERY 6 HOURS PRN
Status: DISCONTINUED | OUTPATIENT
Start: 2019-10-25 | End: 2019-10-25 | Stop reason: HOSPADM

## 2019-10-25 RX ORDER — CEFDINIR 300 MG/1
300 CAPSULE ORAL EVERY 24 HOURS
Qty: 4 CAPSULE | Refills: 0 | Status: SHIPPED | OUTPATIENT
Start: 2019-10-26 | End: 2019-10-25 | Stop reason: SDUPTHER

## 2019-10-25 RX ORDER — CEFDINIR 300 MG/1
300 CAPSULE ORAL EVERY 24 HOURS
Qty: 4 CAPSULE | Refills: 0 | Status: SHIPPED | OUTPATIENT
Start: 2019-10-26 | End: 2019-10-30

## 2019-10-25 RX ORDER — CEFDINIR 300 MG/1
300 CAPSULE ORAL ONCE
Status: COMPLETED | OUTPATIENT
Start: 2019-10-25 | End: 2019-10-25

## 2019-10-25 RX ORDER — AZITHROMYCIN 250 MG/1
500 TABLET, FILM COATED ORAL ONCE
Status: COMPLETED | OUTPATIENT
Start: 2019-10-25 | End: 2019-10-25

## 2019-10-25 RX ADMIN — AZITHROMYCIN 500 MG: 250 TABLET, FILM COATED ORAL at 12:02

## 2019-10-25 RX ADMIN — CEFDINIR 300 MG: 300 CAPSULE ORAL at 12:02

## 2019-10-25 RX ADMIN — ACETAMINOPHEN 650 MG: 325 TABLET, FILM COATED ORAL at 01:57

## 2019-10-25 RX ADMIN — ASPIRIN 81 MG: 81 TABLET, COATED ORAL at 10:13

## 2019-10-25 RX ADMIN — ENOXAPARIN SODIUM 30 MG: 30 INJECTION SUBCUTANEOUS at 10:13

## 2019-10-25 RX ADMIN — PRAVASTATIN SODIUM 40 MG: 40 TABLET ORAL at 10:13

## 2019-10-25 RX ADMIN — PANTOPRAZOLE SODIUM 40 MG: 40 TABLET, DELAYED RELEASE ORAL at 05:05

## 2019-10-25 RX ADMIN — LISINOPRIL 5 MG: 5 TABLET ORAL at 10:13

## 2019-10-25 RX ADMIN — AMLODIPINE BESYLATE 10 MG: 10 TABLET ORAL at 10:13

## 2019-10-25 NOTE — DISCHARGE SUMMARY
Discharge- Domi Mantilla 2/16/1927, 80 y o  male MRN: [id number]    Unit/Bed#: -01 Encounter: [id number]    Primary Care Provider: Juan Foster MD   Date and time admitted to hospital: 10/23/2019  7:12 PM    * HCAP (healthcare-associated pneumonia)  Assessment & Plan  POA- left lingular infiltrate on chest x-ray with leukocytosis  Low risk for multi-drug resistant, treat with Rocephin and Zithromax day #3, convert to po cefdinir x 4 days  Resides in a skilled nursing facility therefore HCAP/possible GNR PNA  urinary antigens for Legionella and strep pneumonia negative  Blood cultures are negative  O2 sat 95% room air, can d/c O2      Sepsis ([address] )  Assessment & Plan  Based on leukocytosis /hypoxia and source being PNA      Syncope  Assessment & Plan  Syncopal event while at skilled nursing facility in dining room  Patient does not recall the event  Continue to monitor on telemetry during hospitalization to rule out arrhythmias  EKG and troponin in the emergency department did not reveal ischemia  Suspect this was secondary to infectious process/dehydration  No events on telemetry  CKD (chronic kidney disease)  Assessment & Plan  Creatinine baseline 1 5-2 0, now 1 99  Hold HCTZ    Essential hypertension  Assessment & Plan  Continue home regimen, avoid hypotension  Discharging Physician / Practitioner: Krish Hunter PA-C  PCP: Juan Foster MD  Admission Date:   Admission Orders (From admission, onward)     Ordered        10/23/19 2017  Inpatient Admission  Once                   Discharge Date: 10/25/19    Resolved Problems  Date Reviewed: 10/25/2019    None          Consultations During Hospital Stay:  · none    Procedures Performed:   · CXR= Lingular opacity, possibly pneumonia versus atelectasis or aspiration      Significant Findings / Test Results:   · As above    Incidental Findings:   · none    Test Results Pending at Discharge (will require follow up):   · none     Outpatient Tests Requested:  · CXR 4 weeks    Complications:  none    Reason for Admission:  Syncopal event    Hospital Course:     Domi Mantilla is a 80 y o  male patient who originally presented to the hospital on 10/23/2019 due to syncopal event that occurred in the dining room at his assisted living facility  Patient apparently had been coughing over the past day and had recently been in his family doctor's office to get his flu shot  After the syncopal event he vomited 1 time and was brought into the emergency department  He was found to have a white count of [address] and a lingular infiltrate on chest x-ray  He was treated for pneumonia with ceftriaxone and Zithromax and responded quite well  He was also monitored on telemetry and EKG was done which was unrevealing  He had no additional events of syncope  He does admit to some lightheadedness when he stands up too abruptly and I suggested discontinuing his HCTZ to avoid hypotension and volume depletion  I advised him to change positions slowly and he was recommended to have PT at his facility  He will complete 4 more days of cefdinir and should follow up with his family doctor for repeat chest x-ray in 4 weeks  Please see above list of diagnoses and related plan for additional information  Condition at Discharge: stable     Discharge Day Visit / Exam:     Subjective:  Patient denies any chest pain, cough, fever, chills, shortness of breath  He says he feels well and wants to go home today  He is not reporting any significant dizziness though he says when he gets up too quickly he is a little lightheaded briefly    Vitals: Blood Pressure: 155/72 (10/25/19 0700)  Pulse: 80 (10/25/19 0700)  Temperature: 97 5 °F (36 4 °C) (10/25/19 0700)  Temp Source: Oral (10/25/19 0700)  Respirations: 16 (10/25/19 0700)  Height: 5' 7" (170 2 cm) (10/23/19 2120)  Weight - Scale: 67 9 kg (149 lb 11 1 oz) (10/23/19 2120)  SpO2: 97 % (10/25/19 0700)  Exam:   Physical Exam Constitutional: He appears well-developed and well-nourished  No distress  Appears much younger than stated age   HENT:   Head: Normocephalic and atraumatic  Eyes: Conjunctivae are normal  Right eye exhibits no discharge  Left eye exhibits no discharge  No scleral icterus  Cardiovascular: Normal rate, regular rhythm and normal heart sounds  No murmur heard  Pulmonary/Chest: Effort normal and breath sounds normal  No stridor  No respiratory distress  He has no wheezes  O2 sat 95% on room air as assessed by me  No cough or wheezing  Abdominal: Soft  He exhibits no distension  There is no guarding  Musculoskeletal: He exhibits no edema  Neurological: He is alert  Awake alert interactive he is a good historian  No evidence of confusion  Quite independent and able to sit up without any difficulty  Skin: Skin is warm and dry  No rash noted  He is not diaphoretic  No erythema  No pallor  Vitals reviewed  Discussion with Family: daughter over phone    Discharge instructions/Information to patient and family:   See after visit summary for information provided to patient and family  Provisions for Follow-Up Care:  See after visit summary for information related to follow-up care and any pertinent home health orders  Disposition:   [facility] indep  living    Planned Readmission: none     Discharge Statement:  I spent 40 minutes discharging the patient  This time was spent on the day of discharge  I had direct contact with the patient on the day of discharge  Greater than 50% of the total time was spent examining patient, answering all patient questions, arranging and discussing plan of care with patient as well as directly providing post-discharge instructions  Additional time then spent on discharge activities  Discharge Medications:  See after visit summary for reconciled discharge medications provided to patient and family        ** Please Note: This note has been constructed using a voice recognition system **

## 2019-10-25 NOTE — ASSESSMENT & PLAN NOTE
POA- left lingular infiltrate on chest x-ray with leukocytosis  Low risk for multi-drug resistant, treat with Rocephin and Zithromax day #3, convert to po cefdinir x 4 days  Resides in a skilled nursing facility therefore HCAP/possible GNR PNA  urinary antigens for Legionella and strep pneumonia negative  Blood cultures are negative  O2 sat 95% room air, can d/c O2

## 2019-10-25 NOTE — SOCIAL WORK
LOS 2  Patient is not a bundle  Patient is not a readmission  Patient lives at [address]  Patient lives alone in his apartment  Patient has a RW and Home O2 through [address] [facility]  PT recommending additional physical therapy at home  CM spoke with Marva CR at [address]  Patient is able to get Physical therapy at [facility] he just needs a script for evaluation at treat  CM updated [facility] provider  Nruys Cooper will update independent living that patient will be returning home today  Patient's daughter is here and will transport him home  CM reviewed the availability of treatment team to discuss questions or concerns patient and/or family may have regarding  understanding medications and recognizing signs and symptoms once discharged  CM also encouraged patient to follow up with all recommended appointments after discharge  Patient advised of importance for patient and family to participate in managing patients medical well being

## 2019-10-25 NOTE — ASSESSMENT & PLAN NOTE
Syncopal event while at skilled nursing facility in dining room  Patient does not recall the event  Continue to monitor on telemetry during hospitalization to rule out arrhythmias  EKG and troponin in the emergency department did not reveal ischemia  Suspect this was secondary to infectious process/dehydration  No events on telemetry

## 2019-10-28 LAB
BACTERIA BLD CULT: NORMAL
BACTERIA BLD CULT: NORMAL

## 2019-11-05 NOTE — ED NOTES
Patient transported to Jeannine Boykin RN  06/25/18 2100
Phillip Berry RN  06/25/18 1666
Pt aware urine sample is needed     Cherie Russo RN  06/25/18 0020
Anemia    Angina pectoris    Depression    HTN (hypertension)    TIA (transient ischemic attack)

## 2020-01-13 ENCOUNTER — OFFICE VISIT (OUTPATIENT)
Dept: FAMILY MEDICINE CLINIC | Facility: CLINIC | Age: 85
End: 2020-01-13
Payer: COMMERCIAL

## 2020-01-13 VITALS
RESPIRATION RATE: 16 BRPM | TEMPERATURE: 97.5 F | HEART RATE: 76 BPM | HEIGHT: 66 IN | WEIGHT: 146 LBS | SYSTOLIC BLOOD PRESSURE: 132 MMHG | BODY MASS INDEX: 23.46 KG/M2 | DIASTOLIC BLOOD PRESSURE: 80 MMHG

## 2020-01-13 DIAGNOSIS — R20.2 PARESTHESIAS: ICD-10-CM

## 2020-01-13 DIAGNOSIS — I63.9 OCCIPITAL INFARCTION (HCC): ICD-10-CM

## 2020-01-13 DIAGNOSIS — I65.21 STENOSIS OF RIGHT CAROTID ARTERY: ICD-10-CM

## 2020-01-13 DIAGNOSIS — G56.03 BILATERAL CARPAL TUNNEL SYNDROME: ICD-10-CM

## 2020-01-13 DIAGNOSIS — I10 ESSENTIAL HYPERTENSION: Primary | Chronic | ICD-10-CM

## 2020-01-13 DIAGNOSIS — N18.30 STAGE 3 CHRONIC KIDNEY DISEASE (HCC): ICD-10-CM

## 2020-01-13 DIAGNOSIS — I63.9 BASAL GANGLIA INFARCTION (HCC): ICD-10-CM

## 2020-01-13 DIAGNOSIS — E78.00 PURE HYPERCHOLESTEROLEMIA: Chronic | ICD-10-CM

## 2020-01-13 PROBLEM — R65.20 SEVERE SEPSIS (HCC): Status: RESOLVED | Noted: 2017-06-20 | Resolved: 2020-01-13

## 2020-01-13 PROBLEM — R55 SYNCOPE: Status: RESOLVED | Noted: 2019-10-23 | Resolved: 2020-01-13

## 2020-01-13 PROBLEM — J18.9 HCAP (HEALTHCARE-ASSOCIATED PNEUMONIA): Status: RESOLVED | Noted: 2019-10-24 | Resolved: 2020-01-13

## 2020-01-13 PROBLEM — A41.9 SEVERE SEPSIS (HCC): Status: RESOLVED | Noted: 2017-06-20 | Resolved: 2020-01-13

## 2020-01-13 PROBLEM — R55 NEAR SYNCOPE: Status: RESOLVED | Noted: 2018-06-26 | Resolved: 2020-01-13

## 2020-01-13 PROBLEM — A41.9 SEPSIS (HCC): Status: RESOLVED | Noted: 2019-10-23 | Resolved: 2020-01-13

## 2020-01-13 PROCEDURE — 1160F RVW MEDS BY RX/DR IN RCRD: CPT | Performed by: FAMILY MEDICINE

## 2020-01-13 PROCEDURE — 99203 OFFICE O/P NEW LOW 30 MIN: CPT | Performed by: FAMILY MEDICINE

## 2020-01-13 PROCEDURE — 3288F FALL RISK ASSESSMENT DOCD: CPT | Performed by: FAMILY MEDICINE

## 2020-01-13 PROCEDURE — 3725F SCREEN DEPRESSION PERFORMED: CPT | Performed by: FAMILY MEDICINE

## 2020-01-13 PROCEDURE — 1101F PT FALLS ASSESS-DOCD LE1/YR: CPT | Performed by: FAMILY MEDICINE

## 2020-01-13 RX ORDER — AMOXICILLIN 500 MG
1 CAPSULE ORAL DAILY
COMMUNITY
End: 2021-03-06 | Stop reason: ALTCHOICE

## 2020-01-13 RX ORDER — ACETAMINOPHEN 500 MG
500 TABLET ORAL EVERY 6 HOURS PRN
COMMUNITY
End: 2021-01-10

## 2020-01-13 NOTE — PROGRESS NOTES
Assessment/Plan:       Diagnoses and all orders for this visit:    Essential hypertension  -     CBC and differential  -     Comprehensive metabolic panel    Stage 3 chronic kidney disease (HCC)    Pure hypercholesterolemia  -     Lipid panel  -     TSH, 3rd generation with Free T4 reflex    Stenosis of right carotid artery    Paresthesias  -     Vitamin B12  -     PTH, intact  -     Vitamin D 25 hydroxy  -     Phosphorus  -     Magnesium  -     Protein / creatinine ratio, urine    Occipital infarction (HCC)    Basal ganglia infarction (HCC)    Bilateral carpal tunnel syndrome    Other orders  -     Omega-3 Fatty Acids (FISH OIL) 1200 MG CAPS; Take 1 capsule by mouth daily  -     acetaminophen (TYLENOL) 500 mg tablet; Take 500 mg by mouth every 6 (six) hours as needed for mild pain  -     Multiple Vitamins-Minerals (PRESERVISION AREDS 2 PO); Take 1 tablet by mouth daily          Continue with current medications  repeat labs  Suspected carpal tunnel syndrome I discussed EMGs he declined for now  Check B12 level  Trial of wrist splints at HS  Prn ES Tylenol for pain  No NSAIDs  Stay hydrated  OV 4 months  Patient ID: Odilia Richards is a 80 y o  male  First office visit for this 80year old male  Here with his daughter  Patient resides at [facility] independent living  Independent with ADLs and IADLs  Still driving on a limited basis  Hypertension and CKD  Blood pressures have been stable on Lisinopril 5 mg daily and Amlodipine 10 mg daily  10/2019 creatinine 1 83  GFR 31   12/2018 creatinine 1 70  Hyperlipidemia on Pravastatin 40 mg alternating with 20 mg daily 10/2019 lipid profile cholesterol 160  Triglycerides 68  HDL 55  LDL 91   LFTs normal   TSH 1 85       The following portions of the patient's history were reviewed and updated as appropriate: allergies, current medications, past family history, past medical history, past social history, past surgical history and problem list     Review of Systems   Constitutional: Negative for appetite change, chills, fever and unexpected weight change  HENT: Positive for ear pain (bilateral hearing aids  )  Negative for congestion, rhinorrhea, sore throat and trouble swallowing  Eyes: Negative for visual disturbance  History of ARMD   Respiratory: Negative for cough, shortness of breath and wheezing  Status post admission 10/2019 for pneumonia and syncope  HANNA on CKD  Discharge creatinine 1 99  Cardiovascular: Negative for chest pain, palpitations and leg swelling  Carotid artery disease status post right CEA   06/2019 admission for near syncopal episode   Gastrointestinal: Negative for abdominal pain, blood in stool, constipation, diarrhea, nausea and vomiting  GERD stable on Omeprazole 20 mg daily  No reflux  No dysphagia   Endocrine: Negative for polydipsia and polyuria  Genitourinary: Negative for difficulty urinating  Musculoskeletal: Positive for back pain and gait problem (ambulates with a walker  )  Negative for arthralgias and myalgias  Chronic right lower back pain  No radicular pain intermittent pain in left knee/left lower thigh  He completed PT  Skin: Negative for rash  Allergic/Immunologic: Negative for environmental allergies  Neurological: Positive for numbness (Intermittent numbness of both hands  )  Negative for dizziness and headaches  ER visit 04/2019 for syncopal episode  CT scan head chronic right occipital lobe infarction with encephalomalacia and gliosis  Chronic lacunar infarctions in the basal ganglia bilaterally and central loco  Cerebral atrophy with chronic small-vessel ischemic white matter disease  Hematological: Negative for adenopathy  Does not bruise/bleed easily  Psychiatric/Behavioral: Negative for dysphoric mood and sleep disturbance           Objective:      /80   Pulse 76   Temp 97 5 °F (36 4 °C)   Resp 16   Ht 5' 6 3" (1 684 m) Wt 66 2 kg (146 lb)   BMI 23 35 kg/m²     Wt Readings from Last 3 Encounters:   01/13/20 66 2 kg (146 lb)   10/23/19 67 9 kg (149 lb 11 1 oz)   04/20/19 65 6 kg (144 lb 10 oz)        Physical Exam   Constitutional: He is oriented to person, place, and time  He appears well-developed and well-nourished  No distress  HENT:   Right Ear: Tympanic membrane normal    Left Ear: Tympanic membrane normal    Mouth/Throat: Oropharynx is clear and moist    Eyes: Pupils are equal, round, and reactive to light  Conjunctivae and EOM are normal  No scleral icterus  Neck: No JVD present  Carotid bruit is not present  No tracheal deviation present  No thyroid mass and no thyromegaly present  Cardiovascular: Normal rate, regular rhythm and normal heart sounds  Exam reveals no gallop  No murmur heard  Pulses:       Carotid pulses are 2+ on the right side, and 2+ on the left side  Pulmonary/Chest: Effort normal and breath sounds normal  No respiratory distress  He has no wheezes  He has no rales  Abdominal: Soft  Bowel sounds are normal  He exhibits no distension, no abdominal bruit and no mass  There is no hepatosplenomegaly  There is no tenderness  There is no rebound and no guarding  Musculoskeletal: He exhibits deformity  He exhibits no edema or tenderness  Bilateral knee varus deformities with bilateral knee crepitus  Full range of motion  No joint line tenderness or effusion  No tenderness palpating lumbar spine, lumbar paraspinal muscles or SI areas   Lymphadenopathy:     He has no cervical adenopathy  Neurological: He is alert and oriented to person, place, and time  He has normal strength  No cranial nerve deficit  Gait normal    Hand  normal   Mild atrophy of thenar eminences bilaterally   Skin: No rash noted  No cyanosis  Nails show no clubbing  Psychiatric: He has a normal mood and affect  Cognition and memory are normal    Nursing note and vitals reviewed        Creatinine (mg/dL)   Date Value 10/24/2019 1 99 (H)   10/23/2019 1 93 (H)   04/20/2019 2 02 (H)   03/27/2015 1 54 (H)   03/19/2015 1 59 (H)   03/18/2015 2 03 (H)     Lab Results   Component Value Date     03/27/2015    K 4 2 10/24/2019     10/24/2019    CO2 23 10/24/2019       Hemoglobin (g/dL)   Date Value   10/24/2019 11 7 (L)   10/23/2019 12 4   04/20/2019 13 4   03/18/2015 13 3   03/17/2015 11 3 (L)   03/16/2015 13 0     Procedure: Xr Chest 2 Views    Result Date: 10/23/2019  Narrative: CHEST INDICATION:   Chest Pain  Syncope  Vomiting  COMPARISON:  6/25/2018 EXAM PERFORMED/VIEWS:  XR CHEST PA & LATERAL FINDINGS: Heart shadow appears unremarkable  Atherosclerotic vascular calcifications are noted  Increased opacity in the left mid lung field laterally  Right lung clear  Age-appropriate degenerative changes are noted in the spine  Impression: Lingular opacity, possibly pneumonia versus atelectasis or aspiration   Workstation performed: [id number]

## 2020-01-14 PROBLEM — Z98.890 H/O CAROTID ENDARTERECTOMY: Status: ACTIVE | Noted: 2020-01-14

## 2020-01-16 LAB
CREAT ?TM UR-SCNC: 146 UMOL/L
EXT PROTEIN URINE: 112.1
PROT/CREAT UR: 0.77 MG/G{CREAT}

## 2020-01-23 ENCOUNTER — TELEPHONE (OUTPATIENT)
Dept: FAMILY MEDICINE CLINIC | Facility: CLINIC | Age: 85
End: 2020-01-23

## 2020-01-23 NOTE — TELEPHONE ENCOUNTER
Patient's lab results from [address] [place] [address] are resulted & in [address]  Please review & advise patient of results      Esther Comment [phone]

## 2020-01-24 DIAGNOSIS — N18.30 STAGE 3 CHRONIC KIDNEY DISEASE (HCC): Primary | ICD-10-CM

## 2020-01-24 NOTE — TELEPHONE ENCOUNTER
Call patient re labs  Creatinine or kidney elevated at 1 91  Normal 0 6 to 1 30  Creatinine 1 83 October  Stay hydrated  Avoid NSAIDs  Repeat BMP 6 to 8 weeks  Vitamin D high  On 5,000 IU daily  Decrease dose to 5,000 IU M-W-F   The rest of his labs are normal  Order for BMP placed

## 2020-01-24 NOTE — TELEPHONE ENCOUNTER
Patient called back but was having a hard time hearing me   His daughter will call back on Monday am

## 2020-01-27 NOTE — TELEPHONE ENCOUNTER
Patient's daughter called back & notified of message  Faxed BMP labs to S per request  Daughter wants to let you know that patient will start CoQ10 today   FYI

## 2020-02-03 ENCOUNTER — TELEPHONE (OUTPATIENT)
Dept: FAMILY MEDICINE CLINIC | Facility: CLINIC | Age: 85
End: 2020-02-03

## 2020-02-03 DIAGNOSIS — F09 COGNITIVE DISORDER: Primary | ICD-10-CM
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
## 2020-03-10 PROBLEM — N18.4 STAGE 4 CHRONIC KIDNEY DISEASE (HCC): Status: ACTIVE | Noted: 2019-10-24

## 2020-03-27 ENCOUNTER — TELEPHONE (OUTPATIENT)
Dept: FAMILY MEDICINE CLINIC | Facility: CLINIC | Age: 85
End: 2020-03-27

## 2020-03-27 DIAGNOSIS — N18.30 STAGE 3 CHRONIC KIDNEY DISEASE (HCC): ICD-10-CM

## 2020-03-27 DIAGNOSIS — R79.89 HIGH SERUM VITAMIN D: Primary | ICD-10-CM

## 2020-03-27 NOTE — TELEPHONE ENCOUNTER
Call patient regarding labs  Repeat creatinine-kidney function 1 86 improved from 1 91  Normal range 0 6 to 1 3  Continue with current medications  Stay hydrated  Avoid medications such as Advil or Aleve  Repeat labs prior to his next visit 05/2020    Orders placed

## 2020-04-06 DIAGNOSIS — I10 ESSENTIAL HYPERTENSION: Primary | Chronic | ICD-10-CM

## 2020-04-06 RX ORDER — AMLODIPINE BESYLATE 10 MG/1
10 TABLET ORAL DAILY
Qty: 90 TABLET | Refills: 3 | Status: SHIPPED | OUTPATIENT
Start: 2020-04-06 | End: 2020-10-14 | Stop reason: SDUPTHER

## 2020-04-22 DIAGNOSIS — K21.9 GASTROESOPHAGEAL REFLUX DISEASE, ESOPHAGITIS PRESENCE NOT SPECIFIED: Primary | ICD-10-CM

## 2020-04-22 RX ORDER — OMEPRAZOLE 20 MG/1
20 CAPSULE, DELAYED RELEASE ORAL DAILY
Qty: 90 CAPSULE | Refills: 3 | Status: SHIPPED | OUTPATIENT
Start: 2020-04-22

## 2020-07-02 ENCOUNTER — OFFICE VISIT (OUTPATIENT)
Dept: FAMILY MEDICINE CLINIC | Facility: CLINIC | Age: 85
End: 2020-07-02
Payer: COMMERCIAL

## 2020-07-02 VITALS
BODY MASS INDEX: 22.5 KG/M2 | TEMPERATURE: 97.4 F | SYSTOLIC BLOOD PRESSURE: 130 MMHG | HEART RATE: 78 BPM | WEIGHT: 140 LBS | DIASTOLIC BLOOD PRESSURE: 60 MMHG | RESPIRATION RATE: 16 BRPM | HEIGHT: 66 IN | OXYGEN SATURATION: 96 %

## 2020-07-02 DIAGNOSIS — E78.00 PURE HYPERCHOLESTEROLEMIA: ICD-10-CM

## 2020-07-02 DIAGNOSIS — N18.30 STAGE 3 CHRONIC KIDNEY DISEASE (HCC): ICD-10-CM

## 2020-07-02 DIAGNOSIS — Z98.890 H/O CAROTID ENDARTERECTOMY: ICD-10-CM

## 2020-07-02 DIAGNOSIS — I10 ESSENTIAL HYPERTENSION: Primary | ICD-10-CM

## 2020-07-02 DIAGNOSIS — I65.21 STENOSIS OF RIGHT CAROTID ARTERY: ICD-10-CM

## 2020-07-02 DIAGNOSIS — K21.9 GASTROESOPHAGEAL REFLUX DISEASE WITHOUT ESOPHAGITIS: ICD-10-CM

## 2020-07-02 DIAGNOSIS — H81.10 BENIGN PAROXYSMAL POSITIONAL VERTIGO, UNSPECIFIED LATERALITY: ICD-10-CM

## 2020-07-02 DIAGNOSIS — H61.21 IMPACTED CERUMEN, RIGHT EAR: ICD-10-CM

## 2020-07-02 PROCEDURE — 3008F BODY MASS INDEX DOCD: CPT | Performed by: FAMILY MEDICINE

## 2020-07-02 PROCEDURE — 3075F SYST BP GE 130 - 139MM HG: CPT | Performed by: FAMILY MEDICINE

## 2020-07-02 PROCEDURE — 1160F RVW MEDS BY RX/DR IN RCRD: CPT | Performed by: FAMILY MEDICINE

## 2020-07-02 PROCEDURE — 69210 REMOVE IMPACTED EAR WAX UNI: CPT | Performed by: FAMILY MEDICINE

## 2020-07-02 PROCEDURE — 99214 OFFICE O/P EST MOD 30 MIN: CPT | Performed by: FAMILY MEDICINE

## 2020-07-02 PROCEDURE — 4040F PNEUMOC VAC/ADMIN/RCVD: CPT | Performed by: FAMILY MEDICINE

## 2020-07-02 PROCEDURE — 1036F TOBACCO NON-USER: CPT | Performed by: FAMILY MEDICINE

## 2020-07-02 PROCEDURE — 3078F DIAST BP <80 MM HG: CPT | Performed by: FAMILY MEDICINE

## 2020-07-02 NOTE — PROGRESS NOTES
Assessment/Plan:         Diagnoses and all orders for this visit:    Essential hypertension  -     CBC and differential  -     Comprehensive metabolic panel    Stage 3 chronic kidney disease (HCC)  -     Protein / creatinine ratio, urine  -     Phosphorus  -     Magnesium  -     PTH, intact  -     Vitamin D 25 hydroxy    Pure hypercholesterolemia  -     TSH, 3rd generation with Free T4 reflex  -     Lipid panel    Benign paroxysmal positional vertigo, unspecified laterality    Impacted cerumen, right ear  -     Ear cerumen removal    Gastroesophageal reflux disease without esophagitis    Stenosis of right carotid artery    H/O carotid endarterectomy    Other orders  -     Discontinue: Omeprazole 20 MG TBDD; TAKE 1 CAPSULE BY MOUTH            Continue with current medications  Stay hydrated  Office visit in 4 months with repeat labs at that time  As a separate procedure today I performed cerumen removal right ear canal see procedure note  Script for vestibular therapy if symptoms continue  Advised to call if any changes     Patient ID: Myles Alexis is a 80 y o  male  Follow up visit  Here with his daughter  [employer] independent living resident  Independent with ADLs and IADLs  No longer driving  Hypertension and CKD  Blood pressures have been stable on Lisinopril 5 mg daily and Amlodipine 10 mg daily  06/2020 creatinine 1 90  GFR 30  Electrolytes normal except for bicarb 22  Hgb 13 6  Vitamin D 62  03/2020 creatinine 1 86 01/2020 creatinine 1 91  Mg++ 1 7  Phosphorus 3 1  Intact PTH 52 7  Urine protein creatinine ratio 0 77   10/2019 creatinine 1 83  GFR 31   12/2018 creatinine 1 70  Hyperlipidemia on Pravastatin 40 mg alternating with 20 mg daily 01/2020 lipid profile cholesterol 199  Triglycerides 137  HDL 55     06/2020 LFTs normal   01/2020 TSH 2 53       The following portions of the patient's history were reviewed and updated as appropriate: allergies, current medications, past family history, past medical history, past social history, past surgical history and problem list     Review of Systems   Constitutional: Positive for unexpected weight change (9 lb weight loss from 10/2019)  Negative for appetite change, chills and fever  HENT: Negative for congestion, ear pain, rhinorrhea, sore throat and trouble swallowing  Eyes: Negative for visual disturbance  History of ARMD   Respiratory: Negative for cough, shortness of breath and wheezing  Status post admission 10/2019 for pneumonia and syncope  HANNA on CKD  Discharge creatinine 1 99  Cardiovascular: Negative for chest pain, palpitations and leg swelling  Carotid artery disease status post right CEA   06/2019 admission for near syncopal episode   Gastrointestinal: Negative for abdominal pain, blood in stool, constipation, diarrhea, nausea and vomiting  GERD stable on Omeprazole 20 mg daily  No reflux  No dysphagia   Endocrine: Negative for polydipsia and polyuria  Genitourinary: Negative for difficulty urinating  Musculoskeletal: Positive for back pain and gait problem (ambulates with a walker  )  Negative for arthralgias and myalgias  Chronic right lower back pain  No radicular pain intermittent pain in left knee/left lower thigh  He completed PT  Skin: Negative for rash  Allergic/Immunologic: Negative for environmental allergies  Neurological: Positive for dizziness and numbness (Intermittent numbness of both hands  )  Negative for headaches  Recent episodes of position dizziness/vertigo  No nausea or vomiting  No headaches  No recent illnesses  ER visit 04/2019 for syncopal episode  CT scan head chronic right occipital lobe infarction with encephalomalacia and gliosis  Chronic lacunar infarctions in the basal ganglia bilaterally and central loco  Cerebral atrophy with chronic small-vessel ischemic white matter disease  Hematological: Negative for adenopathy   Does not bruise/bleed easily  Psychiatric/Behavioral: Negative for dysphoric mood and sleep disturbance  Objective:      /60   Pulse 78   Temp (!) 97 4 °F (36 3 °C)   Resp 16   Ht 5' 6 3" (1 684 m)   Wt 63 5 kg (140 lb)   SpO2 96%   BMI 22 39 kg/m²     BP Readings from Last 3 Encounters:   07/02/20 130/60   01/13/20 132/80   10/25/19 155/72       Wt Readings from Last 3 Encounters:   07/02/20 63 5 kg (140 lb)   01/13/20 66 2 kg (146 lb)   10/23/19 67 9 kg (149 lb 11 1 oz)          Physical Exam   Constitutional: He is oriented to person, place, and time  He appears well-developed and well-nourished  No distress  HENT:   Left Ear: Tympanic membrane normal    Impacted cerumen right ear   Eyes: Pupils are equal, round, and reactive to light  Conjunctivae and EOM are normal  No scleral icterus  Right eye exhibits no nystagmus  Left eye exhibits no nystagmus  Neck: No JVD present  Carotid bruit is not present  No tracheal deviation present  No thyroid mass and no thyromegaly present  Cardiovascular: Normal rate, regular rhythm and normal heart sounds  Exam reveals no gallop  No murmur heard  Pulses:       Carotid pulses are 2+ on the right side, and 2+ on the left side  Pulmonary/Chest: Effort normal and breath sounds normal  No respiratory distress  He has no wheezes  He has no rales  Abdominal: Soft  Bowel sounds are normal  He exhibits no distension, no abdominal bruit and no mass  There is no hepatosplenomegaly  There is no tenderness  There is no rebound and no guarding  Musculoskeletal: He exhibits no edema  Lymphadenopathy:     He has no cervical adenopathy  Neurological: He is alert and oriented to person, place, and time  No cranial nerve deficit  Gait normal    No focal neurologic deficits   Skin: No rash noted  No cyanosis  Nails show no clubbing  Psychiatric: He has a normal mood and affect  Nursing note and vitals reviewed          Ear cerumen removal  Date/Time: 7/2/2020 11:50 AM  Performed by: Quentin Ham MD  Authorized by: Quentin Ham MD     Patient location:  Clinic  Other Assisting Provider: Yes (comment) (MA)    Consent:     Consent obtained:  Verbal  Procedure details:     Location:  R ear    Procedure type: irrigation with instrumentation      Instrumentation: curette      Approach:  External  Post-procedure details:     Complication:  None    Hearing quality:  Improved    Patient tolerance of procedure:   Tolerated well, no immediate complications  Comments:      Postprocedure right ear canal and TM normal

## 2020-07-21 DIAGNOSIS — I10 ESSENTIAL HYPERTENSION: Chronic | ICD-10-CM

## 2020-07-21 RX ORDER — LISINOPRIL 5 MG/1
5 TABLET ORAL DAILY
Qty: 90 TABLET | Refills: 3 | Status: ON HOLD | OUTPATIENT
Start: 2020-07-21 | End: 2020-12-28

## 2020-08-20 ENCOUNTER — OFFICE VISIT (OUTPATIENT)
Dept: FAMILY MEDICINE CLINIC | Facility: CLINIC | Age: 85
End: 2020-08-20
Payer: COMMERCIAL

## 2020-08-20 VITALS
RESPIRATION RATE: 16 BRPM | WEIGHT: 135 LBS | TEMPERATURE: 97.5 F | HEART RATE: 68 BPM | BODY MASS INDEX: 21.59 KG/M2 | SYSTOLIC BLOOD PRESSURE: 128 MMHG | DIASTOLIC BLOOD PRESSURE: 70 MMHG

## 2020-08-20 DIAGNOSIS — L98.9 SKIN LESION OF LEFT LOWER LIMB: Primary | ICD-10-CM

## 2020-08-20 PROCEDURE — 3074F SYST BP LT 130 MM HG: CPT | Performed by: FAMILY MEDICINE

## 2020-08-20 PROCEDURE — 4040F PNEUMOC VAC/ADMIN/RCVD: CPT | Performed by: FAMILY MEDICINE

## 2020-08-20 PROCEDURE — 1160F RVW MEDS BY RX/DR IN RCRD: CPT | Performed by: FAMILY MEDICINE

## 2020-08-20 PROCEDURE — 99213 OFFICE O/P EST LOW 20 MIN: CPT | Performed by: FAMILY MEDICINE

## 2020-08-20 PROCEDURE — 1036F TOBACCO NON-USER: CPT | Performed by: FAMILY MEDICINE

## 2020-08-20 PROCEDURE — 3078F DIAST BP <80 MM HG: CPT | Performed by: FAMILY MEDICINE

## 2020-08-20 NOTE — PROGRESS NOTES
FAMILY MEDICINE PROGRESS NOTE  Karen Chaudhary 80 y o  male   DATE: August 20, 2020     ASSESSMENT and PLAN:  Param Godinez is a 80 y o  male with:     Problem List Items Addressed This Visit     None      Visit Diagnoses     Skin lesion of left lower limb    -  Primary    Relevant Orders    Ambulatory referral to General Surgery        See image below, possibly small hematoma, but may be loculated or require biopsy, so refer to GenSurg for definitive excision and management  Patient agreeable with the plan and expressed understanding  I discucssed signs and symptoms for which to RTC, go to ER or seek urgent medical care  SUBJECTIVE:  Param Godinez is a 80 y o  male who presents today with a chief complaint of Skin Problem (raised hard red area left leg)  Pt here today with his daughter for evaluation of lesion on his left thigh  Daughter states that the lesion was there at their chronic visit approximately 1-2 months ago, but she was not aware of it at that time since the patient was only wearing pants  She only noticed it since he was wearing shorts in the last few weeks  Patient states that lesion has been there for many months, does not cause him pain, he does not believe it has grown in size but the daughter believes it has grown in size  Patient denies any drainage from the lesion, but has not tried to touch it  States that occasionally it will itch and he puts Vaseline on it  Review of Systems   Constitutional: Negative for chills and fever  Skin: Negative for color change, rash and wound  I have reviewed the patient's Past Medical History  OBJECTIVE:  /70   Pulse 68   Temp 97 5 °F (36 4 °C)   Resp 16   Wt 61 2 kg (135 lb)   BMI 21 59 kg/m²    Physical Exam  Skin:                   Lexa Azevedo MD    Note: Portions of the record have been created with voice recognition software    Occasional wrong word or "sound a like" substitutions may have occurred due to the inherent limitations of voice recognition software  Read the chart carefully and recognize, using context, where substitutions have occurred

## 2020-08-31 PROBLEM — L98.9 SKIN LESION OF LEFT LOWER LIMB: Status: ACTIVE | Noted: 2020-08-31

## 2020-09-01 PROCEDURE — 88305 TISSUE EXAM BY PATHOLOGIST: CPT | Performed by: PATHOLOGY

## 2020-09-01 PROCEDURE — 88341 IMHCHEM/IMCYTCHM EA ADD ANTB: CPT | Performed by: PATHOLOGY

## 2020-09-01 PROCEDURE — 88313 SPECIAL STAINS GROUP 2: CPT | Performed by: PATHOLOGY

## 2020-09-01 PROCEDURE — 88342 IMHCHEM/IMCYTCHM 1ST ANTB: CPT | Performed by: PATHOLOGY

## 2020-09-21 PROBLEM — C44.729 SQUAMOUS CELL CARCINOMA, LEG, LEFT: Status: ACTIVE | Noted: 2020-09-21

## 2020-09-28 ENCOUNTER — TELEPHONE (OUTPATIENT)
Dept: FAMILY MEDICINE CLINIC | Facility: CLINIC | Age: 85
End: 2020-09-28

## 2020-09-28 NOTE — TELEPHONE ENCOUNTER
Patient phoned to check on appt date/time  Asked if labs can be mailed to him    Labs printed and mailed

## 2020-09-29 ENCOUNTER — IMMUNIZATIONS (OUTPATIENT)
Dept: GERIATRICS | Facility: OTHER | Age: 85
End: 2020-09-29
Payer: COMMERCIAL

## 2020-09-29 DIAGNOSIS — Z23 ENCOUNTER FOR IMMUNIZATION: ICD-10-CM

## 2020-09-29 PROCEDURE — 90662 IIV NO PRSV INCREASED AG IM: CPT | Performed by: FAMILY MEDICINE

## 2020-09-29 PROCEDURE — G0008 ADMIN INFLUENZA VIRUS VAC: HCPCS | Performed by: FAMILY MEDICINE

## 2020-09-29 NOTE — PROGRESS NOTES
[facility] at [address], [facility] [address]    Patient was here today for a Influenza high dose shot

## 2020-10-05 ENCOUNTER — OFFICE VISIT (OUTPATIENT)
Dept: SURGERY | Facility: CLINIC | Age: 85
End: 2020-10-05
Payer: COMMERCIAL

## 2020-10-05 DIAGNOSIS — C44.729 SQUAMOUS CELL CARCINOMA, LEG, LEFT: Primary | ICD-10-CM

## 2020-10-05 PROCEDURE — 1036F TOBACCO NON-USER: CPT | Performed by: SURGERY

## 2020-10-05 PROCEDURE — 99212 OFFICE O/P EST SF 10 MIN: CPT | Performed by: SURGERY

## 2020-10-05 PROCEDURE — 1160F RVW MEDS BY RX/DR IN RCRD: CPT | Performed by: SURGERY

## 2020-10-14 DIAGNOSIS — I10 ESSENTIAL HYPERTENSION: Chronic | ICD-10-CM

## 2020-10-14 RX ORDER — AMLODIPINE BESYLATE 10 MG/1
10 TABLET ORAL DAILY
Qty: 90 TABLET | Refills: 3 | Status: SHIPPED | OUTPATIENT
Start: 2020-10-14

## 2020-10-28 ENCOUNTER — OFFICE VISIT (OUTPATIENT)
Dept: FAMILY MEDICINE CLINIC | Facility: CLINIC | Age: 85
End: 2020-10-28
Payer: COMMERCIAL

## 2020-10-28 VITALS
SYSTOLIC BLOOD PRESSURE: 128 MMHG | HEART RATE: 88 BPM | HEIGHT: 66 IN | RESPIRATION RATE: 16 BRPM | TEMPERATURE: 97.2 F | DIASTOLIC BLOOD PRESSURE: 64 MMHG | WEIGHT: 145 LBS | BODY MASS INDEX: 23.3 KG/M2

## 2020-10-28 DIAGNOSIS — K21.9 GASTROESOPHAGEAL REFLUX DISEASE WITHOUT ESOPHAGITIS: ICD-10-CM

## 2020-10-28 DIAGNOSIS — N18.4 STAGE 4 CHRONIC KIDNEY DISEASE (HCC): ICD-10-CM

## 2020-10-28 DIAGNOSIS — I63.9 OCCIPITAL INFARCTION (HCC): ICD-10-CM

## 2020-10-28 DIAGNOSIS — Z00.00 MEDICARE ANNUAL WELLNESS VISIT, SUBSEQUENT: ICD-10-CM

## 2020-10-28 DIAGNOSIS — E78.00 PURE HYPERCHOLESTEROLEMIA: ICD-10-CM

## 2020-10-28 DIAGNOSIS — Z98.890 H/O CAROTID ENDARTERECTOMY: ICD-10-CM

## 2020-10-28 DIAGNOSIS — I63.81 BASAL GANGLIA STROKE (HCC): ICD-10-CM

## 2020-10-28 DIAGNOSIS — I65.21 STENOSIS OF RIGHT CAROTID ARTERY: ICD-10-CM

## 2020-10-28 DIAGNOSIS — I10 ESSENTIAL HYPERTENSION: Primary | ICD-10-CM

## 2020-10-28 PROBLEM — L98.9 SKIN LESION OF LEFT LOWER LIMB: Status: RESOLVED | Noted: 2020-08-31 | Resolved: 2020-10-28

## 2020-10-28 PROBLEM — N18.30 STAGE 3 CHRONIC KIDNEY DISEASE (HCC): Status: RESOLVED | Noted: 2019-10-24 | Resolved: 2020-10-28

## 2020-10-28 PROCEDURE — G0439 PPPS, SUBSEQ VISIT: HCPCS | Performed by: FAMILY MEDICINE

## 2020-10-28 PROCEDURE — 1170F FXNL STATUS ASSESSED: CPT | Performed by: FAMILY MEDICINE

## 2020-10-28 PROCEDURE — 3725F SCREEN DEPRESSION PERFORMED: CPT | Performed by: FAMILY MEDICINE

## 2020-10-28 PROCEDURE — 1101F PT FALLS ASSESS-DOCD LE1/YR: CPT | Performed by: FAMILY MEDICINE

## 2020-10-28 PROCEDURE — 3288F FALL RISK ASSESSMENT DOCD: CPT | Performed by: FAMILY MEDICINE

## 2020-10-28 PROCEDURE — 99214 OFFICE O/P EST MOD 30 MIN: CPT | Performed by: FAMILY MEDICINE

## 2020-10-28 PROCEDURE — 1160F RVW MEDS BY RX/DR IN RCRD: CPT | Performed by: FAMILY MEDICINE

## 2020-10-28 PROCEDURE — 1125F AMNT PAIN NOTED PAIN PRSNT: CPT | Performed by: FAMILY MEDICINE

## 2020-11-05 ENCOUNTER — TELEMEDICINE (OUTPATIENT)
Dept: FAMILY MEDICINE CLINIC | Facility: CLINIC | Age: 85
End: 2020-11-05
Payer: COMMERCIAL

## 2020-11-05 DIAGNOSIS — M54.2 NECK PAIN, ACUTE: ICD-10-CM

## 2020-11-05 DIAGNOSIS — R06.02 SOB (SHORTNESS OF BREATH) ON EXERTION: Primary | ICD-10-CM

## 2020-11-05 PROCEDURE — 99442 PR PHYS/QHP TELEPHONE EVALUATION 11-20 MIN: CPT | Performed by: PHYSICIAN ASSISTANT

## 2020-11-06 ENCOUNTER — OFFICE VISIT (OUTPATIENT)
Dept: FAMILY MEDICINE CLINIC | Facility: CLINIC | Age: 85
End: 2020-11-06
Payer: COMMERCIAL

## 2020-11-06 ENCOUNTER — APPOINTMENT (OUTPATIENT)
Dept: CT IMAGING | Facility: HOSPITAL | Age: 85
DRG: 684 | End: 2020-11-06
Payer: COMMERCIAL

## 2020-11-06 ENCOUNTER — HOSPITAL ENCOUNTER (INPATIENT)
Facility: HOSPITAL | Age: 85
LOS: 2 days | Discharge: NURSING, REHAB OR LONG-TERM CARE | DRG: 684 | End: 2020-11-09
Attending: EMERGENCY MEDICINE | Admitting: FAMILY MEDICINE
Payer: COMMERCIAL

## 2020-11-06 VITALS
BODY MASS INDEX: 23.03 KG/M2 | SYSTOLIC BLOOD PRESSURE: 110 MMHG | WEIGHT: 144 LBS | TEMPERATURE: 97.8 F | RESPIRATION RATE: 18 BRPM | OXYGEN SATURATION: 97 % | HEART RATE: 88 BPM | DIASTOLIC BLOOD PRESSURE: 64 MMHG

## 2020-11-06 DIAGNOSIS — R26.2 AMBULATORY DYSFUNCTION: ICD-10-CM

## 2020-11-06 DIAGNOSIS — R55 SYNCOPE: Primary | ICD-10-CM

## 2020-11-06 DIAGNOSIS — R06.02 SOB (SHORTNESS OF BREATH): ICD-10-CM

## 2020-11-06 DIAGNOSIS — K21.9 GASTROESOPHAGEAL REFLUX DISEASE WITHOUT ESOPHAGITIS: ICD-10-CM

## 2020-11-06 DIAGNOSIS — R40.4 TRANSIENT ALTERATION OF AWARENESS: Primary | ICD-10-CM

## 2020-11-06 DIAGNOSIS — S01.91XA TRAUMATIC HEAD INJURY WITH MULTIPLE LACERATIONS, INITIAL ENCOUNTER: ICD-10-CM

## 2020-11-06 DIAGNOSIS — S09.90XA TRAUMATIC HEAD INJURY WITH MULTIPLE LACERATIONS, INITIAL ENCOUNTER: ICD-10-CM

## 2020-11-06 DIAGNOSIS — M54.9 BACK PAIN: ICD-10-CM

## 2020-11-06 DIAGNOSIS — N17.9 AKI (ACUTE KIDNEY INJURY) (HCC): ICD-10-CM

## 2020-11-06 PROBLEM — D72.829 LEUKOCYTOSIS: Status: ACTIVE | Noted: 2020-11-06

## 2020-11-06 LAB
ALBUMIN SERPL BCP-MCNC: 2.7 G/DL (ref 3.5–5)
ALP SERPL-CCNC: 86 U/L (ref 46–116)
ALT SERPL W P-5'-P-CCNC: 27 U/L (ref 12–78)
ANION GAP SERPL CALCULATED.3IONS-SCNC: 10 MMOL/L (ref 4–13)
APTT PPP: 46 SECONDS (ref 23–37)
AST SERPL W P-5'-P-CCNC: 39 U/L (ref 5–45)
BACTERIA UR QL AUTO: ABNORMAL /HPF
BASOPHILS # BLD AUTO: 0.05 THOUSANDS/ΜL (ref 0–0.1)
BASOPHILS NFR BLD AUTO: 0 % (ref 0–1)
BILIRUB SERPL-MCNC: 0.53 MG/DL (ref 0.2–1)
BILIRUB UR QL STRIP: NEGATIVE
BUN SERPL-MCNC: 47 MG/DL (ref 5–25)
CALCIUM ALBUM COR SERPL-MCNC: 9.6 MG/DL (ref 8.3–10.1)
CALCIUM SERPL-MCNC: 8.6 MG/DL (ref 8.3–10.1)
CHLORIDE SERPL-SCNC: 102 MMOL/L (ref 100–108)
CLARITY UR: CLEAR
CO2 SERPL-SCNC: 23 MMOL/L (ref 21–32)
COLOR UR: YELLOW
CREAT SERPL-MCNC: 2.35 MG/DL (ref 0.6–1.3)
EOSINOPHIL # BLD AUTO: 0.15 THOUSAND/ΜL (ref 0–0.61)
EOSINOPHIL NFR BLD AUTO: 1 % (ref 0–6)
ERYTHROCYTE [DISTWIDTH] IN BLOOD BY AUTOMATED COUNT: 13.2 % (ref 11.6–15.1)
GFR SERPL CREATININE-BSD FRML MDRD: 23 ML/MIN/1.73SQ M
GLUCOSE SERPL-MCNC: 123 MG/DL (ref 65–140)
GLUCOSE UR STRIP-MCNC: NEGATIVE MG/DL
HCT VFR BLD AUTO: 33.3 % (ref 36.5–49.3)
HGB BLD-MCNC: 10.8 G/DL (ref 12–17)
HGB UR QL STRIP.AUTO: ABNORMAL
IMM GRANULOCYTES # BLD AUTO: 0.11 THOUSAND/UL (ref 0–0.2)
IMM GRANULOCYTES NFR BLD AUTO: 1 % (ref 0–2)
INR PPP: 1.12 (ref 0.84–1.19)
KETONES UR STRIP-MCNC: NEGATIVE MG/DL
LEUKOCYTE ESTERASE UR QL STRIP: NEGATIVE
LYMPHOCYTES # BLD AUTO: 1.03 THOUSANDS/ΜL (ref 0.6–4.47)
LYMPHOCYTES NFR BLD AUTO: 7 % (ref 14–44)
MCH RBC QN AUTO: 30.4 PG (ref 26.8–34.3)
MCHC RBC AUTO-ENTMCNC: 32.4 G/DL (ref 31.4–37.4)
MCV RBC AUTO: 94 FL (ref 82–98)
MONOCYTES # BLD AUTO: 1.05 THOUSAND/ΜL (ref 0.17–1.22)
MONOCYTES NFR BLD AUTO: 7 % (ref 4–12)
NEUTROPHILS # BLD AUTO: 12.71 THOUSANDS/ΜL (ref 1.85–7.62)
NEUTS SEG NFR BLD AUTO: 84 % (ref 43–75)
NITRITE UR QL STRIP: NEGATIVE
NON-SQ EPI CELLS URNS QL MICRO: ABNORMAL /HPF
NRBC BLD AUTO-RTO: 0 /100 WBCS
PH UR STRIP.AUTO: 6 [PH]
PLATELET # BLD AUTO: 271 THOUSANDS/UL (ref 149–390)
PMV BLD AUTO: 9.8 FL (ref 8.9–12.7)
POTASSIUM SERPL-SCNC: 4.4 MMOL/L (ref 3.5–5.3)
PROT SERPL-MCNC: 7.1 G/DL (ref 6.4–8.2)
PROT UR STRIP-MCNC: ABNORMAL MG/DL
PROTHROMBIN TIME: 14.5 SECONDS (ref 11.6–14.5)
RBC # BLD AUTO: 3.55 MILLION/UL (ref 3.88–5.62)
RBC #/AREA URNS AUTO: ABNORMAL /HPF
SODIUM SERPL-SCNC: 135 MMOL/L (ref 136–145)
SP GR UR STRIP.AUTO: 1.02 (ref 1–1.03)
TROPONIN I SERPL-MCNC: <0.02 NG/ML
UROBILINOGEN UR QL STRIP.AUTO: 0.2 E.U./DL
WBC # BLD AUTO: 15.1 THOUSAND/UL (ref 4.31–10.16)
WBC #/AREA URNS AUTO: ABNORMAL /HPF

## 2020-11-06 PROCEDURE — 70450 CT HEAD/BRAIN W/O DYE: CPT

## 2020-11-06 PROCEDURE — 1160F RVW MEDS BY RX/DR IN RCRD: CPT | Performed by: PHYSICIAN ASSISTANT

## 2020-11-06 PROCEDURE — 85025 COMPLETE CBC W/AUTO DIFF WBC: CPT | Performed by: EMERGENCY MEDICINE

## 2020-11-06 PROCEDURE — G1004 CDSM NDSC: HCPCS

## 2020-11-06 PROCEDURE — 81001 URINALYSIS AUTO W/SCOPE: CPT | Performed by: INTERNAL MEDICINE

## 2020-11-06 PROCEDURE — 96360 HYDRATION IV INFUSION INIT: CPT

## 2020-11-06 PROCEDURE — 84484 ASSAY OF TROPONIN QUANT: CPT | Performed by: EMERGENCY MEDICINE

## 2020-11-06 PROCEDURE — 85610 PROTHROMBIN TIME: CPT | Performed by: EMERGENCY MEDICINE

## 2020-11-06 PROCEDURE — 85730 THROMBOPLASTIN TIME PARTIAL: CPT | Performed by: EMERGENCY MEDICINE

## 2020-11-06 PROCEDURE — 99285 EMERGENCY DEPT VISIT HI MDM: CPT | Performed by: EMERGENCY MEDICINE

## 2020-11-06 PROCEDURE — 1036F TOBACCO NON-USER: CPT | Performed by: PHYSICIAN ASSISTANT

## 2020-11-06 PROCEDURE — 36415 COLL VENOUS BLD VENIPUNCTURE: CPT | Performed by: EMERGENCY MEDICINE

## 2020-11-06 PROCEDURE — 93005 ELECTROCARDIOGRAM TRACING: CPT

## 2020-11-06 PROCEDURE — 99285 EMERGENCY DEPT VISIT HI MDM: CPT

## 2020-11-06 PROCEDURE — 99215 OFFICE O/P EST HI 40 MIN: CPT | Performed by: PHYSICIAN ASSISTANT

## 2020-11-06 PROCEDURE — 80053 COMPREHEN METABOLIC PANEL: CPT | Performed by: EMERGENCY MEDICINE

## 2020-11-06 PROCEDURE — 99220 PR INITIAL OBSERVATION CARE/DAY 70 MINUTES: CPT | Performed by: INTERNAL MEDICINE

## 2020-11-06 RX ORDER — SODIUM CHLORIDE 9 MG/ML
75 INJECTION, SOLUTION INTRAVENOUS CONTINUOUS
Status: DISCONTINUED | OUTPATIENT
Start: 2020-11-06 | End: 2020-11-08

## 2020-11-06 RX ORDER — HYDRALAZINE HYDROCHLORIDE 20 MG/ML
5 INJECTION INTRAMUSCULAR; INTRAVENOUS EVERY 6 HOURS PRN
Status: DISCONTINUED | OUTPATIENT
Start: 2020-11-06 | End: 2020-11-09 | Stop reason: HOSPADM

## 2020-11-06 RX ADMIN — SODIUM CHLORIDE 75 ML/HR: 0.9 INJECTION, SOLUTION INTRAVENOUS at 17:28

## 2020-11-07 LAB
ANION GAP SERPL CALCULATED.3IONS-SCNC: 9 MMOL/L (ref 4–13)
ATRIAL RATE: 80 BPM
BASOPHILS # BLD AUTO: 0.06 THOUSANDS/ΜL (ref 0–0.1)
BASOPHILS NFR BLD AUTO: 0 % (ref 0–1)
BUN SERPL-MCNC: 35 MG/DL (ref 5–25)
CALCIUM SERPL-MCNC: 8.8 MG/DL (ref 8.3–10.1)
CHLORIDE SERPL-SCNC: 105 MMOL/L (ref 100–108)
CO2 SERPL-SCNC: 23 MMOL/L (ref 21–32)
CREAT SERPL-MCNC: 1.85 MG/DL (ref 0.6–1.3)
EOSINOPHIL # BLD AUTO: 0.3 THOUSAND/ΜL (ref 0–0.61)
EOSINOPHIL NFR BLD AUTO: 2 % (ref 0–6)
ERYTHROCYTE [DISTWIDTH] IN BLOOD BY AUTOMATED COUNT: 13.2 % (ref 11.6–15.1)
GFR SERPL CREATININE-BSD FRML MDRD: 31 ML/MIN/1.73SQ M
GLUCOSE P FAST SERPL-MCNC: 102 MG/DL (ref 65–99)
GLUCOSE SERPL-MCNC: 102 MG/DL (ref 65–140)
HCT VFR BLD AUTO: 33.4 % (ref 36.5–49.3)
HGB BLD-MCNC: 11.1 G/DL (ref 12–17)
IMM GRANULOCYTES # BLD AUTO: 0.06 THOUSAND/UL (ref 0–0.2)
IMM GRANULOCYTES NFR BLD AUTO: 0 % (ref 0–2)
LYMPHOCYTES # BLD AUTO: 1.63 THOUSANDS/ΜL (ref 0.6–4.47)
LYMPHOCYTES NFR BLD AUTO: 12 % (ref 14–44)
MCH RBC QN AUTO: 30.7 PG (ref 26.8–34.3)
MCHC RBC AUTO-ENTMCNC: 33.2 G/DL (ref 31.4–37.4)
MCV RBC AUTO: 92 FL (ref 82–98)
MONOCYTES # BLD AUTO: 1.02 THOUSAND/ΜL (ref 0.17–1.22)
MONOCYTES NFR BLD AUTO: 8 % (ref 4–12)
NEUTROPHILS # BLD AUTO: 10.55 THOUSANDS/ΜL (ref 1.85–7.62)
NEUTS SEG NFR BLD AUTO: 78 % (ref 43–75)
NRBC BLD AUTO-RTO: 0 /100 WBCS
P AXIS: 47 DEGREES
PLATELET # BLD AUTO: 266 THOUSANDS/UL (ref 149–390)
PMV BLD AUTO: 9.4 FL (ref 8.9–12.7)
POTASSIUM SERPL-SCNC: 4.1 MMOL/L (ref 3.5–5.3)
PR INTERVAL: 134 MS
QRS AXIS: 40 DEGREES
QRSD INTERVAL: 110 MS
QT INTERVAL: 362 MS
QTC INTERVAL: 417 MS
RBC # BLD AUTO: 3.62 MILLION/UL (ref 3.88–5.62)
SODIUM SERPL-SCNC: 137 MMOL/L (ref 136–145)
T WAVE AXIS: 53 DEGREES
VENTRICULAR RATE: 80 BPM
WBC # BLD AUTO: 13.62 THOUSAND/UL (ref 4.31–10.16)

## 2020-11-07 PROCEDURE — 36415 COLL VENOUS BLD VENIPUNCTURE: CPT | Performed by: INTERNAL MEDICINE

## 2020-11-07 PROCEDURE — 99224 PR SBSQ OBSERVATION CARE/DAY 15 MINUTES: CPT | Performed by: FAMILY MEDICINE

## 2020-11-07 PROCEDURE — 93010 ELECTROCARDIOGRAM REPORT: CPT | Performed by: INTERNAL MEDICINE

## 2020-11-07 PROCEDURE — 80048 BASIC METABOLIC PNL TOTAL CA: CPT | Performed by: INTERNAL MEDICINE

## 2020-11-07 PROCEDURE — 85025 COMPLETE CBC W/AUTO DIFF WBC: CPT | Performed by: INTERNAL MEDICINE

## 2020-11-07 RX ORDER — PRAVASTATIN SODIUM 20 MG
20 TABLET ORAL EVERY OTHER DAY
Status: DISCONTINUED | OUTPATIENT
Start: 2020-11-07 | End: 2020-11-09 | Stop reason: HOSPADM

## 2020-11-07 RX ORDER — ASPIRIN 81 MG/1
81 TABLET ORAL DAILY
Status: DISCONTINUED | OUTPATIENT
Start: 2020-11-07 | End: 2020-11-09 | Stop reason: HOSPADM

## 2020-11-07 RX ORDER — LISINOPRIL 5 MG/1
5 TABLET ORAL DAILY
Status: DISCONTINUED | OUTPATIENT
Start: 2020-11-07 | End: 2020-11-09 | Stop reason: HOSPADM

## 2020-11-07 RX ORDER — ACETAMINOPHEN 325 MG/1
500 TABLET ORAL EVERY 6 HOURS PRN
Status: DISCONTINUED | OUTPATIENT
Start: 2020-11-07 | End: 2020-11-08

## 2020-11-07 RX ORDER — HEPARIN SODIUM 5000 [USP'U]/ML
5000 INJECTION, SOLUTION INTRAVENOUS; SUBCUTANEOUS EVERY 8 HOURS SCHEDULED
Status: DISCONTINUED | OUTPATIENT
Start: 2020-11-07 | End: 2020-11-09 | Stop reason: HOSPADM

## 2020-11-07 RX ORDER — PRAVASTATIN SODIUM 40 MG
40 TABLET ORAL EVERY OTHER DAY
Status: DISCONTINUED | OUTPATIENT
Start: 2020-11-08 | End: 2020-11-09 | Stop reason: HOSPADM

## 2020-11-07 RX ORDER — PRAVASTATIN SODIUM 20 MG
20 TABLET ORAL DAILY
Status: DISCONTINUED | OUTPATIENT
Start: 2020-11-07 | End: 2020-11-07

## 2020-11-07 RX ORDER — MELATONIN
1000 DAILY
Status: DISCONTINUED | OUTPATIENT
Start: 2020-11-07 | End: 2020-11-09 | Stop reason: HOSPADM

## 2020-11-07 RX ORDER — AMLODIPINE BESYLATE 10 MG/1
10 TABLET ORAL DAILY
Status: DISCONTINUED | OUTPATIENT
Start: 2020-11-07 | End: 2020-11-09 | Stop reason: HOSPADM

## 2020-11-07 RX ADMIN — AMLODIPINE BESYLATE 10 MG: 10 TABLET ORAL at 10:10

## 2020-11-07 RX ADMIN — ACETAMINOPHEN 488 MG: 325 TABLET, FILM COATED ORAL at 00:34

## 2020-11-07 RX ADMIN — ACETAMINOPHEN 488 MG: 325 TABLET, FILM COATED ORAL at 20:45

## 2020-11-07 RX ADMIN — HEPARIN SODIUM 5000 UNITS: 5000 INJECTION INTRAVENOUS; SUBCUTANEOUS at 13:58

## 2020-11-07 RX ADMIN — HEPARIN SODIUM 5000 UNITS: 5000 INJECTION INTRAVENOUS; SUBCUTANEOUS at 22:41

## 2020-11-07 RX ADMIN — ACETAMINOPHEN 488 MG: 325 TABLET, FILM COATED ORAL at 11:22

## 2020-11-07 RX ADMIN — LISINOPRIL 5 MG: 5 TABLET ORAL at 10:10

## 2020-11-07 RX ADMIN — HEPARIN SODIUM 5000 UNITS: 5000 INJECTION INTRAVENOUS; SUBCUTANEOUS at 05:59

## 2020-11-07 RX ADMIN — Medication 1000 UNITS: at 10:10

## 2020-11-07 RX ADMIN — PRAVASTATIN SODIUM 20 MG: 40 TABLET ORAL at 10:18

## 2020-11-07 RX ADMIN — Medication 1 TABLET: at 10:10

## 2020-11-07 RX ADMIN — ASPIRIN 81 MG: 81 TABLET ORAL at 10:17

## 2020-11-08 ENCOUNTER — APPOINTMENT (INPATIENT)
Dept: NON INVASIVE DIAGNOSTICS | Facility: HOSPITAL | Age: 85
DRG: 684 | End: 2020-11-08
Payer: COMMERCIAL

## 2020-11-08 PROBLEM — N17.9 ACUTE RENAL FAILURE SUPERIMPOSED ON STAGE 3 CHRONIC KIDNEY DISEASE (HCC): Status: ACTIVE | Noted: 2020-11-08

## 2020-11-08 PROBLEM — I95.1 ORTHOSTASIS: Status: ACTIVE | Noted: 2020-11-08

## 2020-11-08 PROBLEM — N18.30 ACUTE RENAL FAILURE SUPERIMPOSED ON STAGE 3 CHRONIC KIDNEY DISEASE (HCC): Status: ACTIVE | Noted: 2020-11-08

## 2020-11-08 PROCEDURE — 97166 OT EVAL MOD COMPLEX 45 MIN: CPT

## 2020-11-08 PROCEDURE — 93306 TTE W/DOPPLER COMPLETE: CPT

## 2020-11-08 PROCEDURE — 99232 SBSQ HOSP IP/OBS MODERATE 35: CPT | Performed by: PHYSICIAN ASSISTANT

## 2020-11-08 PROCEDURE — 97110 THERAPEUTIC EXERCISES: CPT

## 2020-11-08 PROCEDURE — 93306 TTE W/DOPPLER COMPLETE: CPT | Performed by: INTERNAL MEDICINE

## 2020-11-08 PROCEDURE — 97163 PT EVAL HIGH COMPLEX 45 MIN: CPT

## 2020-11-08 RX ORDER — ACETAMINOPHEN 325 MG/1
650 TABLET ORAL EVERY 6 HOURS PRN
Status: DISCONTINUED | OUTPATIENT
Start: 2020-11-08 | End: 2020-11-09 | Stop reason: HOSPADM

## 2020-11-08 RX ORDER — ONDANSETRON 2 MG/ML
4 INJECTION INTRAMUSCULAR; INTRAVENOUS EVERY 4 HOURS PRN
Status: DISCONTINUED | OUTPATIENT
Start: 2020-11-08 | End: 2020-11-09 | Stop reason: HOSPADM

## 2020-11-08 RX ADMIN — HEPARIN SODIUM 5000 UNITS: 5000 INJECTION INTRAVENOUS; SUBCUTANEOUS at 14:43

## 2020-11-08 RX ADMIN — ACETAMINOPHEN 488 MG: 325 TABLET, FILM COATED ORAL at 03:44

## 2020-11-08 RX ADMIN — HEPARIN SODIUM 5000 UNITS: 5000 INJECTION INTRAVENOUS; SUBCUTANEOUS at 22:50

## 2020-11-08 RX ADMIN — Medication 1000 UNITS: at 08:17

## 2020-11-08 RX ADMIN — AMLODIPINE BESYLATE 10 MG: 10 TABLET ORAL at 08:17

## 2020-11-08 RX ADMIN — PRAVASTATIN SODIUM 40 MG: 20 TABLET ORAL at 08:16

## 2020-11-08 RX ADMIN — SODIUM CHLORIDE 500 ML: 0.9 INJECTION, SOLUTION INTRAVENOUS at 16:02

## 2020-11-08 RX ADMIN — SODIUM CHLORIDE 75 ML/HR: 0.9 INJECTION, SOLUTION INTRAVENOUS at 14:43

## 2020-11-08 RX ADMIN — SODIUM CHLORIDE 75 ML/HR: 0.9 INJECTION, SOLUTION INTRAVENOUS at 16:02

## 2020-11-08 RX ADMIN — HEPARIN SODIUM 5000 UNITS: 5000 INJECTION INTRAVENOUS; SUBCUTANEOUS at 06:46

## 2020-11-08 RX ADMIN — LISINOPRIL 5 MG: 5 TABLET ORAL at 08:17

## 2020-11-08 RX ADMIN — Medication 1 TABLET: at 08:16

## 2020-11-08 RX ADMIN — ONDANSETRON 4 MG: 2 INJECTION INTRAMUSCULAR; INTRAVENOUS at 22:50

## 2020-11-08 RX ADMIN — ASPIRIN 81 MG: 81 TABLET ORAL at 08:17

## 2020-11-09 VITALS
OXYGEN SATURATION: 95 % | WEIGHT: 145.94 LBS | DIASTOLIC BLOOD PRESSURE: 79 MMHG | SYSTOLIC BLOOD PRESSURE: 136 MMHG | TEMPERATURE: 98.4 F | HEART RATE: 92 BPM | RESPIRATION RATE: 18 BRPM | HEIGHT: 68 IN | BODY MASS INDEX: 22.12 KG/M2

## 2020-11-09 PROBLEM — M54.9 BACK PAIN: Status: ACTIVE | Noted: 2020-11-09

## 2020-11-09 LAB
ANION GAP SERPL CALCULATED.3IONS-SCNC: 11 MMOL/L (ref 4–13)
BUN SERPL-MCNC: 28 MG/DL (ref 5–25)
CALCIUM SERPL-MCNC: 8.8 MG/DL (ref 8.3–10.1)
CHLORIDE SERPL-SCNC: 102 MMOL/L (ref 100–108)
CO2 SERPL-SCNC: 23 MMOL/L (ref 21–32)
CREAT SERPL-MCNC: 1.86 MG/DL (ref 0.6–1.3)
ERYTHROCYTE [DISTWIDTH] IN BLOOD BY AUTOMATED COUNT: 13.2 % (ref 11.6–15.1)
GFR SERPL CREATININE-BSD FRML MDRD: 31 ML/MIN/1.73SQ M
GLUCOSE SERPL-MCNC: 92 MG/DL (ref 65–140)
HCT VFR BLD AUTO: 35.5 % (ref 36.5–49.3)
HGB BLD-MCNC: 11.4 G/DL (ref 12–17)
MCH RBC QN AUTO: 30.3 PG (ref 26.8–34.3)
MCHC RBC AUTO-ENTMCNC: 32.1 G/DL (ref 31.4–37.4)
MCV RBC AUTO: 94 FL (ref 82–98)
PLATELET # BLD AUTO: 268 THOUSANDS/UL (ref 149–390)
PMV BLD AUTO: 9.8 FL (ref 8.9–12.7)
POTASSIUM SERPL-SCNC: 4.4 MMOL/L (ref 3.5–5.3)
RBC # BLD AUTO: 3.76 MILLION/UL (ref 3.88–5.62)
SARS-COV-2 RNA RESP QL NAA+PROBE: NEGATIVE
SODIUM SERPL-SCNC: 136 MMOL/L (ref 136–145)
WBC # BLD AUTO: 11.29 THOUSAND/UL (ref 4.31–10.16)

## 2020-11-09 PROCEDURE — 85027 COMPLETE CBC AUTOMATED: CPT | Performed by: PHYSICIAN ASSISTANT

## 2020-11-09 PROCEDURE — 87635 SARS-COV-2 COVID-19 AMP PRB: CPT | Performed by: PHYSICIAN ASSISTANT

## 2020-11-09 PROCEDURE — 99239 HOSP IP/OBS DSCHRG MGMT >30: CPT | Performed by: PHYSICIAN ASSISTANT

## 2020-11-09 PROCEDURE — 80048 BASIC METABOLIC PNL TOTAL CA: CPT | Performed by: PHYSICIAN ASSISTANT

## 2020-11-09 RX ORDER — LIDOCAINE 50 MG/G
1 PATCH TOPICAL DAILY
Status: DISCONTINUED | OUTPATIENT
Start: 2020-11-09 | End: 2020-11-09 | Stop reason: HOSPADM

## 2020-11-09 RX ORDER — MAGNESIUM HYDROXIDE/ALUMINUM HYDROXICE/SIMETHICONE 120; 1200; 1200 MG/30ML; MG/30ML; MG/30ML
30 SUSPENSION ORAL EVERY 4 HOURS PRN
Status: DISCONTINUED | OUTPATIENT
Start: 2020-11-09 | End: 2020-11-09 | Stop reason: HOSPADM

## 2020-11-09 RX ORDER — OXYCODONE HYDROCHLORIDE 5 MG/1
2.5 TABLET ORAL ONCE
Status: DISCONTINUED | OUTPATIENT
Start: 2020-11-09 | End: 2020-11-09 | Stop reason: HOSPADM

## 2020-11-09 RX ORDER — LIDOCAINE 50 MG/G
1 PATCH TOPICAL DAILY
Refills: 0
Start: 2020-11-09 | End: 2020-12-22

## 2020-11-09 RX ORDER — MAGNESIUM HYDROXIDE/ALUMINUM HYDROXICE/SIMETHICONE 120; 1200; 1200 MG/30ML; MG/30ML; MG/30ML
30 SUSPENSION ORAL EVERY 4 HOURS PRN
Qty: 355 ML | Refills: 0
Start: 2020-11-09 | End: 2021-01-10

## 2020-11-09 RX ADMIN — ASPIRIN 81 MG: 81 TABLET ORAL at 08:26

## 2020-11-09 RX ADMIN — Medication 1000 UNITS: at 08:26

## 2020-11-09 RX ADMIN — HEPARIN SODIUM 5000 UNITS: 5000 INJECTION INTRAVENOUS; SUBCUTANEOUS at 06:24

## 2020-11-09 RX ADMIN — ALUMINUM HYDROXIDE, MAGNESIUM HYDROXIDE, AND SIMETHICONE 30 ML: 200; 200; 20 SUSPENSION ORAL at 13:33

## 2020-11-09 RX ADMIN — PRAVASTATIN SODIUM 20 MG: 40 TABLET ORAL at 08:26

## 2020-11-09 RX ADMIN — LIDOCAINE 5% 1 PATCH: 700 PATCH TOPICAL at 08:27

## 2020-11-09 RX ADMIN — HEPARIN SODIUM 5000 UNITS: 5000 INJECTION INTRAVENOUS; SUBCUTANEOUS at 13:31

## 2020-11-09 RX ADMIN — AMLODIPINE BESYLATE 10 MG: 10 TABLET ORAL at 08:26

## 2020-11-09 RX ADMIN — LISINOPRIL 5 MG: 5 TABLET ORAL at 08:27

## 2020-11-09 RX ADMIN — Medication 1 TABLET: at 08:26

## 2020-11-10 ENCOUNTER — NURSING HOME VISIT (OUTPATIENT)
Dept: GERIATRICS | Facility: OTHER | Age: 85
End: 2020-11-10
Payer: COMMERCIAL

## 2020-11-10 ENCOUNTER — TRANSITIONAL CARE MANAGEMENT (OUTPATIENT)
Dept: FAMILY MEDICINE CLINIC | Facility: CLINIC | Age: 85
End: 2020-11-10

## 2020-11-10 DIAGNOSIS — R55 SYNCOPE, UNSPECIFIED SYNCOPE TYPE: Primary | Chronic | ICD-10-CM

## 2020-11-10 DIAGNOSIS — R26.2 AMBULATORY DYSFUNCTION: ICD-10-CM

## 2020-11-10 DIAGNOSIS — I95.1 ORTHOSTASIS: ICD-10-CM

## 2020-11-10 DIAGNOSIS — N17.9 ACUTE RENAL FAILURE SUPERIMPOSED ON STAGE 3B CHRONIC KIDNEY DISEASE, UNSPECIFIED ACUTE RENAL FAILURE TYPE (HCC): ICD-10-CM

## 2020-11-10 DIAGNOSIS — I63.81 BASAL GANGLIA STROKE (HCC): ICD-10-CM

## 2020-11-10 DIAGNOSIS — R63.8 DECREASED ORAL INTAKE: ICD-10-CM

## 2020-11-10 DIAGNOSIS — D72.829 LEUKOCYTOSIS, UNSPECIFIED TYPE: ICD-10-CM

## 2020-11-10 DIAGNOSIS — N18.32 ACUTE RENAL FAILURE SUPERIMPOSED ON STAGE 3B CHRONIC KIDNEY DISEASE, UNSPECIFIED ACUTE RENAL FAILURE TYPE (HCC): ICD-10-CM

## 2020-11-10 DIAGNOSIS — I10 ESSENTIAL HYPERTENSION: Chronic | ICD-10-CM

## 2020-11-10 PROCEDURE — 99306 1ST NF CARE HIGH MDM 50: CPT | Performed by: FAMILY MEDICINE

## 2020-11-16 ENCOUNTER — NURSING HOME VISIT (OUTPATIENT)
Dept: GERIATRICS | Facility: OTHER | Age: 85
End: 2020-11-16
Payer: COMMERCIAL

## 2020-11-16 DIAGNOSIS — E87.1 HYPONATREMIA: ICD-10-CM

## 2020-11-16 DIAGNOSIS — I10 ESSENTIAL HYPERTENSION: Chronic | ICD-10-CM

## 2020-11-16 DIAGNOSIS — F51.01 PRIMARY INSOMNIA: ICD-10-CM

## 2020-11-16 DIAGNOSIS — M54.50 CHRONIC BILATERAL LOW BACK PAIN WITHOUT SCIATICA: Primary | ICD-10-CM

## 2020-11-16 DIAGNOSIS — G89.29 CHRONIC BILATERAL LOW BACK PAIN WITHOUT SCIATICA: Primary | ICD-10-CM

## 2020-11-16 DIAGNOSIS — M54.2 NECK PAIN: ICD-10-CM

## 2020-11-16 PROCEDURE — 99309 SBSQ NF CARE MODERATE MDM 30: CPT | Performed by: NURSE PRACTITIONER

## 2020-12-06 ENCOUNTER — TELEPHONE (OUTPATIENT)
Dept: OTHER | Facility: OTHER | Age: 85
End: 2020-12-06

## 2020-12-07 ENCOUNTER — NURSING HOME VISIT (OUTPATIENT)
Dept: GERIATRICS | Facility: OTHER | Age: 85
End: 2020-12-07
Payer: COMMERCIAL

## 2020-12-07 DIAGNOSIS — M54.50 CHRONIC BILATERAL LOW BACK PAIN WITHOUT SCIATICA: ICD-10-CM

## 2020-12-07 DIAGNOSIS — G89.29 CHRONIC BILATERAL LOW BACK PAIN WITHOUT SCIATICA: ICD-10-CM

## 2020-12-07 DIAGNOSIS — I10 ESSENTIAL HYPERTENSION: Primary | Chronic | ICD-10-CM

## 2020-12-07 PROCEDURE — 99308 SBSQ NF CARE LOW MDM 20: CPT | Performed by: NURSE PRACTITIONER

## 2020-12-15 ENCOUNTER — NURSING HOME VISIT (OUTPATIENT)
Dept: GERIATRICS | Facility: OTHER | Age: 85
End: 2020-12-15
Payer: COMMERCIAL

## 2020-12-15 ENCOUNTER — TELEPHONE (OUTPATIENT)
Dept: OTHER | Facility: OTHER | Age: 85
End: 2020-12-15

## 2020-12-15 DIAGNOSIS — N18.32 ACUTE RENAL FAILURE SUPERIMPOSED ON STAGE 3B CHRONIC KIDNEY DISEASE, UNSPECIFIED ACUTE RENAL FAILURE TYPE (HCC): ICD-10-CM

## 2020-12-15 DIAGNOSIS — R55 SYNCOPE, UNSPECIFIED SYNCOPE TYPE: Chronic | ICD-10-CM

## 2020-12-15 DIAGNOSIS — E87.1 HYPONATREMIA: ICD-10-CM

## 2020-12-15 DIAGNOSIS — I95.1 ORTHOSTASIS: ICD-10-CM

## 2020-12-15 DIAGNOSIS — I63.9 OCCIPITAL INFARCTION (HCC): ICD-10-CM

## 2020-12-15 DIAGNOSIS — I10 ESSENTIAL HYPERTENSION: Primary | Chronic | ICD-10-CM

## 2020-12-15 DIAGNOSIS — D72.829 LEUKOCYTOSIS, UNSPECIFIED TYPE: ICD-10-CM

## 2020-12-15 DIAGNOSIS — N17.9 ACUTE RENAL FAILURE SUPERIMPOSED ON STAGE 3B CHRONIC KIDNEY DISEASE, UNSPECIFIED ACUTE RENAL FAILURE TYPE (HCC): ICD-10-CM

## 2020-12-15 PROCEDURE — 99309 SBSQ NF CARE MODERATE MDM 30: CPT | Performed by: FAMILY MEDICINE

## 2020-12-16 ENCOUNTER — NURSING HOME VISIT (OUTPATIENT)
Dept: GERIATRICS | Facility: OTHER | Age: 85
End: 2020-12-16
Payer: COMMERCIAL

## 2020-12-16 DIAGNOSIS — N18.32 ACUTE RENAL FAILURE SUPERIMPOSED ON STAGE 3B CHRONIC KIDNEY DISEASE, UNSPECIFIED ACUTE RENAL FAILURE TYPE (HCC): ICD-10-CM

## 2020-12-16 DIAGNOSIS — N17.9 ACUTE RENAL FAILURE SUPERIMPOSED ON STAGE 3B CHRONIC KIDNEY DISEASE, UNSPECIFIED ACUTE RENAL FAILURE TYPE (HCC): ICD-10-CM

## 2020-12-16 DIAGNOSIS — R63.8 DECREASED ORAL INTAKE: ICD-10-CM

## 2020-12-16 DIAGNOSIS — E87.1 HYPONATREMIA: ICD-10-CM

## 2020-12-16 DIAGNOSIS — U07.1 COVID-19 VIRUS INFECTION: ICD-10-CM

## 2020-12-16 DIAGNOSIS — Z71.89 GOALS OF CARE, COUNSELING/DISCUSSION: ICD-10-CM

## 2020-12-16 DIAGNOSIS — R55 SYNCOPE, UNSPECIFIED SYNCOPE TYPE: Primary | Chronic | ICD-10-CM

## 2020-12-16 PROCEDURE — 99310 SBSQ NF CARE HIGH MDM 45: CPT | Performed by: FAMILY MEDICINE

## 2020-12-21 ENCOUNTER — NURSING HOME VISIT (OUTPATIENT)
Dept: GERIATRICS | Facility: OTHER | Age: 85
End: 2020-12-21
Payer: COMMERCIAL

## 2020-12-21 DIAGNOSIS — R55 SYNCOPE, UNSPECIFIED SYNCOPE TYPE: Chronic | ICD-10-CM

## 2020-12-21 DIAGNOSIS — N17.9 ACUTE RENAL FAILURE SUPERIMPOSED ON STAGE 3B CHRONIC KIDNEY DISEASE, UNSPECIFIED ACUTE RENAL FAILURE TYPE (HCC): Primary | ICD-10-CM

## 2020-12-21 DIAGNOSIS — N18.32 ACUTE RENAL FAILURE SUPERIMPOSED ON STAGE 3B CHRONIC KIDNEY DISEASE, UNSPECIFIED ACUTE RENAL FAILURE TYPE (HCC): Primary | ICD-10-CM

## 2020-12-21 DIAGNOSIS — R63.8 DECREASED ORAL INTAKE: ICD-10-CM

## 2020-12-21 DIAGNOSIS — U07.1 COVID-19 VIRUS INFECTION: ICD-10-CM

## 2020-12-21 DIAGNOSIS — E87.1 HYPONATREMIA: ICD-10-CM

## 2020-12-21 PROCEDURE — 99309 SBSQ NF CARE MODERATE MDM 30: CPT | Performed by: FAMILY MEDICINE

## 2020-12-22 ENCOUNTER — HOSPITAL ENCOUNTER (INPATIENT)
Facility: HOSPITAL | Age: 85
LOS: 6 days | Discharge: NURSING, REHAB OR LONG-TERM CARE | DRG: 871 | End: 2020-12-28
Attending: EMERGENCY MEDICINE | Admitting: INTERNAL MEDICINE
Payer: COMMERCIAL

## 2020-12-22 ENCOUNTER — APPOINTMENT (INPATIENT)
Dept: RADIOLOGY | Facility: HOSPITAL | Age: 85
DRG: 871 | End: 2020-12-22
Payer: COMMERCIAL

## 2020-12-22 ENCOUNTER — APPOINTMENT (EMERGENCY)
Dept: RADIOLOGY | Facility: HOSPITAL | Age: 85
DRG: 871 | End: 2020-12-22
Payer: COMMERCIAL

## 2020-12-22 DIAGNOSIS — I10 ESSENTIAL HYPERTENSION: Chronic | ICD-10-CM

## 2020-12-22 DIAGNOSIS — R11.10 VOMITING: ICD-10-CM

## 2020-12-22 DIAGNOSIS — U07.1 COVID-19 VIRUS INFECTION: ICD-10-CM

## 2020-12-22 DIAGNOSIS — R50.9 FEVER: ICD-10-CM

## 2020-12-22 DIAGNOSIS — R09.02 HYPOXIA: Primary | ICD-10-CM

## 2020-12-22 DIAGNOSIS — R00.0 TACHYCARDIA: ICD-10-CM

## 2020-12-22 DIAGNOSIS — R41.82 ALTERED MENTAL STATUS: ICD-10-CM

## 2020-12-22 DIAGNOSIS — B34.2 CORONAVIRUS INFECTION: ICD-10-CM

## 2020-12-22 PROBLEM — A41.89 SEPSIS DUE TO COVID-19 (HCC): Status: ACTIVE | Noted: 2019-10-23

## 2020-12-22 PROBLEM — J96.01 ACUTE RESPIRATORY FAILURE WITH HYPOXIA (HCC): Status: ACTIVE | Noted: 2020-12-22

## 2020-12-22 PROBLEM — G93.40 ACUTE ENCEPHALOPATHY: Status: ACTIVE | Noted: 2020-12-22

## 2020-12-22 LAB
ALBUMIN SERPL BCP-MCNC: 2.8 G/DL (ref 3.5–5)
ALP SERPL-CCNC: 105 U/L (ref 46–116)
ALT SERPL W P-5'-P-CCNC: 18 U/L (ref 12–78)
ANION GAP SERPL CALCULATED.3IONS-SCNC: 10 MMOL/L (ref 4–13)
AST SERPL W P-5'-P-CCNC: 33 U/L (ref 5–45)
ATRIAL RATE: 115 BPM
BASE EX.OXY STD BLDV CALC-SCNC: 67.5 % (ref 60–80)
BASE EXCESS BLDV CALC-SCNC: -6.5 MMOL/L
BASOPHILS # BLD AUTO: 0.03 THOUSANDS/ΜL (ref 0–0.1)
BASOPHILS NFR BLD AUTO: 0 % (ref 0–1)
BILIRUB SERPL-MCNC: 0.47 MG/DL (ref 0.2–1)
BUN SERPL-MCNC: 39 MG/DL (ref 5–25)
CA-I BLD-SCNC: 1.07 MMOL/L (ref 1.12–1.32)
CALCIUM ALBUM COR SERPL-MCNC: 9.7 MG/DL (ref 8.3–10.1)
CALCIUM SERPL-MCNC: 8.7 MG/DL (ref 8.3–10.1)
CHLORIDE SERPL-SCNC: 101 MMOL/L (ref 100–108)
CK MB SERPL-MCNC: 2.2 NG/ML (ref 0–5)
CK MB SERPL-MCNC: <1 % (ref 0–2.5)
CK SERPL-CCNC: 234 U/L (ref 39–308)
CO2 SERPL-SCNC: 21 MMOL/L (ref 21–32)
CREAT SERPL-MCNC: 2.23 MG/DL (ref 0.6–1.3)
CRP SERPL QL: 151 MG/L
D DIMER PPP FEU-MCNC: 1.67 UG/ML FEU
EOSINOPHIL # BLD AUTO: 0.01 THOUSAND/ΜL (ref 0–0.61)
EOSINOPHIL NFR BLD AUTO: 0 % (ref 0–6)
ERYTHROCYTE [DISTWIDTH] IN BLOOD BY AUTOMATED COUNT: 13.2 % (ref 11.6–15.1)
FERRITIN SERPL-MCNC: 1017 NG/ML (ref 8–388)
FLUAV RNA RESP QL NAA+PROBE: NEGATIVE
FLUBV RNA RESP QL NAA+PROBE: NEGATIVE
GFR SERPL CREATININE-BSD FRML MDRD: 24 ML/MIN/1.73SQ M
GLUCOSE SERPL-MCNC: 98 MG/DL (ref 65–140)
HCO3 BLDV-SCNC: 19.1 MMOL/L (ref 24–30)
HCT VFR BLD AUTO: 33.6 % (ref 36.5–49.3)
HGB BLD-MCNC: 10.9 G/DL (ref 12–17)
IMM GRANULOCYTES # BLD AUTO: 0.06 THOUSAND/UL (ref 0–0.2)
IMM GRANULOCYTES NFR BLD AUTO: 1 % (ref 0–2)
INR PPP: 1 (ref 0.84–1.19)
L PNEUMO1 AG UR QL IA.RAPID: NEGATIVE
LACTATE SERPL-SCNC: 1.3 MMOL/L (ref 0.5–2)
LYMPHOCYTES # BLD AUTO: 0.72 THOUSANDS/ΜL (ref 0.6–4.47)
LYMPHOCYTES NFR BLD AUTO: 6 % (ref 14–44)
MAGNESIUM SERPL-MCNC: 1.2 MG/DL (ref 1.6–2.6)
MCH RBC QN AUTO: 29.8 PG (ref 26.8–34.3)
MCHC RBC AUTO-ENTMCNC: 32.4 G/DL (ref 31.4–37.4)
MCV RBC AUTO: 92 FL (ref 82–98)
MONOCYTES # BLD AUTO: 0.4 THOUSAND/ΜL (ref 0.17–1.22)
MONOCYTES NFR BLD AUTO: 3 % (ref 4–12)
NEUTROPHILS # BLD AUTO: 11.39 THOUSANDS/ΜL (ref 1.85–7.62)
NEUTS SEG NFR BLD AUTO: 90 % (ref 43–75)
NRBC BLD AUTO-RTO: 0 /100 WBCS
NT-PROBNP SERPL-MCNC: 486 PG/ML
O2 CT BLDV-SCNC: 10.8 ML/DL
PCO2 BLDV: 38.2 MM HG (ref 42–50)
PH BLDV: 7.32 [PH] (ref 7.3–7.4)
PLATELET # BLD AUTO: 262 THOUSANDS/UL (ref 149–390)
PMV BLD AUTO: 10.2 FL (ref 8.9–12.7)
PO2 BLDV: 39.1 MM HG (ref 35–45)
POTASSIUM SERPL-SCNC: 4.3 MMOL/L (ref 3.5–5.3)
PROCALCITONIN SERPL-MCNC: 2.83 NG/ML
PROT SERPL-MCNC: 7.4 G/DL (ref 6.4–8.2)
PROTHROMBIN TIME: 13.2 SECONDS (ref 11.6–14.5)
QRS AXIS: 71 DEGREES
QRSD INTERVAL: 104 MS
QT INTERVAL: 312 MS
QTC INTERVAL: 430 MS
RBC # BLD AUTO: 3.66 MILLION/UL (ref 3.88–5.62)
RSV RNA RESP QL NAA+PROBE: NEGATIVE
S PNEUM AG UR QL: NEGATIVE
SARS-COV-2 RNA RESP QL NAA+PROBE: POSITIVE
SODIUM SERPL-SCNC: 132 MMOL/L (ref 136–145)
T WAVE AXIS: 23 DEGREES
TRIGL SERPL-MCNC: 118 MG/DL
TROPONIN I SERPL-MCNC: <0.02 NG/ML
VENTRICULAR RATE: 114 BPM
WBC # BLD AUTO: 12.61 THOUSAND/UL (ref 4.31–10.16)

## 2020-12-22 PROCEDURE — 85610 PROTHROMBIN TIME: CPT | Performed by: EMERGENCY MEDICINE

## 2020-12-22 PROCEDURE — 93005 ELECTROCARDIOGRAM TRACING: CPT

## 2020-12-22 PROCEDURE — 36415 COLL VENOUS BLD VENIPUNCTURE: CPT | Performed by: EMERGENCY MEDICINE

## 2020-12-22 PROCEDURE — 82330 ASSAY OF CALCIUM: CPT | Performed by: EMERGENCY MEDICINE

## 2020-12-22 PROCEDURE — 85025 COMPLETE CBC W/AUTO DIFF WBC: CPT | Performed by: EMERGENCY MEDICINE

## 2020-12-22 PROCEDURE — 82955 ASSAY OF G6PD ENZYME: CPT | Performed by: EMERGENCY MEDICINE

## 2020-12-22 PROCEDURE — 85379 FIBRIN DEGRADATION QUANT: CPT | Performed by: INTERNAL MEDICINE

## 2020-12-22 PROCEDURE — 83520 IMMUNOASSAY QUANT NOS NONAB: CPT | Performed by: EMERGENCY MEDICINE

## 2020-12-22 PROCEDURE — 94760 N-INVAS EAR/PLS OXIMETRY 1: CPT

## 2020-12-22 PROCEDURE — 83880 ASSAY OF NATRIURETIC PEPTIDE: CPT | Performed by: EMERGENCY MEDICINE

## 2020-12-22 PROCEDURE — 96365 THER/PROPH/DIAG IV INF INIT: CPT

## 2020-12-22 PROCEDURE — 70450 CT HEAD/BRAIN W/O DYE: CPT

## 2020-12-22 PROCEDURE — 99291 CRITICAL CARE FIRST HOUR: CPT | Performed by: EMERGENCY MEDICINE

## 2020-12-22 PROCEDURE — 82553 CREATINE MB FRACTION: CPT | Performed by: EMERGENCY MEDICINE

## 2020-12-22 PROCEDURE — 87449 NOS EACH ORGANISM AG IA: CPT | Performed by: INTERNAL MEDICINE

## 2020-12-22 PROCEDURE — 84484 ASSAY OF TROPONIN QUANT: CPT | Performed by: EMERGENCY MEDICINE

## 2020-12-22 PROCEDURE — 0241U HB NFCT DS VIR RESP RNA 4 TRGT: CPT | Performed by: EMERGENCY MEDICINE

## 2020-12-22 PROCEDURE — 82550 ASSAY OF CK (CPK): CPT | Performed by: EMERGENCY MEDICINE

## 2020-12-22 PROCEDURE — 84145 PROCALCITONIN (PCT): CPT | Performed by: EMERGENCY MEDICINE

## 2020-12-22 PROCEDURE — 99223 1ST HOSP IP/OBS HIGH 75: CPT | Performed by: INTERNAL MEDICINE

## 2020-12-22 PROCEDURE — 96375 TX/PRO/DX INJ NEW DRUG ADDON: CPT

## 2020-12-22 PROCEDURE — 80053 COMPREHEN METABOLIC PANEL: CPT | Performed by: EMERGENCY MEDICINE

## 2020-12-22 PROCEDURE — 83735 ASSAY OF MAGNESIUM: CPT | Performed by: EMERGENCY MEDICINE

## 2020-12-22 PROCEDURE — 71045 X-RAY EXAM CHEST 1 VIEW: CPT

## 2020-12-22 PROCEDURE — 99232 SBSQ HOSP IP/OBS MODERATE 35: CPT | Performed by: NURSE PRACTITIONER

## 2020-12-22 PROCEDURE — 96367 TX/PROPH/DG ADDL SEQ IV INF: CPT

## 2020-12-22 PROCEDURE — 82728 ASSAY OF FERRITIN: CPT | Performed by: EMERGENCY MEDICINE

## 2020-12-22 PROCEDURE — 82805 BLOOD GASES W/O2 SATURATION: CPT | Performed by: EMERGENCY MEDICINE

## 2020-12-22 PROCEDURE — 87040 BLOOD CULTURE FOR BACTERIA: CPT | Performed by: EMERGENCY MEDICINE

## 2020-12-22 PROCEDURE — 99285 EMERGENCY DEPT VISIT HI MDM: CPT

## 2020-12-22 PROCEDURE — 84478 ASSAY OF TRIGLYCERIDES: CPT | Performed by: EMERGENCY MEDICINE

## 2020-12-22 PROCEDURE — 86140 C-REACTIVE PROTEIN: CPT | Performed by: EMERGENCY MEDICINE

## 2020-12-22 PROCEDURE — 83605 ASSAY OF LACTIC ACID: CPT | Performed by: EMERGENCY MEDICINE

## 2020-12-22 PROCEDURE — G1004 CDSM NDSC: HCPCS

## 2020-12-22 PROCEDURE — XW033E5 INTRODUCTION OF REMDESIVIR ANTI-INFECTIVE INTO PERIPHERAL VEIN, PERCUTANEOUS APPROACH, NEW TECHNOLOGY GROUP 5: ICD-10-PCS | Performed by: INTERNAL MEDICINE

## 2020-12-22 PROCEDURE — 93010 ELECTROCARDIOGRAM REPORT: CPT | Performed by: INTERNAL MEDICINE

## 2020-12-22 RX ORDER — ASCORBIC ACID 500 MG
1000 TABLET ORAL EVERY 12 HOURS SCHEDULED
Status: COMPLETED | OUTPATIENT
Start: 2020-12-22 | End: 2020-12-28

## 2020-12-22 RX ORDER — AMLODIPINE BESYLATE 10 MG/1
10 TABLET ORAL DAILY
Status: DISCONTINUED | OUTPATIENT
Start: 2020-12-22 | End: 2020-12-28 | Stop reason: HOSPADM

## 2020-12-22 RX ORDER — HEPARIN SODIUM 5000 [USP'U]/ML
5000 INJECTION, SOLUTION INTRAVENOUS; SUBCUTANEOUS EVERY 8 HOURS SCHEDULED
Status: DISCONTINUED | OUTPATIENT
Start: 2020-12-22 | End: 2020-12-28 | Stop reason: HOSPADM

## 2020-12-22 RX ORDER — ONDANSETRON 2 MG/ML
1 INJECTION INTRAMUSCULAR; INTRAVENOUS ONCE
Status: COMPLETED | OUTPATIENT
Start: 2020-12-22 | End: 2020-12-22

## 2020-12-22 RX ORDER — MELATONIN
2000 DAILY
Status: DISCONTINUED | OUTPATIENT
Start: 2020-12-22 | End: 2020-12-28 | Stop reason: HOSPADM

## 2020-12-22 RX ORDER — ASPIRIN 81 MG/1
81 TABLET ORAL DAILY
Status: DISCONTINUED | OUTPATIENT
Start: 2020-12-22 | End: 2020-12-28 | Stop reason: HOSPADM

## 2020-12-22 RX ORDER — MULTIVITAMIN/IRON/FOLIC ACID 18MG-0.4MG
1 TABLET ORAL DAILY
Status: DISCONTINUED | OUTPATIENT
Start: 2020-12-29 | End: 2020-12-28 | Stop reason: HOSPADM

## 2020-12-22 RX ORDER — SODIUM CHLORIDE, SODIUM LACTATE, POTASSIUM CHLORIDE, CALCIUM CHLORIDE 600; 310; 30; 20 MG/100ML; MG/100ML; MG/100ML; MG/100ML
100 INJECTION, SOLUTION INTRAVENOUS CONTINUOUS
Status: DISPENSED | OUTPATIENT
Start: 2020-12-22 | End: 2020-12-22

## 2020-12-22 RX ORDER — FLUTICASONE PROPIONATE 50 MCG
2 SPRAY, SUSPENSION (ML) NASAL DAILY
Status: DISCONTINUED | OUTPATIENT
Start: 2020-12-22 | End: 2020-12-28 | Stop reason: HOSPADM

## 2020-12-22 RX ORDER — FAMOTIDINE 20 MG/1
20 TABLET, FILM COATED ORAL DAILY
Status: DISCONTINUED | OUTPATIENT
Start: 2020-12-22 | End: 2020-12-28 | Stop reason: HOSPADM

## 2020-12-22 RX ORDER — ACETAMINOPHEN 650 MG/1
650 SUPPOSITORY RECTAL ONCE
Status: COMPLETED | OUTPATIENT
Start: 2020-12-22 | End: 2020-12-22

## 2020-12-22 RX ORDER — ZINC SULFATE 50(220)MG
220 CAPSULE ORAL DAILY
Status: DISCONTINUED | OUTPATIENT
Start: 2020-12-22 | End: 2020-12-28 | Stop reason: HOSPADM

## 2020-12-22 RX ORDER — ATORVASTATIN CALCIUM 40 MG/1
40 TABLET, FILM COATED ORAL
Status: DISCONTINUED | OUTPATIENT
Start: 2020-12-22 | End: 2020-12-28 | Stop reason: HOSPADM

## 2020-12-22 RX ORDER — ONDANSETRON 2 MG/ML
4 INJECTION INTRAMUSCULAR; INTRAVENOUS EVERY 6 HOURS PRN
Status: DISCONTINUED | OUTPATIENT
Start: 2020-12-22 | End: 2020-12-28 | Stop reason: HOSPADM

## 2020-12-22 RX ORDER — DEXAMETHASONE SODIUM PHOSPHATE 4 MG/ML
6 INJECTION, SOLUTION INTRA-ARTICULAR; INTRALESIONAL; INTRAMUSCULAR; INTRAVENOUS; SOFT TISSUE EVERY 24 HOURS
Status: DISCONTINUED | OUTPATIENT
Start: 2020-12-22 | End: 2020-12-28 | Stop reason: HOSPADM

## 2020-12-22 RX ORDER — FAMOTIDINE 20 MG/1
10 TABLET, FILM COATED ORAL DAILY
Status: DISCONTINUED | OUTPATIENT
Start: 2020-12-22 | End: 2020-12-22

## 2020-12-22 RX ORDER — FAMOTIDINE 20 MG/1
20 TABLET, FILM COATED ORAL 2 TIMES DAILY
Status: DISCONTINUED | OUTPATIENT
Start: 2020-12-22 | End: 2020-12-22

## 2020-12-22 RX ORDER — DOXYCYCLINE HYCLATE 100 MG/1
100 CAPSULE ORAL EVERY 12 HOURS
Status: DISCONTINUED | OUTPATIENT
Start: 2020-12-22 | End: 2020-12-22

## 2020-12-22 RX ADMIN — CEFTRIAXONE SODIUM 1000 MG: 10 INJECTION, POWDER, FOR SOLUTION INTRAVENOUS at 04:43

## 2020-12-22 RX ADMIN — FAMOTIDINE 20 MG: 20 TABLET ORAL at 09:48

## 2020-12-22 RX ADMIN — OXYCODONE HYDROCHLORIDE AND ACETAMINOPHEN 1000 MG: 500 TABLET ORAL at 09:48

## 2020-12-22 RX ADMIN — DOXYCYCLINE 100 MG: 100 INJECTION, POWDER, LYOPHILIZED, FOR SOLUTION INTRAVENOUS at 05:12

## 2020-12-22 RX ADMIN — REMDESIVIR 200 MG: 100 INJECTION, POWDER, LYOPHILIZED, FOR SOLUTION INTRAVENOUS at 09:48

## 2020-12-22 RX ADMIN — DEXAMETHASONE SODIUM PHOSPHATE 6 MG: 4 INJECTION, SOLUTION INTRA-ARTICULAR; INTRALESIONAL; INTRAMUSCULAR; INTRAVENOUS; SOFT TISSUE at 04:41

## 2020-12-22 RX ADMIN — OXYCODONE HYDROCHLORIDE AND ACETAMINOPHEN 1000 MG: 500 TABLET ORAL at 21:25

## 2020-12-22 RX ADMIN — HEPARIN SODIUM 5000 UNITS: 5000 INJECTION INTRAVENOUS; SUBCUTANEOUS at 21:25

## 2020-12-22 RX ADMIN — HEPARIN SODIUM 5000 UNITS: 5000 INJECTION INTRAVENOUS; SUBCUTANEOUS at 15:51

## 2020-12-22 RX ADMIN — SODIUM CHLORIDE, SODIUM LACTATE, POTASSIUM CHLORIDE, AND CALCIUM CHLORIDE 100 ML/HR: .6; .31; .03; .02 INJECTION, SOLUTION INTRAVENOUS at 09:39

## 2020-12-22 RX ADMIN — ACETAMINOPHEN 650 MG: 650 SUPPOSITORY RECTAL at 05:15

## 2020-12-22 RX ADMIN — ATORVASTATIN CALCIUM 40 MG: 40 TABLET, FILM COATED ORAL at 21:25

## 2020-12-22 RX ADMIN — HEPARIN SODIUM 5000 UNITS: 5000 INJECTION INTRAVENOUS; SUBCUTANEOUS at 09:48

## 2020-12-23 LAB
ALBUMIN SERPL BCP-MCNC: 2.4 G/DL (ref 3.5–5)
ALP SERPL-CCNC: 87 U/L (ref 46–116)
ALT SERPL W P-5'-P-CCNC: 17 U/L (ref 12–78)
ANION GAP SERPL CALCULATED.3IONS-SCNC: 9 MMOL/L (ref 4–13)
AST SERPL W P-5'-P-CCNC: 38 U/L (ref 5–45)
BASOPHILS # BLD AUTO: 0.02 THOUSANDS/ΜL (ref 0–0.1)
BASOPHILS NFR BLD AUTO: 0 % (ref 0–1)
BILIRUB SERPL-MCNC: 0.45 MG/DL (ref 0.2–1)
BUN SERPL-MCNC: 49 MG/DL (ref 5–25)
CALCIUM ALBUM COR SERPL-MCNC: 10.1 MG/DL (ref 8.3–10.1)
CALCIUM SERPL-MCNC: 8.8 MG/DL (ref 8.3–10.1)
CHLORIDE SERPL-SCNC: 105 MMOL/L (ref 100–108)
CO2 SERPL-SCNC: 19 MMOL/L (ref 21–32)
CREAT SERPL-MCNC: 2.08 MG/DL (ref 0.6–1.3)
CRP SERPL QL: 229 MG/L
D DIMER PPP FEU-MCNC: 1.45 UG/ML FEU
EOSINOPHIL # BLD AUTO: 0 THOUSAND/ΜL (ref 0–0.61)
EOSINOPHIL NFR BLD AUTO: 0 % (ref 0–6)
ERYTHROCYTE [DISTWIDTH] IN BLOOD BY AUTOMATED COUNT: 13.6 % (ref 11.6–15.1)
FERRITIN SERPL-MCNC: 879 NG/ML (ref 8–388)
G6PD BLD QN: 318 U/10E12 RBC (ref 127–427)
GFR SERPL CREATININE-BSD FRML MDRD: 27 ML/MIN/1.73SQ M
GLUCOSE SERPL-MCNC: 114 MG/DL (ref 65–140)
HCT VFR BLD AUTO: 30.9 % (ref 36.5–49.3)
HGB BLD-MCNC: 10.3 G/DL (ref 12–17)
IMM GRANULOCYTES # BLD AUTO: 0.16 THOUSAND/UL (ref 0–0.2)
IMM GRANULOCYTES NFR BLD AUTO: 1 % (ref 0–2)
LYMPHOCYTES # BLD AUTO: 0.78 THOUSANDS/ΜL (ref 0.6–4.47)
LYMPHOCYTES NFR BLD AUTO: 3 % (ref 14–44)
MCH RBC QN AUTO: 30.1 PG (ref 26.8–34.3)
MCHC RBC AUTO-ENTMCNC: 33.3 G/DL (ref 31.4–37.4)
MCV RBC AUTO: 90 FL (ref 82–98)
MONOCYTES # BLD AUTO: 0.44 THOUSAND/ΜL (ref 0.17–1.22)
MONOCYTES NFR BLD AUTO: 2 % (ref 4–12)
NEUTROPHILS # BLD AUTO: 21.54 THOUSANDS/ΜL (ref 1.85–7.62)
NEUTS SEG NFR BLD AUTO: 94 % (ref 43–75)
NRBC BLD AUTO-RTO: 0 /100 WBCS
PLATELET # BLD AUTO: 254 THOUSANDS/UL (ref 149–390)
PMV BLD AUTO: 10.1 FL (ref 8.9–12.7)
POTASSIUM SERPL-SCNC: 4.3 MMOL/L (ref 3.5–5.3)
PROCALCITONIN SERPL-MCNC: 15.14 NG/ML
PROT SERPL-MCNC: 6.9 G/DL (ref 6.4–8.2)
RBC # BLD AUTO: 3.42 MILLION/UL (ref 3.88–5.62)
RBC # BLD AUTO: 3.56 X10E6/UL (ref 4.14–5.8)
SODIUM SERPL-SCNC: 133 MMOL/L (ref 136–145)
WBC # BLD AUTO: 22.94 THOUSAND/UL (ref 4.31–10.16)

## 2020-12-23 PROCEDURE — 85025 COMPLETE CBC W/AUTO DIFF WBC: CPT | Performed by: INTERNAL MEDICINE

## 2020-12-23 PROCEDURE — 80053 COMPREHEN METABOLIC PANEL: CPT | Performed by: INTERNAL MEDICINE

## 2020-12-23 PROCEDURE — 84145 PROCALCITONIN (PCT): CPT | Performed by: INTERNAL MEDICINE

## 2020-12-23 PROCEDURE — 85379 FIBRIN DEGRADATION QUANT: CPT | Performed by: INTERNAL MEDICINE

## 2020-12-23 PROCEDURE — 86140 C-REACTIVE PROTEIN: CPT | Performed by: INTERNAL MEDICINE

## 2020-12-23 PROCEDURE — 99232 SBSQ HOSP IP/OBS MODERATE 35: CPT | Performed by: NURSE PRACTITIONER

## 2020-12-23 PROCEDURE — 82728 ASSAY OF FERRITIN: CPT | Performed by: INTERNAL MEDICINE

## 2020-12-23 PROCEDURE — 94760 N-INVAS EAR/PLS OXIMETRY 1: CPT

## 2020-12-23 RX ORDER — SODIUM CHLORIDE 9 MG/ML
75 INJECTION, SOLUTION INTRAVENOUS ONCE
Status: COMPLETED | OUTPATIENT
Start: 2020-12-23 | End: 2020-12-23

## 2020-12-23 RX ORDER — MUSCLE RUB CREAM 100; 150 MG/G; MG/G
CREAM TOPICAL 4 TIMES DAILY PRN
Status: DISCONTINUED | OUTPATIENT
Start: 2020-12-23 | End: 2020-12-28 | Stop reason: HOSPADM

## 2020-12-23 RX ADMIN — ZINC SULFATE 220 MG (50 MG) CAPSULE 220 MG: CAPSULE at 09:16

## 2020-12-23 RX ADMIN — OXYCODONE HYDROCHLORIDE AND ACETAMINOPHEN 1000 MG: 500 TABLET ORAL at 21:53

## 2020-12-23 RX ADMIN — FLUTICASONE PROPIONATE 2 SPRAY: 50 SPRAY, METERED NASAL at 12:26

## 2020-12-23 RX ADMIN — HEPARIN SODIUM 5000 UNITS: 5000 INJECTION INTRAVENOUS; SUBCUTANEOUS at 21:53

## 2020-12-23 RX ADMIN — FAMOTIDINE 20 MG: 20 TABLET ORAL at 09:16

## 2020-12-23 RX ADMIN — OXYCODONE HYDROCHLORIDE AND ACETAMINOPHEN 1000 MG: 500 TABLET ORAL at 09:17

## 2020-12-23 RX ADMIN — SODIUM CHLORIDE 75 ML/HR: 0.9 INJECTION, SOLUTION INTRAVENOUS at 10:30

## 2020-12-23 RX ADMIN — HEPARIN SODIUM 5000 UNITS: 5000 INJECTION INTRAVENOUS; SUBCUTANEOUS at 05:21

## 2020-12-23 RX ADMIN — ASPIRIN 81 MG: 81 TABLET ORAL at 09:16

## 2020-12-23 RX ADMIN — AMLODIPINE BESYLATE 10 MG: 10 TABLET ORAL at 09:17

## 2020-12-23 RX ADMIN — DEXAMETHASONE SODIUM PHOSPHATE 6 MG: 4 INJECTION, SOLUTION INTRA-ARTICULAR; INTRALESIONAL; INTRAMUSCULAR; INTRAVENOUS; SOFT TISSUE at 03:24

## 2020-12-23 RX ADMIN — MENTHOL, METHYL SALICYLATE 1 APPLICATION: 10; 15 CREAM TOPICAL at 17:12

## 2020-12-23 RX ADMIN — HEPARIN SODIUM 5000 UNITS: 5000 INJECTION INTRAVENOUS; SUBCUTANEOUS at 14:19

## 2020-12-23 RX ADMIN — Medication 2000 UNITS: at 09:17

## 2020-12-23 RX ADMIN — REMDESIVIR 100 MG: 100 INJECTION, POWDER, LYOPHILIZED, FOR SOLUTION INTRAVENOUS at 09:17

## 2020-12-23 RX ADMIN — CEFTRIAXONE SODIUM 1000 MG: 10 INJECTION, POWDER, FOR SOLUTION INTRAVENOUS at 03:24

## 2020-12-23 RX ADMIN — ATORVASTATIN CALCIUM 40 MG: 40 TABLET, FILM COATED ORAL at 21:54

## 2020-12-24 LAB
ANION GAP SERPL CALCULATED.3IONS-SCNC: 10 MMOL/L (ref 4–13)
BASOPHILS # BLD AUTO: 0.01 THOUSANDS/ΜL (ref 0–0.1)
BASOPHILS NFR BLD AUTO: 0 % (ref 0–1)
BUN SERPL-MCNC: 49 MG/DL (ref 5–25)
CALCIUM SERPL-MCNC: 9.4 MG/DL (ref 8.3–10.1)
CHLORIDE SERPL-SCNC: 105 MMOL/L (ref 100–108)
CO2 SERPL-SCNC: 19 MMOL/L (ref 21–32)
CREAT SERPL-MCNC: 1.94 MG/DL (ref 0.6–1.3)
EOSINOPHIL # BLD AUTO: 0 THOUSAND/ΜL (ref 0–0.61)
EOSINOPHIL NFR BLD AUTO: 0 % (ref 0–6)
ERYTHROCYTE [DISTWIDTH] IN BLOOD BY AUTOMATED COUNT: 13.6 % (ref 11.6–15.1)
GFR SERPL CREATININE-BSD FRML MDRD: 29 ML/MIN/1.73SQ M
GLUCOSE SERPL-MCNC: 112 MG/DL (ref 65–140)
GLUCOSE SERPL-MCNC: 92 MG/DL (ref 65–140)
HCT VFR BLD AUTO: 30.4 % (ref 36.5–49.3)
HGB BLD-MCNC: 10.1 G/DL (ref 12–17)
IL6 SERPL-MCNC: 825.5 PG/ML (ref 0–13)
IMM GRANULOCYTES # BLD AUTO: 0.14 THOUSAND/UL (ref 0–0.2)
IMM GRANULOCYTES NFR BLD AUTO: 1 % (ref 0–2)
LYMPHOCYTES # BLD AUTO: 0.67 THOUSANDS/ΜL (ref 0.6–4.47)
LYMPHOCYTES NFR BLD AUTO: 5 % (ref 14–44)
MAGNESIUM SERPL-MCNC: 1.7 MG/DL (ref 1.6–2.6)
MCH RBC QN AUTO: 30 PG (ref 26.8–34.3)
MCHC RBC AUTO-ENTMCNC: 33.2 G/DL (ref 31.4–37.4)
MCV RBC AUTO: 90 FL (ref 82–98)
MONOCYTES # BLD AUTO: 0.51 THOUSAND/ΜL (ref 0.17–1.22)
MONOCYTES NFR BLD AUTO: 4 % (ref 4–12)
NEUTROPHILS # BLD AUTO: 12.97 THOUSANDS/ΜL (ref 1.85–7.62)
NEUTS SEG NFR BLD AUTO: 90 % (ref 43–75)
NRBC BLD AUTO-RTO: 0 /100 WBCS
PLATELET # BLD AUTO: 357 THOUSANDS/UL (ref 149–390)
PMV BLD AUTO: 10.1 FL (ref 8.9–12.7)
POTASSIUM SERPL-SCNC: 4.4 MMOL/L (ref 3.5–5.3)
PROCALCITONIN SERPL-MCNC: 8.67 NG/ML
RBC # BLD AUTO: 3.37 MILLION/UL (ref 3.88–5.62)
SODIUM SERPL-SCNC: 134 MMOL/L (ref 136–145)
WBC # BLD AUTO: 14.3 THOUSAND/UL (ref 4.31–10.16)

## 2020-12-24 PROCEDURE — 94760 N-INVAS EAR/PLS OXIMETRY 1: CPT

## 2020-12-24 PROCEDURE — 80048 BASIC METABOLIC PNL TOTAL CA: CPT | Performed by: NURSE PRACTITIONER

## 2020-12-24 PROCEDURE — 85025 COMPLETE CBC W/AUTO DIFF WBC: CPT | Performed by: NURSE PRACTITIONER

## 2020-12-24 PROCEDURE — 82948 REAGENT STRIP/BLOOD GLUCOSE: CPT

## 2020-12-24 PROCEDURE — 99232 SBSQ HOSP IP/OBS MODERATE 35: CPT | Performed by: PHYSICIAN ASSISTANT

## 2020-12-24 PROCEDURE — 83735 ASSAY OF MAGNESIUM: CPT | Performed by: NURSE PRACTITIONER

## 2020-12-24 PROCEDURE — 84145 PROCALCITONIN (PCT): CPT | Performed by: NURSE PRACTITIONER

## 2020-12-24 RX ORDER — LANOLIN ALCOHOL/MO/W.PET/CERES
3 CREAM (GRAM) TOPICAL
Status: DISCONTINUED | OUTPATIENT
Start: 2020-12-24 | End: 2020-12-28 | Stop reason: HOSPADM

## 2020-12-24 RX ADMIN — CEFTRIAXONE SODIUM 1000 MG: 10 INJECTION, POWDER, FOR SOLUTION INTRAVENOUS at 03:34

## 2020-12-24 RX ADMIN — MELATONIN 3 MG: at 01:32

## 2020-12-24 RX ADMIN — ASPIRIN 81 MG: 81 TABLET ORAL at 09:40

## 2020-12-24 RX ADMIN — OXYCODONE HYDROCHLORIDE AND ACETAMINOPHEN 1000 MG: 500 TABLET ORAL at 21:23

## 2020-12-24 RX ADMIN — MELATONIN 3 MG: at 21:23

## 2020-12-24 RX ADMIN — HEPARIN SODIUM 5000 UNITS: 5000 INJECTION INTRAVENOUS; SUBCUTANEOUS at 06:09

## 2020-12-24 RX ADMIN — MENTHOL, METHYL SALICYLATE 1 APPLICATION: 10; 15 CREAM TOPICAL at 07:41

## 2020-12-24 RX ADMIN — Medication 2000 UNITS: at 09:40

## 2020-12-24 RX ADMIN — HEPARIN SODIUM 5000 UNITS: 5000 INJECTION INTRAVENOUS; SUBCUTANEOUS at 21:23

## 2020-12-24 RX ADMIN — FAMOTIDINE 20 MG: 20 TABLET ORAL at 09:40

## 2020-12-24 RX ADMIN — OXYCODONE HYDROCHLORIDE AND ACETAMINOPHEN 1000 MG: 500 TABLET ORAL at 09:40

## 2020-12-24 RX ADMIN — FLUTICASONE PROPIONATE 2 SPRAY: 50 SPRAY, METERED NASAL at 09:41

## 2020-12-24 RX ADMIN — HEPARIN SODIUM 5000 UNITS: 5000 INJECTION INTRAVENOUS; SUBCUTANEOUS at 17:35

## 2020-12-24 RX ADMIN — ZINC SULFATE 220 MG (50 MG) CAPSULE 220 MG: CAPSULE at 09:40

## 2020-12-24 RX ADMIN — REMDESIVIR 100 MG: 100 INJECTION, POWDER, LYOPHILIZED, FOR SOLUTION INTRAVENOUS at 09:42

## 2020-12-24 RX ADMIN — DEXAMETHASONE SODIUM PHOSPHATE 6 MG: 4 INJECTION, SOLUTION INTRA-ARTICULAR; INTRALESIONAL; INTRAMUSCULAR; INTRAVENOUS; SOFT TISSUE at 03:34

## 2020-12-24 RX ADMIN — AMLODIPINE BESYLATE 10 MG: 10 TABLET ORAL at 09:40

## 2020-12-24 RX ADMIN — ATORVASTATIN CALCIUM 40 MG: 40 TABLET, FILM COATED ORAL at 21:23

## 2020-12-25 LAB
ALBUMIN SERPL BCP-MCNC: 2.6 G/DL (ref 3.5–5)
ALP SERPL-CCNC: 82 U/L (ref 46–116)
ALT SERPL W P-5'-P-CCNC: 22 U/L (ref 12–78)
ANION GAP SERPL CALCULATED.3IONS-SCNC: 11 MMOL/L (ref 4–13)
AST SERPL W P-5'-P-CCNC: 47 U/L (ref 5–45)
BASOPHILS # BLD MANUAL: 0 THOUSAND/UL (ref 0–0.1)
BASOPHILS NFR MAR MANUAL: 0 % (ref 0–1)
BILIRUB SERPL-MCNC: 0.32 MG/DL (ref 0.2–1)
BUN SERPL-MCNC: 49 MG/DL (ref 5–25)
BURR CELLS BLD QL SMEAR: PRESENT
CALCIUM ALBUM COR SERPL-MCNC: 10.4 MG/DL (ref 8.3–10.1)
CALCIUM SERPL-MCNC: 9.3 MG/DL (ref 8.3–10.1)
CHLORIDE SERPL-SCNC: 102 MMOL/L (ref 100–108)
CO2 SERPL-SCNC: 20 MMOL/L (ref 21–32)
CREAT SERPL-MCNC: 1.92 MG/DL (ref 0.6–1.3)
CRP SERPL QL: 85.3 MG/L
D DIMER PPP FEU-MCNC: 0.69 UG/ML FEU
EOSINOPHIL # BLD MANUAL: 0 THOUSAND/UL (ref 0–0.4)
EOSINOPHIL NFR BLD MANUAL: 0 % (ref 0–6)
ERYTHROCYTE [DISTWIDTH] IN BLOOD BY AUTOMATED COUNT: 13.5 % (ref 11.6–15.1)
FERRITIN SERPL-MCNC: 774 NG/ML (ref 8–388)
GFR SERPL CREATININE-BSD FRML MDRD: 29 ML/MIN/1.73SQ M
GLUCOSE SERPL-MCNC: 119 MG/DL (ref 65–140)
HCT VFR BLD AUTO: 30.6 % (ref 36.5–49.3)
HGB BLD-MCNC: 10.4 G/DL (ref 12–17)
LYMPHOCYTES # BLD AUTO: 0.53 THOUSAND/UL (ref 0.6–4.47)
LYMPHOCYTES # BLD AUTO: 5 % (ref 14–44)
MCH RBC QN AUTO: 30.3 PG (ref 26.8–34.3)
MCHC RBC AUTO-ENTMCNC: 34 G/DL (ref 31.4–37.4)
MCV RBC AUTO: 89 FL (ref 82–98)
MONOCYTES # BLD AUTO: 0.84 THOUSAND/UL (ref 0–1.22)
MONOCYTES NFR BLD: 8 % (ref 4–12)
NEUTROPHILS # BLD MANUAL: 8.94 THOUSAND/UL (ref 1.85–7.62)
NEUTS SEG NFR BLD AUTO: 85 % (ref 43–75)
NRBC BLD AUTO-RTO: 0 /100 WBCS
PLATELET # BLD AUTO: 316 THOUSANDS/UL (ref 149–390)
PLATELET BLD QL SMEAR: ADEQUATE
PMV BLD AUTO: 10.6 FL (ref 8.9–12.7)
POIKILOCYTOSIS BLD QL SMEAR: PRESENT
POLYCHROMASIA BLD QL SMEAR: PRESENT
POTASSIUM SERPL-SCNC: 4.1 MMOL/L (ref 3.5–5.3)
PROCALCITONIN SERPL-MCNC: 5.55 NG/ML
PROT SERPL-MCNC: 6.9 G/DL (ref 6.4–8.2)
RBC # BLD AUTO: 3.43 MILLION/UL (ref 3.88–5.62)
RBC MORPH BLD: PRESENT
SODIUM SERPL-SCNC: 133 MMOL/L (ref 136–145)
VARIANT LYMPHS # BLD AUTO: 2 %
WBC # BLD AUTO: 10.52 THOUSAND/UL (ref 4.31–10.16)

## 2020-12-25 PROCEDURE — 85007 BL SMEAR W/DIFF WBC COUNT: CPT | Performed by: PHYSICIAN ASSISTANT

## 2020-12-25 PROCEDURE — 85379 FIBRIN DEGRADATION QUANT: CPT | Performed by: PHYSICIAN ASSISTANT

## 2020-12-25 PROCEDURE — 84145 PROCALCITONIN (PCT): CPT | Performed by: NURSE PRACTITIONER

## 2020-12-25 PROCEDURE — 85027 COMPLETE CBC AUTOMATED: CPT | Performed by: PHYSICIAN ASSISTANT

## 2020-12-25 PROCEDURE — 80053 COMPREHEN METABOLIC PANEL: CPT | Performed by: PHYSICIAN ASSISTANT

## 2020-12-25 PROCEDURE — 99232 SBSQ HOSP IP/OBS MODERATE 35: CPT | Performed by: PHYSICIAN ASSISTANT

## 2020-12-25 PROCEDURE — 94760 N-INVAS EAR/PLS OXIMETRY 1: CPT

## 2020-12-25 PROCEDURE — 86140 C-REACTIVE PROTEIN: CPT | Performed by: NURSE PRACTITIONER

## 2020-12-25 PROCEDURE — 82728 ASSAY OF FERRITIN: CPT | Performed by: NURSE PRACTITIONER

## 2020-12-25 RX ADMIN — MELATONIN 3 MG: at 20:58

## 2020-12-25 RX ADMIN — HEPARIN SODIUM 5000 UNITS: 5000 INJECTION INTRAVENOUS; SUBCUTANEOUS at 06:12

## 2020-12-25 RX ADMIN — ZINC SULFATE 220 MG (50 MG) CAPSULE 220 MG: CAPSULE at 09:18

## 2020-12-25 RX ADMIN — Medication 2000 UNITS: at 09:18

## 2020-12-25 RX ADMIN — HEPARIN SODIUM 5000 UNITS: 5000 INJECTION INTRAVENOUS; SUBCUTANEOUS at 20:58

## 2020-12-25 RX ADMIN — ASPIRIN 81 MG: 81 TABLET ORAL at 09:18

## 2020-12-25 RX ADMIN — FAMOTIDINE 20 MG: 20 TABLET ORAL at 09:18

## 2020-12-25 RX ADMIN — OXYCODONE HYDROCHLORIDE AND ACETAMINOPHEN 1000 MG: 500 TABLET ORAL at 09:18

## 2020-12-25 RX ADMIN — AMLODIPINE BESYLATE 10 MG: 10 TABLET ORAL at 09:33

## 2020-12-25 RX ADMIN — HEPARIN SODIUM 5000 UNITS: 5000 INJECTION INTRAVENOUS; SUBCUTANEOUS at 15:00

## 2020-12-25 RX ADMIN — FLUTICASONE PROPIONATE 2 SPRAY: 50 SPRAY, METERED NASAL at 09:34

## 2020-12-25 RX ADMIN — ATORVASTATIN CALCIUM 40 MG: 40 TABLET, FILM COATED ORAL at 20:59

## 2020-12-25 RX ADMIN — CEFTRIAXONE SODIUM 1000 MG: 10 INJECTION, POWDER, FOR SOLUTION INTRAVENOUS at 04:21

## 2020-12-25 RX ADMIN — REMDESIVIR 100 MG: 100 INJECTION, POWDER, LYOPHILIZED, FOR SOLUTION INTRAVENOUS at 10:40

## 2020-12-25 RX ADMIN — DEXAMETHASONE SODIUM PHOSPHATE 6 MG: 4 INJECTION, SOLUTION INTRA-ARTICULAR; INTRALESIONAL; INTRAMUSCULAR; INTRAVENOUS; SOFT TISSUE at 04:21

## 2020-12-25 RX ADMIN — OXYCODONE HYDROCHLORIDE AND ACETAMINOPHEN 1000 MG: 500 TABLET ORAL at 20:59

## 2020-12-26 LAB
ALBUMIN SERPL BCP-MCNC: 2.7 G/DL (ref 3.5–5)
ALP SERPL-CCNC: 81 U/L (ref 46–116)
ALT SERPL W P-5'-P-CCNC: 24 U/L (ref 12–78)
ANION GAP SERPL CALCULATED.3IONS-SCNC: 9 MMOL/L (ref 4–13)
AST SERPL W P-5'-P-CCNC: 40 U/L (ref 5–45)
BILIRUB SERPL-MCNC: 0.18 MG/DL (ref 0.2–1)
BUN SERPL-MCNC: 52 MG/DL (ref 5–25)
CALCIUM ALBUM COR SERPL-MCNC: 9.8 MG/DL (ref 8.3–10.1)
CALCIUM SERPL-MCNC: 8.8 MG/DL (ref 8.3–10.1)
CHLORIDE SERPL-SCNC: 105 MMOL/L (ref 100–108)
CO2 SERPL-SCNC: 21 MMOL/L (ref 21–32)
CREAT SERPL-MCNC: 1.89 MG/DL (ref 0.6–1.3)
CRP SERPL QL: 47.1 MG/L
D DIMER PPP FEU-MCNC: 0.49 UG/ML FEU
ERYTHROCYTE [DISTWIDTH] IN BLOOD BY AUTOMATED COUNT: 13.4 % (ref 11.6–15.1)
FERRITIN SERPL-MCNC: 671 NG/ML (ref 8–388)
GFR SERPL CREATININE-BSD FRML MDRD: 30 ML/MIN/1.73SQ M
GLUCOSE SERPL-MCNC: 102 MG/DL (ref 65–140)
HCT VFR BLD AUTO: 30.9 % (ref 36.5–49.3)
HGB BLD-MCNC: 10.5 G/DL (ref 12–17)
MCH RBC QN AUTO: 30.3 PG (ref 26.8–34.3)
MCHC RBC AUTO-ENTMCNC: 34 G/DL (ref 31.4–37.4)
MCV RBC AUTO: 89 FL (ref 82–98)
NRBC BLD AUTO-RTO: 0 /100 WBCS
PLATELET # BLD AUTO: 335 THOUSANDS/UL (ref 149–390)
PMV BLD AUTO: 10.8 FL (ref 8.9–12.7)
POTASSIUM SERPL-SCNC: 4.1 MMOL/L (ref 3.5–5.3)
PROCALCITONIN SERPL-MCNC: 2.28 NG/ML
PROT SERPL-MCNC: 6.6 G/DL (ref 6.4–8.2)
RBC # BLD AUTO: 3.46 MILLION/UL (ref 3.88–5.62)
SODIUM SERPL-SCNC: 135 MMOL/L (ref 136–145)
WBC # BLD AUTO: 10.68 THOUSAND/UL (ref 4.31–10.16)

## 2020-12-26 PROCEDURE — 85027 COMPLETE CBC AUTOMATED: CPT | Performed by: PHYSICIAN ASSISTANT

## 2020-12-26 PROCEDURE — 84145 PROCALCITONIN (PCT): CPT | Performed by: PHYSICIAN ASSISTANT

## 2020-12-26 PROCEDURE — 99232 SBSQ HOSP IP/OBS MODERATE 35: CPT | Performed by: PHYSICIAN ASSISTANT

## 2020-12-26 PROCEDURE — 85379 FIBRIN DEGRADATION QUANT: CPT | Performed by: PHYSICIAN ASSISTANT

## 2020-12-26 PROCEDURE — 80053 COMPREHEN METABOLIC PANEL: CPT | Performed by: PHYSICIAN ASSISTANT

## 2020-12-26 PROCEDURE — 86140 C-REACTIVE PROTEIN: CPT | Performed by: PHYSICIAN ASSISTANT

## 2020-12-26 PROCEDURE — 82728 ASSAY OF FERRITIN: CPT | Performed by: PHYSICIAN ASSISTANT

## 2020-12-26 RX ORDER — OLANZAPINE 5 MG/1
2.5 TABLET, ORALLY DISINTEGRATING ORAL ONCE AS NEEDED
Status: DISCONTINUED | OUTPATIENT
Start: 2020-12-26 | End: 2020-12-28 | Stop reason: HOSPADM

## 2020-12-26 RX ADMIN — MELATONIN 3 MG: at 21:02

## 2020-12-26 RX ADMIN — MENTHOL, METHYL SALICYLATE: 10; 15 CREAM TOPICAL at 21:24

## 2020-12-26 RX ADMIN — ATORVASTATIN CALCIUM 40 MG: 40 TABLET, FILM COATED ORAL at 21:02

## 2020-12-26 RX ADMIN — FLUTICASONE PROPIONATE 2 SPRAY: 50 SPRAY, METERED NASAL at 09:18

## 2020-12-26 RX ADMIN — CEFTRIAXONE SODIUM 1000 MG: 10 INJECTION, POWDER, FOR SOLUTION INTRAVENOUS at 04:57

## 2020-12-26 RX ADMIN — OXYCODONE HYDROCHLORIDE AND ACETAMINOPHEN 1000 MG: 500 TABLET ORAL at 09:17

## 2020-12-26 RX ADMIN — REMDESIVIR 100 MG: 100 INJECTION, POWDER, LYOPHILIZED, FOR SOLUTION INTRAVENOUS at 09:18

## 2020-12-26 RX ADMIN — ASPIRIN 81 MG: 81 TABLET ORAL at 09:17

## 2020-12-26 RX ADMIN — HEPARIN SODIUM 5000 UNITS: 5000 INJECTION INTRAVENOUS; SUBCUTANEOUS at 21:02

## 2020-12-26 RX ADMIN — OXYCODONE HYDROCHLORIDE AND ACETAMINOPHEN 1000 MG: 500 TABLET ORAL at 21:02

## 2020-12-26 RX ADMIN — MENTHOL, METHYL SALICYLATE: 10; 15 CREAM TOPICAL at 09:18

## 2020-12-26 RX ADMIN — Medication 2000 UNITS: at 09:17

## 2020-12-26 RX ADMIN — DEXAMETHASONE SODIUM PHOSPHATE 6 MG: 4 INJECTION, SOLUTION INTRA-ARTICULAR; INTRALESIONAL; INTRAMUSCULAR; INTRAVENOUS; SOFT TISSUE at 04:58

## 2020-12-26 RX ADMIN — HEPARIN SODIUM 5000 UNITS: 5000 INJECTION INTRAVENOUS; SUBCUTANEOUS at 04:58

## 2020-12-26 RX ADMIN — FAMOTIDINE 20 MG: 20 TABLET ORAL at 09:17

## 2020-12-26 RX ADMIN — HEPARIN SODIUM 5000 UNITS: 5000 INJECTION INTRAVENOUS; SUBCUTANEOUS at 14:35

## 2020-12-26 RX ADMIN — AMLODIPINE BESYLATE 10 MG: 10 TABLET ORAL at 09:17

## 2020-12-26 RX ADMIN — ZINC SULFATE 220 MG (50 MG) CAPSULE 220 MG: CAPSULE at 09:17

## 2020-12-27 LAB
ALBUMIN SERPL BCP-MCNC: 2.6 G/DL (ref 3.5–5)
ALP SERPL-CCNC: 82 U/L (ref 46–116)
ALT SERPL W P-5'-P-CCNC: 27 U/L (ref 12–78)
ANION GAP SERPL CALCULATED.3IONS-SCNC: 9 MMOL/L (ref 4–13)
AST SERPL W P-5'-P-CCNC: 36 U/L (ref 5–45)
BACTERIA BLD CULT: NORMAL
BACTERIA BLD CULT: NORMAL
BILIRUB SERPL-MCNC: 0.3 MG/DL (ref 0.2–1)
BUN SERPL-MCNC: 50 MG/DL (ref 5–25)
CALCIUM ALBUM COR SERPL-MCNC: 10 MG/DL (ref 8.3–10.1)
CALCIUM SERPL-MCNC: 8.9 MG/DL (ref 8.3–10.1)
CHLORIDE SERPL-SCNC: 105 MMOL/L (ref 100–108)
CO2 SERPL-SCNC: 21 MMOL/L (ref 21–32)
CREAT SERPL-MCNC: 1.81 MG/DL (ref 0.6–1.3)
ERYTHROCYTE [DISTWIDTH] IN BLOOD BY AUTOMATED COUNT: 13.4 % (ref 11.6–15.1)
GFR SERPL CREATININE-BSD FRML MDRD: 32 ML/MIN/1.73SQ M
GLUCOSE SERPL-MCNC: 100 MG/DL (ref 65–140)
HCT VFR BLD AUTO: 32.6 % (ref 36.5–49.3)
HGB BLD-MCNC: 10.8 G/DL (ref 12–17)
MCH RBC QN AUTO: 29.5 PG (ref 26.8–34.3)
MCHC RBC AUTO-ENTMCNC: 33.1 G/DL (ref 31.4–37.4)
MCV RBC AUTO: 89 FL (ref 82–98)
NRBC BLD AUTO-RTO: 0 /100 WBCS
PLATELET # BLD AUTO: 339 THOUSANDS/UL (ref 149–390)
PMV BLD AUTO: 10.8 FL (ref 8.9–12.7)
POTASSIUM SERPL-SCNC: 3.9 MMOL/L (ref 3.5–5.3)
PROCALCITONIN SERPL-MCNC: 0.89 NG/ML
PROT SERPL-MCNC: 6.8 G/DL (ref 6.4–8.2)
RBC # BLD AUTO: 3.66 MILLION/UL (ref 3.88–5.62)
SODIUM SERPL-SCNC: 135 MMOL/L (ref 136–145)
WBC # BLD AUTO: 14.47 THOUSAND/UL (ref 4.31–10.16)

## 2020-12-27 PROCEDURE — 84145 PROCALCITONIN (PCT): CPT | Performed by: PHYSICIAN ASSISTANT

## 2020-12-27 PROCEDURE — 99232 SBSQ HOSP IP/OBS MODERATE 35: CPT | Performed by: PHYSICIAN ASSISTANT

## 2020-12-27 PROCEDURE — 80053 COMPREHEN METABOLIC PANEL: CPT | Performed by: PHYSICIAN ASSISTANT

## 2020-12-27 PROCEDURE — 85027 COMPLETE CBC AUTOMATED: CPT | Performed by: PHYSICIAN ASSISTANT

## 2020-12-27 RX ADMIN — DEXAMETHASONE SODIUM PHOSPHATE 6 MG: 4 INJECTION, SOLUTION INTRA-ARTICULAR; INTRALESIONAL; INTRAMUSCULAR; INTRAVENOUS; SOFT TISSUE at 03:32

## 2020-12-27 RX ADMIN — HEPARIN SODIUM 5000 UNITS: 5000 INJECTION INTRAVENOUS; SUBCUTANEOUS at 05:03

## 2020-12-27 RX ADMIN — Medication 2000 UNITS: at 09:52

## 2020-12-27 RX ADMIN — AMLODIPINE BESYLATE 10 MG: 10 TABLET ORAL at 09:51

## 2020-12-27 RX ADMIN — ZINC SULFATE 220 MG (50 MG) CAPSULE 220 MG: CAPSULE at 09:51

## 2020-12-27 RX ADMIN — HEPARIN SODIUM 5000 UNITS: 5000 INJECTION INTRAVENOUS; SUBCUTANEOUS at 21:45

## 2020-12-27 RX ADMIN — OXYCODONE HYDROCHLORIDE AND ACETAMINOPHEN 1000 MG: 500 TABLET ORAL at 21:45

## 2020-12-27 RX ADMIN — ASPIRIN 81 MG: 81 TABLET ORAL at 09:50

## 2020-12-27 RX ADMIN — CEFTRIAXONE SODIUM 1000 MG: 10 INJECTION, POWDER, FOR SOLUTION INTRAVENOUS at 03:32

## 2020-12-27 RX ADMIN — OXYCODONE HYDROCHLORIDE AND ACETAMINOPHEN 1000 MG: 500 TABLET ORAL at 09:51

## 2020-12-27 RX ADMIN — ATORVASTATIN CALCIUM 40 MG: 40 TABLET, FILM COATED ORAL at 21:50

## 2020-12-27 RX ADMIN — MENTHOL, METHYL SALICYLATE: 10; 15 CREAM TOPICAL at 21:46

## 2020-12-27 RX ADMIN — HEPARIN SODIUM 5000 UNITS: 5000 INJECTION INTRAVENOUS; SUBCUTANEOUS at 14:39

## 2020-12-27 RX ADMIN — FAMOTIDINE 20 MG: 20 TABLET ORAL at 09:50

## 2020-12-27 RX ADMIN — MELATONIN 3 MG: at 21:45

## 2020-12-27 RX ADMIN — FLUTICASONE PROPIONATE 2 SPRAY: 50 SPRAY, METERED NASAL at 09:54

## 2020-12-28 VITALS
SYSTOLIC BLOOD PRESSURE: 137 MMHG | WEIGHT: 145 LBS | BODY MASS INDEX: 21.98 KG/M2 | HEIGHT: 68 IN | DIASTOLIC BLOOD PRESSURE: 77 MMHG | OXYGEN SATURATION: 97 % | RESPIRATION RATE: 20 BRPM | TEMPERATURE: 98.1 F | HEART RATE: 94 BPM

## 2020-12-28 LAB
BASOPHILS # BLD MANUAL: 0 THOUSAND/UL (ref 0–0.1)
BASOPHILS NFR MAR MANUAL: 0 % (ref 0–1)
EOSINOPHIL # BLD MANUAL: 0 THOUSAND/UL (ref 0–0.4)
EOSINOPHIL NFR BLD MANUAL: 0 % (ref 0–6)
ERYTHROCYTE [DISTWIDTH] IN BLOOD BY AUTOMATED COUNT: 13.5 % (ref 11.6–15.1)
HCT VFR BLD AUTO: 28.5 % (ref 36.5–49.3)
HGB BLD-MCNC: 9.6 G/DL (ref 12–17)
LYMPHOCYTES # BLD AUTO: 0.54 THOUSAND/UL (ref 0.6–4.47)
LYMPHOCYTES # BLD AUTO: 3 % (ref 14–44)
MCH RBC QN AUTO: 30.3 PG (ref 26.8–34.3)
MCHC RBC AUTO-ENTMCNC: 33.7 G/DL (ref 31.4–37.4)
MCV RBC AUTO: 90 FL (ref 82–98)
MONOCYTES # BLD AUTO: 0.72 THOUSAND/UL (ref 0–1.22)
MONOCYTES NFR BLD: 4 % (ref 4–12)
NEUTROPHILS # BLD MANUAL: 16.55 THOUSAND/UL (ref 1.85–7.62)
NEUTS SEG NFR BLD AUTO: 92 % (ref 43–75)
NRBC BLD AUTO-RTO: 0 /100 WBCS
PLATELET # BLD AUTO: 305 THOUSANDS/UL (ref 149–390)
PLATELET BLD QL SMEAR: ADEQUATE
PMV BLD AUTO: 10.6 FL (ref 8.9–12.7)
POIKILOCYTOSIS BLD QL SMEAR: PRESENT
POLYCHROMASIA BLD QL SMEAR: PRESENT
RBC # BLD AUTO: 3.17 MILLION/UL (ref 3.88–5.62)
RBC MORPH BLD: PRESENT
VARIANT LYMPHS # BLD AUTO: 1 %
WBC # BLD AUTO: 17.99 THOUSAND/UL (ref 4.31–10.16)

## 2020-12-28 PROCEDURE — 85027 COMPLETE CBC AUTOMATED: CPT | Performed by: PHYSICIAN ASSISTANT

## 2020-12-28 PROCEDURE — 97163 PT EVAL HIGH COMPLEX 45 MIN: CPT

## 2020-12-28 PROCEDURE — 99239 HOSP IP/OBS DSCHRG MGMT >30: CPT | Performed by: PHYSICIAN ASSISTANT

## 2020-12-28 PROCEDURE — 85007 BL SMEAR W/DIFF WBC COUNT: CPT | Performed by: PHYSICIAN ASSISTANT

## 2020-12-28 RX ORDER — PREDNISONE 20 MG/1
60 TABLET ORAL DAILY
Qty: 9 TABLET | Refills: 0
Start: 2020-12-29 | End: 2021-01-01

## 2020-12-28 RX ORDER — MULTIVITAMIN/IRON/FOLIC ACID 18MG-0.4MG
1 TABLET ORAL DAILY
Refills: 0
Start: 2020-12-29 | End: 2021-03-06 | Stop reason: ALTCHOICE

## 2020-12-28 RX ORDER — LISINOPRIL 5 MG/1
10 TABLET ORAL DAILY
Start: 2020-12-28 | End: 2021-03-06 | Stop reason: ALTCHOICE

## 2020-12-28 RX ADMIN — FLUTICASONE PROPIONATE 2 SPRAY: 50 SPRAY, METERED NASAL at 09:37

## 2020-12-28 RX ADMIN — DEXAMETHASONE SODIUM PHOSPHATE 6 MG: 4 INJECTION, SOLUTION INTRA-ARTICULAR; INTRALESIONAL; INTRAMUSCULAR; INTRAVENOUS; SOFT TISSUE at 03:45

## 2020-12-28 RX ADMIN — HEPARIN SODIUM 5000 UNITS: 5000 INJECTION INTRAVENOUS; SUBCUTANEOUS at 05:11

## 2020-12-28 RX ADMIN — Medication 2000 UNITS: at 09:20

## 2020-12-28 RX ADMIN — CEFTRIAXONE SODIUM 1000 MG: 10 INJECTION, POWDER, FOR SOLUTION INTRAVENOUS at 03:45

## 2020-12-28 RX ADMIN — AMLODIPINE BESYLATE 10 MG: 10 TABLET ORAL at 09:20

## 2020-12-28 RX ADMIN — FAMOTIDINE 20 MG: 20 TABLET ORAL at 14:02

## 2020-12-28 RX ADMIN — HEPARIN SODIUM 5000 UNITS: 5000 INJECTION INTRAVENOUS; SUBCUTANEOUS at 14:02

## 2020-12-28 RX ADMIN — ASPIRIN 81 MG: 81 TABLET ORAL at 09:20

## 2020-12-28 RX ADMIN — ZINC SULFATE 220 MG (50 MG) CAPSULE 220 MG: CAPSULE at 09:20

## 2020-12-28 RX ADMIN — OXYCODONE HYDROCHLORIDE AND ACETAMINOPHEN 1000 MG: 500 TABLET ORAL at 17:25

## 2020-12-28 RX ADMIN — OXYCODONE HYDROCHLORIDE AND ACETAMINOPHEN 1000 MG: 500 TABLET ORAL at 09:20

## 2020-12-29 ENCOUNTER — TRANSITIONAL CARE MANAGEMENT (OUTPATIENT)
Dept: FAMILY MEDICINE CLINIC | Facility: CLINIC | Age: 85
End: 2020-12-29

## 2020-12-30 ENCOUNTER — NURSING HOME VISIT (OUTPATIENT)
Dept: GERIATRICS | Facility: OTHER | Age: 85
End: 2020-12-30
Payer: COMMERCIAL

## 2020-12-30 DIAGNOSIS — E87.1 HYPONATREMIA: ICD-10-CM

## 2020-12-30 DIAGNOSIS — E43 SEVERE PROTEIN-CALORIE MALNUTRITION (HCC): ICD-10-CM

## 2020-12-30 DIAGNOSIS — I63.81 BASAL GANGLIA STROKE (HCC): ICD-10-CM

## 2020-12-30 DIAGNOSIS — U07.1 SEPSIS DUE TO COVID-19 (HCC): Primary | ICD-10-CM

## 2020-12-30 DIAGNOSIS — G93.40 ACUTE ENCEPHALOPATHY: ICD-10-CM

## 2020-12-30 DIAGNOSIS — E78.00 PURE HYPERCHOLESTEROLEMIA: Chronic | ICD-10-CM

## 2020-12-30 DIAGNOSIS — R26.2 AMBULATORY DYSFUNCTION: ICD-10-CM

## 2020-12-30 DIAGNOSIS — I10 ESSENTIAL HYPERTENSION: Chronic | ICD-10-CM

## 2020-12-30 DIAGNOSIS — N18.32 ACUTE RENAL FAILURE SUPERIMPOSED ON STAGE 3B CHRONIC KIDNEY DISEASE, UNSPECIFIED ACUTE RENAL FAILURE TYPE (HCC): ICD-10-CM

## 2020-12-30 DIAGNOSIS — A41.89 SEPSIS DUE TO COVID-19 (HCC): Primary | ICD-10-CM

## 2020-12-30 DIAGNOSIS — R55 SYNCOPE, UNSPECIFIED SYNCOPE TYPE: Chronic | ICD-10-CM

## 2020-12-30 DIAGNOSIS — K21.9 GASTROESOPHAGEAL REFLUX DISEASE WITHOUT ESOPHAGITIS: ICD-10-CM

## 2020-12-30 DIAGNOSIS — D72.829 LEUKOCYTOSIS, UNSPECIFIED TYPE: ICD-10-CM

## 2020-12-30 DIAGNOSIS — N17.9 ACUTE RENAL FAILURE SUPERIMPOSED ON STAGE 3B CHRONIC KIDNEY DISEASE, UNSPECIFIED ACUTE RENAL FAILURE TYPE (HCC): ICD-10-CM

## 2020-12-30 DIAGNOSIS — J96.01 ACUTE RESPIRATORY FAILURE WITH HYPOXIA (HCC): ICD-10-CM

## 2020-12-30 PROCEDURE — 99306 1ST NF CARE HIGH MDM 50: CPT | Performed by: FAMILY MEDICINE

## 2021-01-05 ENCOUNTER — NURSING HOME VISIT (OUTPATIENT)
Dept: GERIATRICS | Facility: OTHER | Age: 86
End: 2021-01-05
Payer: COMMERCIAL

## 2021-01-05 DIAGNOSIS — G93.40 ACUTE ENCEPHALOPATHY: ICD-10-CM

## 2021-01-05 DIAGNOSIS — U07.1 SEPSIS DUE TO COVID-19 (HCC): Primary | ICD-10-CM

## 2021-01-05 DIAGNOSIS — A41.89 SEPSIS DUE TO COVID-19 (HCC): Primary | ICD-10-CM

## 2021-01-05 DIAGNOSIS — R26.2 AMBULATORY DYSFUNCTION: ICD-10-CM

## 2021-01-05 DIAGNOSIS — I10 ESSENTIAL HYPERTENSION: Chronic | ICD-10-CM

## 2021-01-05 PROCEDURE — 99309 SBSQ NF CARE MODERATE MDM 30: CPT | Performed by: NURSE PRACTITIONER

## 2021-01-05 NOTE — PROGRESS NOTES
[address] [address] [phone] [facility]   Progress Note  POS 31      NAME: Rian Evans  AGE: 80 y o  SEX: male  :  1927  DATE OF ENCOUNTER: 2021    Chief Complaint   Patient seen and examined for follow up on chronic conditions  History of Present Illness     80 year old male patient  seen and examined today to follow-up on acute and chronic medical conditions  Patient is lying in bed, calm, cooperative and in no acute distress  Denies chest pain, sob, abdominal pain, nausea, vomiting, diarrhea, constipation, myalgia, arthralgia, fever, chills, headache, dizziness or visual disturbance  The following portions of the patient's history were reviewed and updated as appropriate: allergies, current medications, past family history, past medical history, past social history, past surgical history and problem list     Review of Systems     A review of systems was performed  All negative, except as per HPI  History     Past Medical History:   Diagnosis Date    HANNA (acute kidney injury) ([facility] [address] ) 2017    Arthritis     Cataracts, both eyes     HCAP (healthcare-associated pneumonia) 10/24/2019    Hyperlipidemia     Hypertension     Problems with hearing     Severe sepsis ([facility] [address] ) 2017    Stroke ([facility] [address] )     Syncope 10/23/2019     Past Surgical History:   Procedure Laterality Date    CAROTID ARTERY ANGIOPLASTY      CATARACT EXTRACTION       Family History   Problem Relation Age of Onset    Hypertension Mother     Hypertension Daughter      Social History     Socioeconomic History    Marital status:       Spouse name: Not on file    Number of children: Not on file    Years of education: Not on file    Highest education level: Not on file   Occupational History    Not on file   Social Needs    Financial resource strain: Not on file    Food insecurity     Worry: Not on file     Inability: Not on file   Mary Delacruz Transportation needs     Medical: Not on file     Non-medical: Not on file   Tobacco Use    Smoking status: Never Smoker    Smokeless tobacco: Never Used   Substance and Sexual Activity    Alcohol use: Never     Frequency: Never     Binge frequency: Never    Drug use: No    Sexual activity: Not Currently   Lifestyle    Physical activity     Days per week: Not on file     Minutes per session: Not on file    Stress: Not on file   Relationships    Social connections     Talks on phone: Not on file     Gets together: Not on file     Attends Sikh service: Not on file     Active member of club or organization: Not on file     Attends meetings of clubs or organizations: Not on file     Relationship status: Not on file    Intimate partner violence     Fear of current or ex partner: Not on file     Emotionally abused: Not on file     Physically abused: Not on file     Forced sexual activity: Not on file   Other Topics Concern    Not on file   Social History Narrative    [facility]     No Known Allergies    Objective     Vital Signs  BP: 151/76     HR:92 T: 98 1    RR: 22  O2Sat: 94% RA W 134 2 lbs  General: NAD, Well Nourished, Well Developed  Oral: Oropharynx Moist and Clear  Neck: Supple, +ROM  CV: S1, S2, normal rate, regular rhythm, no murmur appreciated  Pulmonary: Lung sounds clear to air, no wheezing, rhonchi, rales  Abdominal:BS + x4 in all quadrants, soft, no mass, no tenderness  Extremities: No edema, +ROM, +Strength  Skin: Warm, Dry, no lesions, no rash, no erythema present, no ecchymosis present  Neurological: CN 2-12 intact, PERRLA  Psych: Awake and alert, no mood, no affect    Pertinent Laboratory/Diagnostic Studies:  01/05/2021: Glucose 70, BUN 33, Creatinine 1 73, Sodium 134, Potassium 4 7, Chloride 101, CO2 24, Hemoglobin 9 3, Hematocrit 27 7, Platelet Count 224, WBC 12 9      Current Medications     Current Medications Reviewed and updated in Nursing Home EMR      Assessment and Plan Sepsis due to COVID-19 ([facility] [address] )  - Stable, patients inflammatory markers trending down  - Leukocytosis attributed to steroid therapy  - Continue vitamin supplementation  - Will monitor CBC and BMP  - Continue supportive care    Essential hypertension  - BP intermittently elevated  - Lisinopril discontinued in the hospital  - Continue Amlodipine 10 mg daily  - Will monitor blood pressure closely and consider restarting lisinopril if blood pressure consistently elevated      Acute encephalopathy  - Patient appears back to baseline  - Continue supportive care and delirium precautions    Ambulatory dysfunction  - Recurrent falls reported  - Continue PT/OT services  - Fall precautions advised      [address]  [facility]  01/05/2021

## 2021-01-07 NOTE — ASSESSMENT & PLAN NOTE
- BP intermittently elevated  - Lisinopril discontinued in the hospital  - Continue Amlodipine 10 mg daily  - Will monitor blood pressure closely and consider restarting lisinopril if blood pressure consistently elevated

## 2021-01-07 NOTE — ASSESSMENT & PLAN NOTE
- Stable, patients inflammatory markers trending down  - Leukocytosis attributed to steroid therapy  - Continue vitamin supplementation  - Will monitor CBC and BMP  - Continue supportive care

## 2021-01-08 ENCOUNTER — NURSING HOME VISIT (OUTPATIENT)
Dept: GERIATRICS | Facility: OTHER | Age: 86
End: 2021-01-08
Payer: COMMERCIAL

## 2021-01-08 DIAGNOSIS — I10 ESSENTIAL HYPERTENSION: Chronic | ICD-10-CM

## 2021-01-08 DIAGNOSIS — G89.29 CHRONIC BILATERAL LOW BACK PAIN WITHOUT SCIATICA: Primary | ICD-10-CM

## 2021-01-08 DIAGNOSIS — E87.1 HYPONATREMIA: ICD-10-CM

## 2021-01-08 DIAGNOSIS — R26.2 AMBULATORY DYSFUNCTION: ICD-10-CM

## 2021-01-08 DIAGNOSIS — M54.50 CHRONIC BILATERAL LOW BACK PAIN WITHOUT SCIATICA: Primary | ICD-10-CM

## 2021-01-08 PROCEDURE — 99309 SBSQ NF CARE MODERATE MDM 30: CPT | Performed by: NURSE PRACTITIONER

## 2021-01-08 NOTE — PROGRESS NOTES
[facility]  [address] [id number] [phone] [facility]   Progress Note  POS 31        NAME: Paul Sung  AGE: 80 y o  SEX: male  :  1927  DATE OF ENCOUNTER: 2021    Chief Complaint   Patient seen and examined for follow up on chronic conditions  History of Present Illness     80 year old male patient  seen and examined today to follow-up on acute and chronic medical conditions  Patient is lying in bed, calm, cooperative and in no acute distress  Denies chest pain, sob, abdominal pain, nausea, vomiting, diarrhea, constipation, fever, chills, headache, dizziness or visual disturbance  He is complaining of low back pain  This is a moderate pain that is worse with movement  He states that when he was taking icy hot, his pain went away  He is receiving comprehensive rehab services with an overall improvement in his functional status  He is scheduled to be discharged to the [facility] unit at [address] [place] [address] next week  The following portions of the patient's history were reviewed and updated as appropriate: allergies, current medications, past family history, past medical history, past social history, past surgical history and problem list     Review of Systems     A review of systems was performed  All negative, except as per HPI  History     Past Medical History:   Diagnosis Date    HANNA (acute kidney injury) ([facility] [address] ) 2017    Arthritis     Cataracts, both eyes     HCAP (healthcare-associated pneumonia) 10/24/2019    Hyperlipidemia     Hypertension     Problems with hearing     Severe sepsis ([address] ) 2017    Stroke ([facility] [address] )     Syncope 10/23/2019     Past Surgical History:   Procedure Laterality Date    CAROTID ARTERY ANGIOPLASTY      CATARACT EXTRACTION       Family History   Problem Relation Age of Onset    Hypertension Mother     Hypertension Daughter      Social History     Socioeconomic History    Marital status:       Spouse name: Not on file    Number of children: Not on file    Years of education: Not on file    Highest education level: Not on file   Occupational History    Not on file   Social Needs    Financial resource strain: Not on file    Food insecurity     Worry: Not on file     Inability: Not on file    Transportation needs     Medical: Not on file     Non-medical: Not on file   Tobacco Use    Smoking status: Never Smoker    Smokeless tobacco: Never Used   Substance and Sexual Activity    Alcohol use: Never     Frequency: Never     Binge frequency: Never    Drug use: No    Sexual activity: Not Currently   Lifestyle    Physical activity     Days per week: Not on file     Minutes per session: Not on file    Stress: Not on file   Relationships    Social connections     Talks on phone: Not on file     Gets together: Not on file     Attends Sabianism service: Not on file     Active member of club or organization: Not on file     Attends meetings of clubs or organizations: Not on file     Relationship status: Not on file    Intimate partner violence     Fear of current or ex partner: Not on file     Emotionally abused: Not on file     Physically abused: Not on file     Forced sexual activity: Not on file   Other Topics Concern    Not on file   Social History Narrative    From [facility]     No Known Allergies    Objective     Vital Signs  BP: 139/67    HR: 100  T: 98 0    RR: 24  O2Sat: 93% RA W: 132 5 lbs  General: NAD, Well Nourished, Well Developed  Oral: Oropharynx Moist and Clear  Neck: Supple, +ROM  CV: S1, S2, normal rate, regular rhythm, no murmur appreciated  Pulmonary: Lung sounds clear to air, no wheezing, rhonchi, rales  Abdominal:BS + x4 in all quadrants, soft, no mass, no tenderness  Extremities: No edema, +ROM, +Strength  Skin: Warm, Dry, no lesions, no rash, no erythema present, no ecchymosis present  Neurological: CN 2-12 intact, PERRLA  Psych: Alert and oriented times 3, no mood, no affect    Pertinent Laboratory/Diagnostic Studies:  01/05/2021: Glucose 70, BUN 33, Creatinine 1 73, Sodium 134, Potassium 4 7, Chloride 101, CO2 24, Hemoglobin 9 3, Hematocrit 27 7, Platelet Count 503, WBC 12 9    Current Medications     Current Medications Reviewed and updated in Nursing Home EMR      Assessment and Plan     Back pain  - Chronic back pain reported  - Will order icy hot topical gel to be applied to his back two times daily and two times daily prn  - Continue Tylenol and PT/OT services    Hyponatremia  - Mild related to poor oral intake  - Will repeat BMP on Monday 01/11/2021 to monitor    Ambulatory dysfunction  - Patient progressing with PT/OT services, he is walking 100 feet with RW CG  - Continue PT/OT services  - Fall precautions advised    Essential hypertension  - BP currently controlled  - Lisinopril discontinued in the hospital  - Continue Amlodipine 10 mg daily  - Will monitor vital signs, electrolytes and renal function      [address] [employer]  01/08/2021

## 2021-01-10 ENCOUNTER — HOSPITAL ENCOUNTER (INPATIENT)
Facility: HOSPITAL | Age: 86
LOS: 10 days | Discharge: NURSING, REHAB OR LONG-TERM CARE | DRG: 871 | End: 2021-01-20
Attending: EMERGENCY MEDICINE | Admitting: INTERNAL MEDICINE
Payer: COMMERCIAL

## 2021-01-10 ENCOUNTER — APPOINTMENT (EMERGENCY)
Dept: RADIOLOGY | Facility: HOSPITAL | Age: 86
DRG: 871 | End: 2021-01-10
Payer: COMMERCIAL

## 2021-01-10 ENCOUNTER — TELEPHONE (OUTPATIENT)
Dept: OTHER | Facility: OTHER | Age: 86
End: 2021-01-10

## 2021-01-10 DIAGNOSIS — K59.00 CONSTIPATION: ICD-10-CM

## 2021-01-10 DIAGNOSIS — R55 SYNCOPE: Primary | ICD-10-CM

## 2021-01-10 DIAGNOSIS — N17.9 AKI (ACUTE KIDNEY INJURY) (HCC): ICD-10-CM

## 2021-01-10 DIAGNOSIS — G47.00 INSOMNIA: ICD-10-CM

## 2021-01-10 DIAGNOSIS — J18.9 RIGHT MIDDLE LOBE PNEUMONIA: ICD-10-CM

## 2021-01-10 PROBLEM — R50.9 FEVER IN ADULT: Status: ACTIVE | Noted: 2021-01-10

## 2021-01-10 LAB
ALBUMIN SERPL BCP-MCNC: 1.9 G/DL (ref 3.5–5)
ALP SERPL-CCNC: 169 U/L (ref 46–116)
ALT SERPL W P-5'-P-CCNC: 28 U/L (ref 12–78)
AMORPH URATE CRY URNS QL MICRO: ABNORMAL /HPF
ANION GAP SERPL CALCULATED.3IONS-SCNC: 8 MMOL/L (ref 4–13)
ANISOCYTOSIS BLD QL SMEAR: PRESENT
APTT PPP: 44 SECONDS (ref 23–37)
AST SERPL W P-5'-P-CCNC: 42 U/L (ref 5–45)
BACTERIA UR QL AUTO: ABNORMAL /HPF
BASOPHILS # BLD MANUAL: 0.2 THOUSAND/UL (ref 0–0.1)
BASOPHILS NFR MAR MANUAL: 1 % (ref 0–1)
BILIRUB SERPL-MCNC: 0.57 MG/DL (ref 0.2–1)
BILIRUB UR QL STRIP: NEGATIVE
BUN SERPL-MCNC: 30 MG/DL (ref 5–25)
CALCIUM ALBUM COR SERPL-MCNC: 10.4 MG/DL (ref 8.3–10.1)
CALCIUM SERPL-MCNC: 8.7 MG/DL (ref 8.3–10.1)
CHLORIDE SERPL-SCNC: 98 MMOL/L (ref 100–108)
CLARITY UR: ABNORMAL
CO2 SERPL-SCNC: 23 MMOL/L (ref 21–32)
COLOR UR: YELLOW
CREAT SERPL-MCNC: 2.13 MG/DL (ref 0.6–1.3)
EOSINOPHIL # BLD MANUAL: 0 THOUSAND/UL (ref 0–0.4)
EOSINOPHIL NFR BLD MANUAL: 0 % (ref 0–6)
ERYTHROCYTE [DISTWIDTH] IN BLOOD BY AUTOMATED COUNT: 14.1 % (ref 11.6–15.1)
FINE GRAN CASTS URNS QL MICRO: ABNORMAL /LPF
GFR SERPL CREATININE-BSD FRML MDRD: 26 ML/MIN/1.73SQ M
GLUCOSE SERPL-MCNC: 119 MG/DL (ref 65–140)
GLUCOSE UR STRIP-MCNC: NEGATIVE MG/DL
HCT VFR BLD AUTO: 27.8 % (ref 36.5–49.3)
HGB BLD-MCNC: 9.1 G/DL (ref 12–17)
HGB UR QL STRIP.AUTO: NEGATIVE
INR PPP: 1.11 (ref 0.84–1.19)
KETONES UR STRIP-MCNC: NEGATIVE MG/DL
LACTATE SERPL-SCNC: 1.8 MMOL/L (ref 0.5–2)
LEUKOCYTE ESTERASE UR QL STRIP: NEGATIVE
LYMPHOCYTES # BLD AUTO: 0.8 THOUSAND/UL (ref 0.6–4.47)
LYMPHOCYTES # BLD AUTO: 4 % (ref 14–44)
MCH RBC QN AUTO: 30.2 PG (ref 26.8–34.3)
MCHC RBC AUTO-ENTMCNC: 32.7 G/DL (ref 31.4–37.4)
MCV RBC AUTO: 92 FL (ref 82–98)
MONOCYTES # BLD AUTO: 0.8 THOUSAND/UL (ref 0–1.22)
MONOCYTES NFR BLD: 4 % (ref 4–12)
NEUTROPHILS # BLD MANUAL: 18.04 THOUSAND/UL (ref 1.85–7.62)
NEUTS SEG NFR BLD AUTO: 90 % (ref 43–75)
NITRITE UR QL STRIP: NEGATIVE
NON-SQ EPI CELLS URNS QL MICRO: ABNORMAL /HPF
NRBC BLD AUTO-RTO: 0 /100 WBCS
PH UR STRIP.AUTO: 5.5 [PH]
PLATELET # BLD AUTO: 284 THOUSANDS/UL (ref 149–390)
PLATELET BLD QL SMEAR: ADEQUATE
PMV BLD AUTO: 10 FL (ref 8.9–12.7)
POTASSIUM SERPL-SCNC: 4.8 MMOL/L (ref 3.5–5.3)
PROCALCITONIN SERPL-MCNC: 0.28 NG/ML
PROT SERPL-MCNC: 7.3 G/DL (ref 6.4–8.2)
PROT UR STRIP-MCNC: ABNORMAL MG/DL
PROTHROMBIN TIME: 14.3 SECONDS (ref 11.6–14.5)
RBC # BLD AUTO: 3.01 MILLION/UL (ref 3.88–5.62)
RBC #/AREA URNS AUTO: ABNORMAL /HPF
RBC MORPH BLD: PRESENT
SODIUM SERPL-SCNC: 129 MMOL/L (ref 136–145)
SP GR UR STRIP.AUTO: 1.02 (ref 1–1.03)
TROPONIN I SERPL-MCNC: <0.02 NG/ML
UROBILINOGEN UR QL STRIP.AUTO: 2 E.U./DL
VARIANT LYMPHS # BLD AUTO: 1 %
WBC # BLD AUTO: 20.04 THOUSAND/UL (ref 4.31–10.16)
WBC #/AREA URNS AUTO: ABNORMAL /HPF

## 2021-01-10 PROCEDURE — 84145 PROCALCITONIN (PCT): CPT | Performed by: EMERGENCY MEDICINE

## 2021-01-10 PROCEDURE — 99285 EMERGENCY DEPT VISIT HI MDM: CPT

## 2021-01-10 PROCEDURE — 93005 ELECTROCARDIOGRAM TRACING: CPT

## 2021-01-10 PROCEDURE — 71045 X-RAY EXAM CHEST 1 VIEW: CPT

## 2021-01-10 PROCEDURE — 85610 PROTHROMBIN TIME: CPT | Performed by: EMERGENCY MEDICINE

## 2021-01-10 PROCEDURE — 85730 THROMBOPLASTIN TIME PARTIAL: CPT | Performed by: EMERGENCY MEDICINE

## 2021-01-10 PROCEDURE — 96365 THER/PROPH/DIAG IV INF INIT: CPT

## 2021-01-10 PROCEDURE — 80053 COMPREHEN METABOLIC PANEL: CPT | Performed by: EMERGENCY MEDICINE

## 2021-01-10 PROCEDURE — 85027 COMPLETE CBC AUTOMATED: CPT | Performed by: EMERGENCY MEDICINE

## 2021-01-10 PROCEDURE — 83605 ASSAY OF LACTIC ACID: CPT | Performed by: EMERGENCY MEDICINE

## 2021-01-10 PROCEDURE — 36415 COLL VENOUS BLD VENIPUNCTURE: CPT | Performed by: EMERGENCY MEDICINE

## 2021-01-10 PROCEDURE — 81001 URINALYSIS AUTO W/SCOPE: CPT | Performed by: EMERGENCY MEDICINE

## 2021-01-10 PROCEDURE — 87040 BLOOD CULTURE FOR BACTERIA: CPT | Performed by: EMERGENCY MEDICINE

## 2021-01-10 PROCEDURE — 99285 EMERGENCY DEPT VISIT HI MDM: CPT | Performed by: EMERGENCY MEDICINE

## 2021-01-10 PROCEDURE — 84484 ASSAY OF TROPONIN QUANT: CPT | Performed by: EMERGENCY MEDICINE

## 2021-01-10 PROCEDURE — 99223 1ST HOSP IP/OBS HIGH 75: CPT | Performed by: INTERNAL MEDICINE

## 2021-01-10 PROCEDURE — 85007 BL SMEAR W/DIFF WBC COUNT: CPT | Performed by: EMERGENCY MEDICINE

## 2021-01-10 RX ORDER — SODIUM CHLORIDE 9 MG/ML
3 INJECTION INTRAVENOUS
Status: DISCONTINUED | OUTPATIENT
Start: 2021-01-10 | End: 2021-01-20 | Stop reason: HOSPADM

## 2021-01-10 RX ADMIN — CEFTRIAXONE 1000 MG: 1 INJECTION, POWDER, FOR SOLUTION INTRAMUSCULAR; INTRAVENOUS at 21:26

## 2021-01-10 RX ADMIN — SODIUM CHLORIDE 1000 ML: 0.9 INJECTION, SOLUTION INTRAVENOUS at 21:21

## 2021-01-11 PROBLEM — I95.1 ORTHOSTASIS: Status: RESOLVED | Noted: 2020-11-08 | Resolved: 2021-01-11

## 2021-01-11 PROBLEM — Z86.73 HISTORY OF STROKE: Status: ACTIVE | Noted: 2021-01-11

## 2021-01-11 LAB
ANION GAP SERPL CALCULATED.3IONS-SCNC: 7 MMOL/L (ref 4–13)
ATRIAL RATE: 109 BPM
ATRIAL RATE: 96 BPM
BASOPHILS # BLD AUTO: 0.04 THOUSANDS/ΜL (ref 0–0.1)
BASOPHILS NFR BLD AUTO: 0 % (ref 0–1)
BUN SERPL-MCNC: 25 MG/DL (ref 5–25)
CALCIUM SERPL-MCNC: 8.7 MG/DL (ref 8.3–10.1)
CHLORIDE SERPL-SCNC: 101 MMOL/L (ref 100–108)
CO2 SERPL-SCNC: 22 MMOL/L (ref 21–32)
CREAT SERPL-MCNC: 1.71 MG/DL (ref 0.6–1.3)
EOSINOPHIL # BLD AUTO: 0.16 THOUSAND/ΜL (ref 0–0.61)
EOSINOPHIL NFR BLD AUTO: 1 % (ref 0–6)
ERYTHROCYTE [DISTWIDTH] IN BLOOD BY AUTOMATED COUNT: 14.2 % (ref 11.6–15.1)
FLUAV RNA RESP QL NAA+PROBE: NEGATIVE
FLUBV RNA RESP QL NAA+PROBE: NEGATIVE
GFR SERPL CREATININE-BSD FRML MDRD: 34 ML/MIN/1.73SQ M
GLUCOSE SERPL-MCNC: 113 MG/DL (ref 65–140)
HCT VFR BLD AUTO: 25.7 % (ref 36.5–49.3)
HGB BLD-MCNC: 8.3 G/DL (ref 12–17)
IMM GRANULOCYTES # BLD AUTO: 0.06 THOUSAND/UL (ref 0–0.2)
IMM GRANULOCYTES NFR BLD AUTO: 1 % (ref 0–2)
LYMPHOCYTES # BLD AUTO: 0.75 THOUSANDS/ΜL (ref 0.6–4.47)
LYMPHOCYTES NFR BLD AUTO: 6 % (ref 14–44)
MAGNESIUM SERPL-MCNC: 1.6 MG/DL (ref 1.6–2.6)
MCH RBC QN AUTO: 30.3 PG (ref 26.8–34.3)
MCHC RBC AUTO-ENTMCNC: 32.3 G/DL (ref 31.4–37.4)
MCV RBC AUTO: 94 FL (ref 82–98)
MONOCYTES # BLD AUTO: 0.73 THOUSAND/ΜL (ref 0.17–1.22)
MONOCYTES NFR BLD AUTO: 6 % (ref 4–12)
NEUTROPHILS # BLD AUTO: 10.67 THOUSANDS/ΜL (ref 1.85–7.62)
NEUTS SEG NFR BLD AUTO: 86 % (ref 43–75)
NRBC BLD AUTO-RTO: 0 /100 WBCS
P AXIS: 52 DEGREES
P AXIS: 64 DEGREES
PHOSPHATE SERPL-MCNC: 3.7 MG/DL (ref 2.3–4.1)
PLATELET # BLD AUTO: 248 THOUSANDS/UL (ref 149–390)
PLATELET # BLD AUTO: 254 THOUSANDS/UL (ref 149–390)
PMV BLD AUTO: 9.3 FL (ref 8.9–12.7)
PMV BLD AUTO: 9.3 FL (ref 8.9–12.7)
POTASSIUM SERPL-SCNC: 3.9 MMOL/L (ref 3.5–5.3)
PR INTERVAL: 118 MS
PR INTERVAL: 120 MS
PROCALCITONIN SERPL-MCNC: 0.76 NG/ML
QRS AXIS: 47 DEGREES
QRS AXIS: 63 DEGREES
QRSD INTERVAL: 106 MS
QRSD INTERVAL: 108 MS
QT INTERVAL: 316 MS
QT INTERVAL: 344 MS
QTC INTERVAL: 425 MS
QTC INTERVAL: 434 MS
RBC # BLD AUTO: 2.74 MILLION/UL (ref 3.88–5.62)
RSV RNA RESP QL NAA+PROBE: NEGATIVE
SARS-COV-2 RNA RESP QL NAA+PROBE: NEGATIVE
SODIUM SERPL-SCNC: 130 MMOL/L (ref 136–145)
T WAVE AXIS: 23 DEGREES
T WAVE AXIS: 53 DEGREES
TROPONIN I SERPL-MCNC: <0.02 NG/ML
TSH SERPL DL<=0.05 MIU/L-ACNC: 1.17 UIU/ML (ref 0.36–3.74)
VENTRICULAR RATE: 109 BPM
VENTRICULAR RATE: 96 BPM
WBC # BLD AUTO: 12.41 THOUSAND/UL (ref 4.31–10.16)

## 2021-01-11 PROCEDURE — 84145 PROCALCITONIN (PCT): CPT | Performed by: EMERGENCY MEDICINE

## 2021-01-11 PROCEDURE — 36415 COLL VENOUS BLD VENIPUNCTURE: CPT | Performed by: EMERGENCY MEDICINE

## 2021-01-11 PROCEDURE — 93010 ELECTROCARDIOGRAM REPORT: CPT | Performed by: INTERNAL MEDICINE

## 2021-01-11 PROCEDURE — 80048 BASIC METABOLIC PNL TOTAL CA: CPT | Performed by: INTERNAL MEDICINE

## 2021-01-11 PROCEDURE — 0241U HB NFCT DS VIR RESP RNA 4 TRGT: CPT | Performed by: INTERNAL MEDICINE

## 2021-01-11 PROCEDURE — 85049 AUTOMATED PLATELET COUNT: CPT | Performed by: INTERNAL MEDICINE

## 2021-01-11 PROCEDURE — 84443 ASSAY THYROID STIM HORMONE: CPT | Performed by: INTERNAL MEDICINE

## 2021-01-11 PROCEDURE — 85025 COMPLETE CBC W/AUTO DIFF WBC: CPT | Performed by: INTERNAL MEDICINE

## 2021-01-11 PROCEDURE — 87086 URINE CULTURE/COLONY COUNT: CPT | Performed by: INTERNAL MEDICINE

## 2021-01-11 PROCEDURE — 87081 CULTURE SCREEN ONLY: CPT | Performed by: INTERNAL MEDICINE

## 2021-01-11 PROCEDURE — 99233 SBSQ HOSP IP/OBS HIGH 50: CPT | Performed by: INTERNAL MEDICINE

## 2021-01-11 PROCEDURE — 83735 ASSAY OF MAGNESIUM: CPT | Performed by: INTERNAL MEDICINE

## 2021-01-11 PROCEDURE — 84484 ASSAY OF TROPONIN QUANT: CPT | Performed by: INTERNAL MEDICINE

## 2021-01-11 PROCEDURE — 93005 ELECTROCARDIOGRAM TRACING: CPT

## 2021-01-11 PROCEDURE — 84100 ASSAY OF PHOSPHORUS: CPT | Performed by: INTERNAL MEDICINE

## 2021-01-11 RX ORDER — ASPIRIN 81 MG/1
81 TABLET ORAL DAILY
Status: DISCONTINUED | OUTPATIENT
Start: 2021-01-11 | End: 2021-01-20 | Stop reason: HOSPADM

## 2021-01-11 RX ORDER — AMLODIPINE BESYLATE 10 MG/1
10 TABLET ORAL DAILY
Status: DISCONTINUED | OUTPATIENT
Start: 2021-01-11 | End: 2021-01-20 | Stop reason: HOSPADM

## 2021-01-11 RX ORDER — DOCUSATE SODIUM 100 MG/1
100 CAPSULE, LIQUID FILLED ORAL 2 TIMES DAILY
Status: DISCONTINUED | OUTPATIENT
Start: 2021-01-11 | End: 2021-01-20 | Stop reason: HOSPADM

## 2021-01-11 RX ORDER — SODIUM CHLORIDE 9 MG/ML
50 INJECTION, SOLUTION INTRAVENOUS CONTINUOUS
Status: DISCONTINUED | OUTPATIENT
Start: 2021-01-11 | End: 2021-01-14

## 2021-01-11 RX ORDER — CHOLECALCIFEROL (VITAMIN D3) 10 MCG
1 TABLET ORAL
Status: DISCONTINUED | OUTPATIENT
Start: 2021-01-11 | End: 2021-01-20 | Stop reason: HOSPADM

## 2021-01-11 RX ORDER — CHLORAL HYDRATE 500 MG
1200 CAPSULE ORAL DAILY
Status: DISCONTINUED | OUTPATIENT
Start: 2021-01-11 | End: 2021-01-20 | Stop reason: HOSPADM

## 2021-01-11 RX ORDER — LISINOPRIL 10 MG/1
10 TABLET ORAL DAILY
Status: DISCONTINUED | OUTPATIENT
Start: 2021-01-11 | End: 2021-01-20 | Stop reason: HOSPADM

## 2021-01-11 RX ORDER — PANTOPRAZOLE SODIUM 20 MG/1
20 TABLET, DELAYED RELEASE ORAL
Status: DISCONTINUED | OUTPATIENT
Start: 2021-01-11 | End: 2021-01-20 | Stop reason: HOSPADM

## 2021-01-11 RX ORDER — HYDRALAZINE HYDROCHLORIDE 10 MG/1
10 TABLET, FILM COATED ORAL DAILY
Status: DISCONTINUED | OUTPATIENT
Start: 2021-01-11 | End: 2021-01-20 | Stop reason: HOSPADM

## 2021-01-11 RX ORDER — MELATONIN
1000 DAILY
Status: DISCONTINUED | OUTPATIENT
Start: 2021-01-11 | End: 2021-01-20 | Stop reason: HOSPADM

## 2021-01-11 RX ORDER — FLUTICASONE PROPIONATE 50 MCG
1 SPRAY, SUSPENSION (ML) NASAL DAILY
Status: DISCONTINUED | OUTPATIENT
Start: 2021-01-11 | End: 2021-01-20 | Stop reason: HOSPADM

## 2021-01-11 RX ORDER — ONDANSETRON 2 MG/ML
4 INJECTION INTRAMUSCULAR; INTRAVENOUS EVERY 6 HOURS PRN
Status: DISCONTINUED | OUTPATIENT
Start: 2021-01-11 | End: 2021-01-20 | Stop reason: HOSPADM

## 2021-01-11 RX ORDER — MAGNESIUM HYDROXIDE/ALUMINUM HYDROXICE/SIMETHICONE 120; 1200; 1200 MG/30ML; MG/30ML; MG/30ML
30 SUSPENSION ORAL EVERY 6 HOURS PRN
Status: DISCONTINUED | OUTPATIENT
Start: 2021-01-11 | End: 2021-01-20 | Stop reason: HOSPADM

## 2021-01-11 RX ORDER — HEPARIN SODIUM 5000 [USP'U]/ML
5000 INJECTION, SOLUTION INTRAVENOUS; SUBCUTANEOUS EVERY 8 HOURS SCHEDULED
Status: DISCONTINUED | OUTPATIENT
Start: 2021-01-11 | End: 2021-01-20 | Stop reason: HOSPADM

## 2021-01-11 RX ORDER — PRAVASTATIN SODIUM 40 MG
40 TABLET ORAL
Status: DISCONTINUED | OUTPATIENT
Start: 2021-01-11 | End: 2021-01-20 | Stop reason: HOSPADM

## 2021-01-11 RX ADMIN — DOCUSATE SODIUM 100 MG: 100 CAPSULE, LIQUID FILLED ORAL at 18:22

## 2021-01-11 RX ADMIN — CEFTRIAXONE 1000 MG: 2 INJECTION, POWDER, FOR SOLUTION INTRAMUSCULAR; INTRAVENOUS at 21:34

## 2021-01-11 RX ADMIN — DOCUSATE SODIUM 100 MG: 100 CAPSULE, LIQUID FILLED ORAL at 08:18

## 2021-01-11 RX ADMIN — ASPIRIN 81 MG: 81 TABLET, COATED ORAL at 08:18

## 2021-01-11 RX ADMIN — AMLODIPINE BESYLATE 10 MG: 10 TABLET ORAL at 08:18

## 2021-01-11 RX ADMIN — SODIUM CHLORIDE 100 ML/HR: 0.9 INJECTION, SOLUTION INTRAVENOUS at 04:57

## 2021-01-11 RX ADMIN — HEPARIN SODIUM 5000 UNITS: 5000 INJECTION INTRAVENOUS; SUBCUTANEOUS at 06:18

## 2021-01-11 RX ADMIN — LISINOPRIL 10 MG: 10 TABLET ORAL at 08:18

## 2021-01-11 RX ADMIN — Medication 1 CAPSULE: at 18:22

## 2021-01-11 RX ADMIN — HYDRALAZINE HYDROCHLORIDE 10 MG: 10 TABLET, FILM COATED ORAL at 08:18

## 2021-01-11 RX ADMIN — HEPARIN SODIUM 5000 UNITS: 5000 INJECTION INTRAVENOUS; SUBCUTANEOUS at 21:34

## 2021-01-11 RX ADMIN — HEPARIN SODIUM 5000 UNITS: 5000 INJECTION INTRAVENOUS; SUBCUTANEOUS at 18:23

## 2021-01-11 RX ADMIN — PANTOPRAZOLE SODIUM 20 MG: 20 TABLET, DELAYED RELEASE ORAL at 06:18

## 2021-01-11 RX ADMIN — OMEGA-3 FATTY ACIDS CAP 1000 MG 1000 MG: 1000 CAP at 08:18

## 2021-01-11 RX ADMIN — PRAVASTATIN SODIUM 40 MG: 40 TABLET ORAL at 18:23

## 2021-01-11 RX ADMIN — SODIUM CHLORIDE 100 ML/HR: 0.9 INJECTION, SOLUTION INTRAVENOUS at 18:24

## 2021-01-11 RX ADMIN — Medication 1 TABLET: at 08:18

## 2021-01-11 RX ADMIN — Medication 1000 UNITS: at 08:18

## 2021-01-11 NOTE — ED PROVIDER NOTES
History  Chief Complaint   Patient presents with    Syncope     pt presents afer a syncopal episode while going to bathroom with nursing aides at assisted living facility  once EMS arrived pt was alert adn responsive  pt has no complaints at this time     HPI   80year-old gentleman, resident of [facility], presents to the emergency department for syncopal episode  Patient has some underlying dementia  A nurse aide was helping the patient ambulate to the bathroom when he went limp in their hands and appeared to briefly lose consciousness  Patient was lowered to the ground, did not fall or strike his head  Patient regained consciousness and was his normal self afterward  Patient does not recall the episode  He says he currently feels well  He was noted to be tachycardic prior to transfer from nursing home to ED  No report of fever at nursing home but patient is borderline febrile here  Patient denies feeling short of breath or coughing  He has no head pain, neck pain, back pain, abdominal pain, nausea, vomiting, urinary symptoms, or diarrhea  The patient tested positive for COVID-19 in December 2020  Prior to Admission Medications   Prescriptions Last Dose Informant Patient Reported? Taking?    B COMPLEX-C-FOLIC ACID PO   Yes Yes   Sig: Take 1 tablet by mouth daily   Cholecalciferol (VITAMIN D3) 1000 units CAPS  Outside Facility (Specify) Yes Yes   Sig: Take 5,000 Units by mouth   Omega-3 Fatty Acids (FISH OIL) 1200 MG CAPS  Outside Facility (Specify) Yes Yes   Sig: Take 1 capsule by mouth daily   amLODIPine (NORVASC) 10 mg tablet  Outside Facility (Specify) No Yes   Sig: Take 1 tablet (10 mg total) by mouth daily   aspirin (ECOTRIN LOW STRENGTH) 81 mg EC tablet  Outside Facility (Specify) Yes Yes   Sig: Take 81 mg by mouth daily   fluticasone (FLONASE) 50 mcg/act nasal spray  Outside Facility (Specify) Yes Yes   hydrALAZINE (APRESOLINE) 10 mg tablet  Outside Facility (Specify) Yes Yes lisinopril (ZESTRIL) 5 mg tablet   No Yes   Sig: Take 2 tablets (10 mg total) by mouth daily   multivitamin-minerals (CENTRUM ADULTS) tablet   No Yes   Sig: Take 1 tablet by mouth daily   omeprazole (PriLOSEC) 20 mg delayed release capsule  Outside Facility (Specify) No Yes   Sig: Take 1 capsule (20 mg total) by mouth daily   pravastatin (PRAVACHOL) 40 mg tablet  Outside Facility (Specify) Yes Yes   Sig: Take 40 mg by mouth daily ALTERNATES 20MG AND 40MG EVERY OTHER DAY      Facility-Administered Medications: None       Past Medical History:   Diagnosis Date    HANNA (acute kidney injury) ([place] [address] ) 6/20/2017    Arthritis     Cataracts, both eyes     HCAP (healthcare-associated pneumonia) 10/24/2019    Hyperlipidemia     Hypertension     Problems with hearing     Severe sepsis ([place] [address] ) 6/20/2017    Stroke ([place] [address] )     Syncope 10/23/2019       Past Surgical History:   Procedure Laterality Date    CAROTID ARTERY ANGIOPLASTY      CATARACT EXTRACTION         Family History   Problem Relation Age of Onset    Hypertension Mother     Hypertension Daughter      I have reviewed and agree with the history as documented  E-Cigarette/Vaping    E-Cigarette Use Never User      E-Cigarette/Vaping Substances     Social History     Tobacco Use    Smoking status: Never Smoker    Smokeless tobacco: Never Used   Substance Use Topics    Alcohol use: Never     Frequency: Never     Binge frequency: Never    Drug use: No        Review of Systems   Constitutional: Negative for chills and fever  HENT: Negative for congestion and rhinorrhea  Respiratory: Negative for cough and shortness of breath  Cardiovascular: Negative for chest pain  Gastrointestinal: Negative for abdominal pain, nausea and vomiting  Genitourinary: Negative for dysuria and flank pain  Musculoskeletal: Negative for arthralgias, myalgias and neck pain  Skin: Negative for rash and wound  Neurological: Positive for syncope  Negative for headaches  All other systems reviewed and are negative  Physical Exam  ED Triage Vitals   Temperature Pulse Respirations Blood Pressure SpO2   01/10/21 2014 01/10/21 1956 01/10/21 1956 01/10/21 1956 01/10/21 1956   100 1 °F (37 8 °C) (!) 116 18 143/68 95 %      Temp Source Heart Rate Source Patient Position - Orthostatic VS BP Location FiO2 (%)   01/10/21 2014 01/10/21 1956 01/10/21 1956 01/10/21 1956 --   Rectal Monitor Lying Right arm       Pain Score       01/11/21 0300       No Pain             Orthostatic Vital Signs  Vitals:    01/11/21 1400 01/11/21 1644 01/11/21 1908 01/11/21 2223   BP: 163/81 142/70 152/90 128/67   Pulse: 100 99 (!) 109 104   Patient Position - Orthostatic VS:           Physical Exam  Vitals signs and nursing note reviewed  Constitutional:       General: He is not in acute distress  Appearance: He is well-developed  He is not diaphoretic  HENT:      Head: Normocephalic and atraumatic  Eyes:      General: No scleral icterus  Conjunctiva/sclera: Conjunctivae normal       Pupils: Pupils are equal, round, and reactive to light  Neck:      Musculoskeletal: Normal range of motion and neck supple  Cardiovascular:      Rate and Rhythm: Regular rhythm  Tachycardia present  Heart sounds: No murmur  No friction rub  No gallop  Pulmonary:      Breath sounds: Normal breath sounds  No wheezing or rales  Abdominal:      General: There is no distension  Palpations: Abdomen is soft  Tenderness: There is no abdominal tenderness  There is no guarding or rebound  Musculoskeletal: Normal range of motion  General: No tenderness  Skin:     General: Skin is warm and dry  Coloration: Skin is not pale  Findings: No erythema  Neurological:      Mental Status: He is alert and oriented to person, place, and time  Cranial Nerves: No cranial nerve deficit  Sensory: No sensory deficit  Motor: No abnormal muscle tone        Comments: Mild dementia but oriented     Psychiatric:         Behavior: Behavior normal          ED Medications  Medications   sodium chloride (PF) 0 9 % injection 3 mL (has no administration in time range)   amLODIPine (NORVASC) tablet 10 mg (10 mg Oral Given 1/11/21 0818)   aspirin (ECOTRIN LOW STRENGTH) EC tablet 81 mg (81 mg Oral Given 1/11/21 0818)   b complex-vitamin C-folic acid (NEPHROCAPS) capsule 1 capsule (1 capsule Oral Given 1/11/21 1822)   fish oil capsule 1,000 mg (1,000 mg Oral Given 1/11/21 0818)   fluticasone (FLONASE) 50 mcg/act nasal spray 1 spray (1 spray Each Nare Not Given 1/11/21 0818)   hydrALAZINE (APRESOLINE) tablet 10 mg (10 mg Oral Given 1/11/21 0818)   lisinopril (ZESTRIL) tablet 10 mg (10 mg Oral Given 1/11/21 0818)   multivitamin-minerals (CENTRUM) tablet 1 tablet (1 tablet Oral Given 1/11/21 0818)   pantoprazole (PROTONIX) EC tablet 20 mg (20 mg Oral Given 1/11/21 0618)   pravastatin (PRAVACHOL) tablet 40 mg (40 mg Oral Given 1/11/21 1823)   cholecalciferol (VITAMIN D3) tablet 1,000 Units (1,000 Units Oral Given 1/11/21 0818)   docusate sodium (COLACE) capsule 100 mg (100 mg Oral Given 1/11/21 1822)   ondansetron (ZOFRAN) injection 4 mg (has no administration in time range)   aluminum-magnesium hydroxide-simethicone (MYLANTA) oral suspension 30 mL (has no administration in time range)   heparin (porcine) subcutaneous injection 5,000 Units (5,000 Units Subcutaneous Given 1/11/21 2134)   ceftriaxone (ROCEPHIN) 1 g/50 mL in dextrose IVPB (0 mg Intravenous Stopped 1/11/21 2204)   sodium chloride 0 9 % infusion (100 mL/hr Intravenous Restarted 1/11/21 2204)   sodium chloride 0 9 % bolus 1,000 mL (0 mL Intravenous Stopped 1/10/21 2219)   ceftriaxone (ROCEPHIN) 1 g/50 mL in dextrose IVPB (0 mg Intravenous Stopped 1/10/21 2219)       Diagnostic Studies  Results Reviewed     Procedure Component Value Units Date/Time    COVID19, Influenza A/B, RSV PCR, [facility] [[id number]]  (Normal) Collected: 01/11/21 1225    Lab Status: Final result Specimen: Nares from Nasopharyngeal Swab Updated: 01/11/21 1331     SARS-CoV-2 Negative     INFLUENZA A PCR Negative     INFLUENZA B PCR Negative     RSV PCR Negative    Narrative: This test has been authorized by FDA under an EUA (Emergency Use Assay) for use by authorized laboratories  Clinical caution and judgement should be used with the interpretation of these results with consideration of the clinical impression and other laboratory testing  Testing reported as "Positive" or "Negative" has been proven to be accurate according to standard laboratory validation requirements  All testing is performed with control materials showing appropriate reactivity at standard intervals      Troponin I [[id number]]  (Normal) Collected: 01/11/21 1225    Lab Status: Final result Specimen: Blood from Arm, Right Updated: 01/11/21 1309     Troponin I <0 02 ng/mL     Basic metabolic panel [[id number]]  (Abnormal) Collected: 01/11/21 1225    Lab Status: Final result Specimen: Blood from Arm, Right Updated: 01/11/21 1257     Sodium 130 mmol/L      Potassium 3 9 mmol/L      Chloride 101 mmol/L      CO2 22 mmol/L      ANION GAP 7 mmol/L      BUN 25 mg/dL      Creatinine 1 71 mg/dL      Glucose 113 mg/dL      Calcium 8 7 mg/dL      eGFR 34 ml/min/1 73sq m     Narrative:      [place] guidelines for Chronic Kidney Disease (CKD):     Stage 1 with normal or high GFR (GFR > 90 mL/min/1 73 square meters)    Stage 2 Mild CKD (GFR = 60-89 mL/min/1 73 square meters)    Stage 3A Moderate CKD (GFR = 45-59 mL/min/1 73 square meters)    Stage 3B Moderate CKD (GFR = 30-44 mL/min/1 73 square meters)    Stage 4 Severe CKD (GFR = 15-29 mL/min/1 73 square meters)    Stage 5 End Stage CKD (GFR <15 mL/min/1 73 square meters)  Note: GFR calculation is accurate only with a steady state creatinine    CBC and differential [[id number]]  (Abnormal) Collected: 01/11/21 1225    Lab Status: Final result Specimen: Blood from Arm, Right Updated: 01/11/21 1236     WBC 12 41 Thousand/uL      RBC 2 74 Million/uL      Hemoglobin 8 3 g/dL      Hematocrit 25 7 %      MCV 94 fL      MCH 30 3 pg      MCHC 32 3 g/dL      RDW 14 2 %      MPV 9 3 fL      Platelets 010 Thousands/uL      nRBC 0 /100 WBCs      Neutrophils Relative 86 %      Immat GRANS % 1 %      Lymphocytes Relative 6 %      Monocytes Relative 6 %      Eosinophils Relative 1 %      Basophils Relative 0 %      Neutrophils Absolute 10 67 Thousands/µL      Immature Grans Absolute 0 06 Thousand/uL      Lymphocytes Absolute 0 75 Thousands/µL      Monocytes Absolute 0 73 Thousand/µL      Eosinophils Absolute 0 16 Thousand/µL      Basophils Absolute 0 04 Thousands/µL     Urine culture [[id number]] Collected: 01/11/21 1225    Lab Status: In process Specimen: Urine, Other Updated: 01/11/21 1229    Blood culture #2 [[id number]] Collected: 01/10/21 2121    Lab Status: Preliminary result Specimen: Blood from Arm, Right Updated: 01/11/21 0901     Blood Culture Received in Microbiology Lab  Culture in Progress  Troponin I [[id number]]  (Normal) Collected: 01/11/21 0824    Lab Status: Final result Specimen: Blood from Arm, Right Updated: 01/11/21 0900     Troponin I <0 02 ng/mL     Procalcitonin Reflex [[id number]]  (Abnormal) Collected: 01/11/21 0454    Lab Status: Final result Specimen: Blood from Arm, Right Updated: 01/11/21 0544     Procalcitonin 0 76 ng/ml     TSH, 3rd generation [[id number]]  (Normal) Collected: 01/11/21 0450    Lab Status: Final result Specimen: Blood from Arm, Right Updated: 01/11/21 0543     TSH 3RD GENERATON 1 170 uIU/mL     Narrative:      Patients undergoing fluorescein dye angiography may retain small amounts of fluorescein in the body for 48-72 hours post procedure  Samples containing fluorescein can produce falsely depressed TSH values  If the patient had this procedure,a specimen should be resubmitted post fluorescein clearance        Magnesium [[id number]] (Normal) Collected: 01/11/21 0450    Lab Status: Final result Specimen: Blood from Arm, Right Updated: 01/11/21 0543     Magnesium 1 6 mg/dL     Phosphorus [[id number]]  (Normal) Collected: 01/11/21 0450    Lab Status: Final result Specimen: Blood from Arm, Right Updated: 01/11/21 0543     Phosphorus 3 7 mg/dL     Troponin I [[id number]]  (Normal) Collected: 01/11/21 0449    Lab Status: Final result Specimen: Blood from Arm, Right Updated: 01/11/21 0528     Troponin I <0 02 ng/mL     Platelet count [[id number]]  (Normal) Collected: 01/11/21 0449    Lab Status: Final result Specimen: Blood from Arm, Right Updated: 01/11/21 0517     Platelets 801 Thousands/uL      MPV 9 3 fL     Blood culture #1 [[id number]] Collected: 01/10/21 2017    Lab Status: Preliminary result Specimen: Blood from Arm, Left Updated: 01/11/21 0101     Blood Culture Received in Microbiology Lab  Culture in Progress      Urine Microscopic [[id number]]  (Abnormal) Collected: 01/10/21 2151    Lab Status: Final result Specimen: Urine, Straight Cath Updated: 01/10/21 2315     RBC, UA None Seen /hpf      WBC, UA 2-4 /hpf      Epithelial Cells Moderate /hpf      Bacteria, UA Occasional /hpf      Fine granular casts 3-5 /lpf      AMORPH URATES Innumerable /hpf     UA w Reflex to Microscopic w Reflex to Culture [[id number]]  (Abnormal) Collected: 01/10/21 2151    Lab Status: Final result Specimen: Urine, Straight Cath Updated: 01/10/21 2244     Color, UA Yellow     Clarity, UA Cloudy     Specific Gravity, UA 1 018     pH, UA 5 5     Leukocytes, UA Negative     Nitrite, UA Negative     Protein,  (2+) mg/dl      Glucose, UA Negative mg/dl      Ketones, UA Negative mg/dl      Urobilinogen, UA 2 0 E U /dl      Bilirubin, UA Negative     Blood, UA Negative    Procalcitonin with AM Reflex [[id number]]  (Abnormal) Collected: 01/10/21 2121    Lab Status: Final result Specimen: Blood from Arm, Right Updated: 01/10/21 2215     Procalcitonin 0 28 ng/ml     Protime-INR [[id number]]  (Normal) Collected: 01/10/21 2121    Lab Status: Final result Specimen: Blood from Arm, Right Updated: 01/10/21 2143     Protime 14 3 seconds      INR 1 11    APTT [[id number]]  (Abnormal) Collected: 01/10/21 2121    Lab Status: Final result Specimen: Blood from Arm, Right Updated: 01/10/21 2143     PTT 44 seconds     CBC and differential [[id number]]  (Abnormal) Collected: 01/10/21 2017    Lab Status: Final result Specimen: Blood from Arm, Left Updated: 01/10/21 2114     WBC 20 04 Thousand/uL      RBC 3 01 Million/uL      Hemoglobin 9 1 g/dL      Hematocrit 27 8 %      MCV 92 fL      MCH 30 2 pg      MCHC 32 7 g/dL      RDW 14 1 %      MPV 10 0 fL      Platelets 088 Thousands/uL      nRBC 0 /100 WBCs     Narrative: This is an appended report  These results have been appended to a previously verified report      Manual Differential(PHLEBS Do Not Order) [[id number]]  (Abnormal) Collected: 01/10/21 2017    Lab Status: Final result Specimen: Blood from Arm, Left Updated: 01/10/21 2114     Segmented % 90 %      Lymphocytes % 4 %      Monocytes % 4 %      Eosinophils, % 0 %      Basophils % 1 %      Atypical Lymphocytes % 1 %      Absolute Neutrophils 18 04 Thousand/uL      Lymphocytes Absolute 0 80 Thousand/uL      Monocytes Absolute 0 80 Thousand/uL      Eosinophils Absolute 0 00 Thousand/uL      Basophils Absolute 0 20 Thousand/uL      Total Counted --     RBC Morphology Present     Anisocytosis Present     Platelet Estimate Adequate    Comprehensive metabolic panel [[id number]]  (Abnormal) Collected: 01/10/21 2017    Lab Status: Final result Specimen: Blood from Arm, Left Updated: 01/10/21 2056     Sodium 129 mmol/L      Potassium 4 8 mmol/L      Chloride 98 mmol/L      CO2 23 mmol/L      ANION GAP 8 mmol/L      BUN 30 mg/dL      Creatinine 2 13 mg/dL      Glucose 119 mg/dL      Calcium 8 7 mg/dL      Corrected Calcium 10 4 mg/dL      AST 42 U/L      ALT 28 U/L      Alkaline Phosphatase 169 U/L Total Protein 7 3 g/dL      Albumin 1 9 g/dL      Total Bilirubin 0 57 mg/dL      eGFR 26 ml/min/1 73sq m     Narrative:      [place] guidelines for Chronic Kidney Disease (CKD):     Stage 1 with normal or high GFR (GFR > 90 mL/min/1 73 square meters)    Stage 2 Mild CKD (GFR = 60-89 mL/min/1 73 square meters)    Stage 3A Moderate CKD (GFR = 45-59 mL/min/1 73 square meters)    Stage 3B Moderate CKD (GFR = 30-44 mL/min/1 73 square meters)    Stage 4 Severe CKD (GFR = 15-29 mL/min/1 73 square meters)    Stage 5 End Stage CKD (GFR <15 mL/min/1 73 square meters)  Note: GFR calculation is accurate only with a steady state creatinine    Troponin I [[id number]]  (Normal) Collected: 01/10/21 2017    Lab Status: Final result Specimen: Blood from Arm, Left Updated: 01/10/21 2046     Troponin I <0 02 ng/mL     Lactic Acid [[id number]]  (Normal) Collected: 01/10/21 2017    Lab Status: Final result Specimen: Blood from Arm, Left Updated: 01/10/21 2046     LACTIC ACID 1 8 mmol/L     Narrative:      Result may be elevated if tourniquet was used during collection  XR chest 1 view   Final Result by Kaye Esquivel MD (01/11 1032)      In this patient with confirmed COVID-19 infection, new or worsening peripheral right lung infiltrate noted suggesting viral pneumonia        Interval improvement of previously seen left lung infiltrate                     Workstation performed: [id number]               Procedures  ECG 12 Lead Documentation Only    Date/Time: 1/10/2021 8:01 PM  Performed by: Kirk Buenrostro MD  Authorized by: Kirk Buenrostro MD     Indications / Diagnosis:  Syncope  ECG reviewed by me, the ED Provider: yes    Patient location:  ED  Previous ECG:     Previous ECG:  Compared to current    Comparison ECG info:  Normal sinus rhythm    Similarity:  Changes noted  Interpretation:     Interpretation: abnormal    Rate:     ECG rate:  109    ECG rate assessment: tachycardic Rhythm:     Rhythm: sinus tachycardia    Ectopy:     Ectopy: none    QRS:     QRS axis:  Normal    QRS intervals:  Normal  Conduction:     Conduction: normal    ST segments:     ST segments:  Normal  T waves:     T waves: normal    Comments:      Normal sinus rhythm with no acute ischemic changes  There is no delta wave, Brugada sign, QT prolongation, or left ventricular hypertrophy  ED Course  ED Course as of Jan 11 2328   Shelia Davila [place] 10, 2021   2030 WBC(!): 20 04   2053 LACTIC ACID: 1 8   2104 Sodium(!): 129   2104 Creatinine(!): 2 13   2104 baseline   eGFR: 26               Identification of Seniors at Risk      Most Recent Value   (ISAR) Identification of Seniors at Risk   Before the illness or injury that brought you to the Emergency, did you need someone to help you on a regular basis? 1 Filed at: 01/10/2021 1958   In the last 24 hours, have you needed more help than usual?  0 Filed at: 01/10/2021 1958   Have you been hospitalized for one or more nights during the past 6 months? 1 Filed at: 01/10/2021 1958   In general, do you see well?  0 Filed at: 01/10/2021 1958   In general, do you have serious problems with your memory? 1 Filed at: 01/10/2021 1958   Do you take more than three different medications every day?   1 Filed at: 01/10/2021 1958   ISAR Score  4 Filed at: 01/10/2021 1958            MDM  Number of Diagnoses or Management Options  HANNA (acute kidney injury) ([facility] [address] ): new and requires workup  Right middle lobe pneumonia: new and requires workup  Syncope: new and requires workup     Amount and/or Complexity of Data Reviewed  Clinical lab tests: ordered and reviewed  Tests in the radiology section of CPT®: ordered and reviewed  Tests in the medicine section of CPT®: ordered and reviewed  Decide to obtain previous medical records or to obtain history from someone other than the patient: yes  Review and summarize past medical records: yes  Discuss the patient with other providers: yes  Independent visualization of images, tracings, or specimens: yes    Patient Progress  Patient progress: stable     80year-old gentleman here after syncopal episode and tachycardia with borderline fever  Patient is generally well appearing, nontoxic  EKG is normal sinus rhythm with no danger/red flag signs associated with syncope  The patient was evaluated for sepsis given tachycardia with leukocytosis discovered on CBC  The patient does have an elevated procalcitonin but no obvious source of infection  Chest x-ray does show some evidence of possible viral pneumonia, although this is complicated by patient's recent diagnosis of COVID-19  Urinalysis is clean  Patient does have evidence of acute kidney injury  He is receiving IV crystalloid  Empiric antibiotic coverage with ceftriaxone given criteria for severe sepsis per hospital protocol  Patient will require admission to the hospital on telemetry for syncopal episode, ongoing fluid resuscitation for acute kidney injury, further workup and monitoring for patient's borderline fever and tachycardia  Disposition  Final diagnoses:   Syncope   [facility]   Right middle lobe pneumonia     Time reflects when diagnosis was documented in both MDM as applicable and the Disposition within this note     Time User Action Codes Description Comment    1/10/2021 10:22 PM Lisa Line Add [R55] Syncope     1/10/2021 10:25 PM Lisa Line Add [N17 9] HANNA (acute kidney injury) ([facility] [address] )     1/10/2021 10:25 PM Lisa Line Add [J18 9] Right middle lobe pneumonia       ED Disposition     ED Disposition Condition Date/Time Comment    Admit Stable [place] 10, 2021 10:23 PM Case was discussed with Dr Tavares Hamilton and the patient's admission status was agreed to be Admission Status: inpatient status to the service of Dr Tavares Hamilton          Follow-up Information    None         Current Discharge Medication List      CONTINUE these medications which have NOT CHANGED Details   amLODIPine (NORVASC) 10 mg tablet Take 1 tablet (10 mg total) by mouth daily  Qty: 90 tablet, Refills: 3    Associated Diagnoses: Essential hypertension      aspirin (ECOTRIN LOW STRENGTH) 81 mg EC tablet Take 81 mg by mouth daily      B COMPLEX-C-FOLIC ACID PO Take 1 tablet by mouth daily      Cholecalciferol (VITAMIN D3) 1000 units CAPS Take 5,000 Units by mouth      fluticasone (FLONASE) 50 mcg/act nasal spray       hydrALAZINE (APRESOLINE) 10 mg tablet       lisinopril (ZESTRIL) 5 mg tablet Take 2 tablets (10 mg total) by mouth daily    Associated Diagnoses: Essential hypertension      multivitamin-minerals (CENTRUM ADULTS) tablet Take 1 tablet by mouth daily  Refills: 0    Associated Diagnoses: COVID-19 virus infection      Omega-3 Fatty Acids (FISH OIL) 1200 MG CAPS Take 1 capsule by mouth daily      omeprazole (PriLOSEC) 20 mg delayed release capsule Take 1 capsule (20 mg total) by mouth daily  Qty: 90 capsule, Refills: 3    Associated Diagnoses: Gastroesophageal reflux disease, esophagitis presence not specified      pravastatin (PRAVACHOL) 40 mg tablet Take 40 mg by mouth daily ALTERNATES 20MG AND 40MG EVERY OTHER DAY           No discharge procedures on file  PDMP Review     None           ED Provider  Attending physically available and evaluated Rupinder Zelda TAVARES managed the patient along with the ED Attending      Electronically Signed by         Dante Sweeney MD  01/11/21 4010

## 2021-01-11 NOTE — ASSESSMENT & PLAN NOTE
- BP currently controlled  - Lisinopril discontinued in the hospital  - Continue Amlodipine 10 mg daily  - Will monitor vital signs, electrolytes and renal function

## 2021-01-11 NOTE — PROGRESS NOTES
Progress Note Maritza Melendez 2/16/1927, 80 y o  male MRN: [id number]    Unit/Bed#: ED 10 Encounter: [id number]    Primary Care Provider: Devon Levin MD   Date and time admitted to hospital: 1/10/2021  7:46 PM        * Syncope  Assessment & Plan  · Orthostatsis vs dehydration vs acute moderate hyponatremia  Does have a hx of an occipital stroke although physical exam is nonfocal  May also be related to possible underlying infection  · Already received 1 L normal saline bolus  · TSH normal 1 17  · Last echo on November 8, 2020 with an ejection fraction of 83%, grade 1 diastolic dysfunction, mild aortic stenosis and mild aortic aneurysmal dilation of 4 0 cm  Plan:  · Continue normal saline 100 cc/hours  · Continue ceftriaxone  · Follow-up blood cultures  · Repeat BMP to check sodium level  · Check EKG  · Monitor on telemetry  · Check orthostatic      Fever in adult  Assessment & Plan  · Patient with mild fever here in emergency room  Documented T-max of 100 1°  · WBC is 20  · Procalcitonin is up trending from 0 28-0 76  · Chest x-ray obtained and reviewed-shows a right upper lung field opacification with increased right-sided interstitial markings in the mid to lower lung field  Left lung field appears clear and much improved compared to prior chest x-ray  · He is saturating 94% on room air however he is tachycardic  · UA obtained in shows granular casts, bacteria, with 2-4 wbc's  · Of note he was tested positive for COVID on December 16 and was treated with vitamins, 10 days of Decadron, completed remdesivir, and 7 days of ceftriaxone and discharged on December 28   He was weaned to room air on December 25th  Plan:  · Continue IV fluids 100 cc/hour  · Continue ceftriaxone  · Recheck CBC to assess WBC  · Will send for urine culture  · Follow-up blood culture    Acute renal failure superimposed on stage 3 chronic kidney disease [facility])  Assessment & Plan  Lab Results   Component Value Date    EGFR 26 01/10/2021    EGFR 32 12/27/2020    EGFR 30 12/26/2020    CREATININE 2 13 (H) 01/10/2021    CREATININE 1 81 (H) 12/27/2020    CREATININE 1 89 (H) 12/26/2020   · Dehydration, versus infection, versus urinary retention  · UA with granular casts in some bacteria  Plan:  · continue IV fluids 100 cc/hour  · Monitor urine output  · Check bladder scan  · Check urine culture  · Hold lisinopril    History of stroke  Assessment & Plan  · History of right occipital stroke  · No focal deficits  · Also has a history of a carotid endarterectomy  Plan:  Continue aspirin  Continue pravastatin    Hyponatremia  Assessment & Plan  · Sodium 129 on admission  It was 135 on his last discharge on December 27  Plan:    · Will check a BMP  · If still hyponatremic, will send for a serum Osm  · Continue normal saline 100 cc/hour    GERD (gastroesophageal reflux disease)  Assessment & Plan  Continue omeprazole  HLD (hyperlipidemia)  Assessment & Plan  Currently on Pravachol 40 mg daily  Essential hypertension  Assessment & Plan  On lisinopril, hydralazine, amlodipine  Plan:  Continue hydralazine and amlodipine  Hold lisinopril in the setting of HANNA    Orthostasisresolved as of 1/11/2021  Assessment & Plan  Check orthostatic blood pressures  Telemetry  Input output  VTE Pharmacologic Prophylaxis:   Pharmacologic: Heparin  Mechanical VTE Prophylaxis in Place: Yes    Patient Centered Rounds: I have performed bedside rounds with nursing staff today  Discussions with Specialists or Other Care Team Provider: nursing    Education and Discussions with Family / Patient: patient    Time Spent for Care: 30 minutes  More than 50% of total time spent on counseling and coordination of care as described above      Current Length of Stay: 1 day(s)    Current Patient Status: Inpatient   Certification Statement: The patient will continue to require additional inpatient hospital stay due to elevated WBC, fever, acute kidney injury    Discharge Plan: pending completion of evaluation and management  Resident alyssa Gonzalez    Code Status: Level 3 - DNAR and DNI      Subjective:   Patient seen and examined bedside  Overall feels well, eating breakfast, not hypoxic, or shortness of breath, denies any chest pain, reports difficulty with urinating however denies any dysuria  Does not feel lightheadedness  Objective:     Vitals:   Temp (24hrs), Av 1 °F (37 8 °C), Min:100 1 °F (37 8 °C), Max:100 1 °F (37 8 °C)    Temp:  [100 1 °F (37 8 °C)] 100 1 °F (37 8 °C)  HR:  [] 90  Resp:  [13-26] 15  BP: (131-153)/(60-81) 153/81  SpO2:  [94 %-95 %] 94 %  Body mass index is 21 74 kg/m²  Input and Output Summary (last 24 hours): Intake/Output Summary (Last 24 hours) at 2021 0900  Last data filed at 1/10/2021 2219  Gross per 24 hour   Intake 1088 33 ml   Output 150 ml   Net 938 33 ml       Physical Exam:     Physical Exam  Constitutional:       General: He is not in acute distress  Appearance: He is not diaphoretic  HENT:      Head: Normocephalic and atraumatic  Nose: Nose normal       Mouth/Throat:      Pharynx: No oropharyngeal exudate  Eyes:      General: No scleral icterus  Conjunctiva/sclera: Conjunctivae normal       Pupils: Pupils are equal, round, and reactive to light  Neck:      Musculoskeletal: Normal range of motion and neck supple  Thyroid: No thyromegaly  Vascular: No JVD  Trachea: No tracheal deviation  Cardiovascular:      Rate and Rhythm: Regular rhythm  Tachycardia present  Heart sounds: Normal heart sounds  No murmur  No friction rub  No gallop  Pulmonary:      Effort: Pulmonary effort is normal  No respiratory distress  Breath sounds: Normal breath sounds  No stridor  No wheezing or rales  Abdominal:      General: Bowel sounds are normal  There is no distension  Palpations: Abdomen is soft  Tenderness: There is no abdominal tenderness   There is no guarding or rebound  Musculoskeletal: Normal range of motion  General: No deformity  Lymphadenopathy:      Cervical: No cervical adenopathy  Skin:     General: Skin is warm  Coloration: Skin is pale  Findings: No erythema or rash  Neurological:      Mental Status: He is alert and oriented to person, place, and time  Cranial Nerves: No cranial nerve deficit  Sensory: No sensory deficit  Additional Data:     Labs:    Results from last 7 days   Lab Units 01/11/21  0449 01/10/21  2017   WBC Thousand/uL  --  20 04*   HEMOGLOBIN g/dL  --  9 1*   HEMATOCRIT %  --  27 8*   PLATELETS Thousands/uL 248 284   LYMPHO PCT %  --  4*   MONO PCT %  --  4   EOS PCT %  --  0     Results from last 7 days   Lab Units 01/10/21  2017   SODIUM mmol/L 129*   POTASSIUM mmol/L 4 8   CHLORIDE mmol/L 98*   CO2 mmol/L 23   BUN mg/dL 30*   CREATININE mg/dL 2 13*   ANION GAP mmol/L 8   CALCIUM mg/dL 8 7   ALBUMIN g/dL 1 9*   TOTAL BILIRUBIN mg/dL 0 57   ALK PHOS U/L 169*   ALT U/L 28   AST U/L 42   GLUCOSE RANDOM mg/dL 119     Results from last 7 days   Lab Units 01/10/21  2121   INR  1 11             Results from last 7 days   Lab Units 01/11/21  0454 01/10/21  2121 01/10/21  2017   LACTIC ACID mmol/L  --   --  1 8   PROCALCITONIN ng/ml 0 76* 0 28*  --            * I Have Reviewed All Lab Data Listed Above  * Additional Pertinent Lab Tests Reviewed: Lili 66 Admission Reviewed    Imaging:    Imaging Reports Reviewed Today Include:  Chest x-ray  Imaging Personally Reviewed by Myself Includes:  Chest x-ray    Recent Cultures (last 7 days):     Results from last 7 days   Lab Units 01/10/21  2017   BLOOD CULTURE  Received in Microbiology Lab  Culture in Progress         Last 24 Hours Medication List:   Current Facility-Administered Medications   Medication Dose Route Frequency Provider Last Rate    aluminum-magnesium hydroxide-simethicone  30 mL Oral Q6H PRN Alejandra Che MD      amLODIPine 10 mg Oral Daily Gerhardt Norton, MD      aspirin  81 mg Oral Daily Gerhardt Norton, MD      b complex-vitamin C-folic acid  1 capsule Oral Daily With Alejandro Sheridan MD      cefTRIAXone  1,000 mg Intravenous Q24H Gerhardt Norton, MD      cholecalciferol  1,000 Units Oral Daily Gerhardt Norton, MD      docusate sodium  100 mg Oral BID Gerhardt Norton, MD      fish oil  1,000 mg Oral Daily Gerhardt Norton, MD      fluticasone  1 spray Each Nare Daily Gerhardt Norton, MD      heparin (porcine)  5,000 Units Subcutaneous [facility] [address] Gerhardt Norton, MD      hydrALAZINE  10 mg Oral Daily Gerhardt Norton, MD      lisinopril  10 mg Oral Daily Gerhardt Norton, MD      multivitamin-minerals  1 tablet Oral Daily Gerhardt Norton, MD      ondansetron  4 mg Intravenous Q6H PRN Gerhardt Norton, MD      pantoprazole  20 mg Oral Early Morning Gerhardt Norton, MD      pravastatin  40 mg Oral Daily With Alejandro Sheridan MD      sodium chloride (PF)  3 mL Intravenous Q1H PRN Gerhardt Norton, MD      sodium chloride  100 mL/hr Intravenous Continuous Gerhardt Norton,  mL/hr (01/11/21 4226)        Today, Patient Was Seen By: Danella Aschoff, MD    ** Please Note: Dictation voice to text software may have been used in the creation of this document   **

## 2021-01-11 NOTE — ASSESSMENT & PLAN NOTE
Patient with mild fever here in emergency room  Procalcitonin is still pending  Urinalysis just sent  Patient was started Rocephin here in the emergency room and we decided to continue pending further studies  Urinalysis with culture  Blood culture sent  Procalcitonin for tomorrow  In the event the patient remains afebrile and procalcitonin normal; if cultures negative, consider discontinuation  Patient may have mild fever due to dehydration? Will continue with hydration

## 2021-01-11 NOTE — ASSESSMENT & PLAN NOTE
· Sodium 129 on admission    It was 135 on his last discharge on December 27  Plan:    · Will check a BMP  · If still hyponatremic, will send for a serum Osm  · Continue normal saline 100 cc/hour

## 2021-01-11 NOTE — ASSESSMENT & PLAN NOTE
· History of right occipital stroke  · No focal deficits  · Also has a history of a carotid endarterectomy  Plan:  Continue aspirin  Continue pravastatin

## 2021-01-11 NOTE — ASSESSMENT & PLAN NOTE
- Patient progressing with PT/OT services, he is walking 100 feet with RW CG  - Continue PT/OT services  - Fall precautions advised

## 2021-01-11 NOTE — ASSESSMENT & PLAN NOTE
- Chronic back pain reported  - Will order icy hot topical gel to be applied to his back two times daily and two times daily prn  - Continue Tylenol and PT/OT services

## 2021-01-11 NOTE — UTILIZATION REVIEW
Initial Clinical Review    Admission: Date/Time/Statement:   Admission Orders (From admission, onward)     Ordered        01/10/21 2203  Inpatient Admission  Once                   Orders Placed This Encounter   Procedures    Inpatient Admission     Standing Status:   Standing     Number of Occurrences:   1     Order Specific Question:   Admitting Physician     Answer:   Melisa Abrams [[id number]]     Order Specific Question:   Level of Care     Answer:   Med Surg [[id number]]     Order Specific Question:   Estimated length of stay     Answer:   More than 2 Midnights     Order Specific Question:   Certification     Answer:   I certify that inpatient services are medically necessary for this patient for a duration of greater than two midnights  See H&P and MD Progress Notes for additional information about the patient's course of treatment  ED Arrival Information     Expected Arrival Acuity Means of Arrival Escorted By Service Admission Type    - 1/10/2021 19:46 Urgent Ambulance [facility] Emergency Medicine Urgent    Arrival Complaint    syncope        Chief Complaint   Patient presents with    Syncope     pt presents afer a syncopal episode while going to bathroom with nursing aides at assisted living facility  once EMS arrived pt was alert adn responsive  pt has no complaints at this time     Assessment/Plan: 79 yo m with a pmh of ambulatory dysfunction, HTN, HLD, GERD, and dementia,  Prior history of orthostasis, syncope and CKD, and COVID positive  In December 2020  He was sent to the ED from his SNF due to a syncopal episode, he was being assisted in ambulating to the bathroom when he became limp and appeared to lose consciousness, he was lowered to the ground, he did return to his prior baseline afterward    He was febrile on arrival 100 1,  Urine and blood cultures done , he was started on IV abx's           ED Triage Vitals   Temperature Pulse Respirations Blood Pressure SpO2   01/10/21 2014 01/10/21 1956 01/10/21 1956 01/10/21 1956 01/10/21 1956   100 1 °F (37 8 °C) (!) 116 18 143/68 95 %      Temp Source Heart Rate Source Patient Position - Orthostatic VS BP Location FiO2 (%)   01/10/21 2014 01/10/21 1956 01/10/21 1956 01/10/21 1956 --   Rectal Monitor Lying Right arm       Pain Score       01/11/21 0300       No Pain          Wt Readings from Last 1 Encounters:   01/10/21 64 9 kg (143 lb)     Additional Vital Signs:   Date/Time  Temp  Pulse  Resp  BP  MAP (mmHg)  SpO2  Calculated FIO2 (%) - Nasal Cannula  Nasal Cannula O2 Flow Rate (L/min)  O2 Device  Patient Position - Orthostatic VS   01/11/21 0700    90  15  153/81  105  94 %      None (Room air)  Lying   01/11/21 0500    86  20  132/60  87  94 %  28  2 L/min  Nasal cannula  Lying   01/11/21 0300    96  20  145/68    94 %  28  2 L/min  None (Room air)  Lying   01/11/21 0230    88  26Abnormal   150/77  106             01/11/21 0200    90  13  131/80  101             01/10/21 2100    98  14  138/75  98  94 %  28  2 L/min  Nasal cannula  Sitting           Pertinent Labs/Diagnostic Test Results:       Results from last 7 days   Lab Units 01/11/21  0449 01/10/21  2017   WBC Thousand/uL  --  20 04*   HEMOGLOBIN g/dL  --  9 1*   HEMATOCRIT %  --  27 8*   PLATELETS Thousands/uL 248 284         Results from last 7 days   Lab Units 01/11/21  0450 01/10/21  2017   SODIUM mmol/L  --  129*   POTASSIUM mmol/L  --  4 8   CHLORIDE mmol/L  --  98*   CO2 mmol/L  --  23   ANION GAP mmol/L  --  8   BUN mg/dL  --  30*   CREATININE mg/dL  --  2 13*   EGFR ml/min/1 73sq m  --  26   CALCIUM mg/dL  --  8 7   MAGNESIUM mg/dL 1 6  --    PHOSPHORUS mg/dL 3 7  --      Results from last 7 days   Lab Units 01/10/21  2017   AST U/L 42   ALT U/L 28   ALK PHOS U/L 169*   TOTAL PROTEIN g/dL 7 3   ALBUMIN g/dL 1 9*   TOTAL BILIRUBIN mg/dL 0 57     Results from last 7 days   Lab Units 01/10/21  2017   GLUCOSE RANDOM mg/dL 119     Results from last 7 days   Lab Units 01/11/21  0449 01/10/21  2017   TROPONIN I ng/mL <0 02 <0 02         Results from last 7 days   Lab Units 01/10/21  2121   PROTIME seconds 14 3   INR  1 11   PTT seconds 44*     Results from last 7 days   Lab Units 01/11/21  0450   TSH 3RD GENERATON uIU/mL 1 170     Results from last 7 days   Lab Units 01/11/21  0454 01/10/21  2121   PROCALCITONIN ng/ml 0 76* 0 28*     Results from last 7 days   Lab Units 01/10/21  2017   LACTIC ACID mmol/L 1 8       Results from last 7 days   Lab Units 01/10/21  2151   CLARITY UA  Cloudy   COLOR UA  Yellow   SPEC GRAV UA  1 018   PH UA  5 5   GLUCOSE UA mg/dl Negative   KETONES UA mg/dl Negative   BLOOD UA  Negative   PROTEIN UA mg/dl 100 (2+)*   NITRITE UA  Negative   BILIRUBIN UA  Negative   UROBILINOGEN UA E U /dl 2 0*   LEUKOCYTES UA  Negative   WBC UA /hpf 2-4   RBC UA /hpf None Seen   BACTERIA UA /hpf Occasional   EPITHELIAL CELLS WET PREP /hpf Moderate*     Results from last 7 days   Lab Units 01/10/21  2017   BLOOD CULTURE  Received in Microbiology Lab  Culture in Progress       CXR 1/10 - pending read         ED Treatment:   Medication Administration from 01/10/2021 1946 to 01/11/2021 1992       Date/Time Order Dose Route Action Comments     01/10/2021 2121 sodium chloride 0 9 % bolus 1,000 mL 1,000 mL Intravenous New Bag      01/10/2021 2126 ceftriaxone (ROCEPHIN) 1 g/50 mL in dextrose IVPB 1,000 mg Intravenous New Bag      01/11/2021 0618 pantoprazole (PROTONIX) EC tablet 20 mg 20 mg Oral Given      01/11/2021 0618 heparin (porcine) subcutaneous injection 5,000 Units 5,000 Units Subcutaneous Given      01/11/2021 0457 sodium chloride 0 9 % infusion 100 mL/hr Intravenous New Bag         Past Medical History:   Diagnosis Date    HANNA [facility]) ([facility] [address] ) 6/20/2017    Arthritis     Cataracts, both eyes     HCAP (healthcare-associated pneumonia) 10/24/2019    Hyperlipidemia     Hypertension     Problems with hearing     Severe sepsis ([facility] [address] ) 6/20/2017    [facility]     Syncope 10/23/2019     Present on Admission:   GERD (gastroesophageal reflux disease)   Orthostasis   Acute renal failure superimposed on stage 3 chronic kidney disease (HCC)   HLD (hyperlipidemia)   Syncope   Essential hypertension      Admitting Diagnosis: Syncope [R55]  Age/Sex: 80 y o  male       Admission Orders:  Scheduled Medications:  amLODIPine, 10 mg, Oral, Daily  aspirin, 81 mg, Oral, Daily  b complex-vitamin C-folic acid, 1 capsule, Oral, Daily With Dinner  cefTRIAXone, 1,000 mg, Intravenous, Q24H  cholecalciferol, 1,000 Units, Oral, Daily  docusate sodium, 100 mg, Oral, BID  fish oil, 1,000 mg, Oral, Daily  fluticasone, 1 spray, Each Nare, Daily  heparin (porcine), 5,000 Units, Subcutaneous, Q8H SENIA  hydrALAZINE, 10 mg, Oral, Daily  lisinopril, 10 mg, Oral, Daily  multivitamin-minerals, 1 tablet, Oral, Daily  pantoprazole, 20 mg, Oral, Early Morning  pravastatin, 40 mg, Oral, Daily With Dinner      Continuous IV Infusions:  sodium chloride, 100 mL/hr, Intravenous, Continuous      PRN Meds:  aluminum-magnesium hydroxide-simethicone, 30 mL, Oral, Q6H PRN  ondansetron, 4 mg, Intravenous, Q6H PRN  sodium chloride (PF), 3 mL, Intravenous, Q1H PRN      Nursing Orders - VS - Telem  -neuro checks q 4 - incentive spirometry - daily weights - I & O q shift - SCD's to le's - up with assistance  - diet cardiac         Network Utilization Review Department  ATTENTION: Please call with any questions or concerns to [phone] and carefully listen to the prompts so that you are directed to the right person  All voicemails are confidential   [employer] all requests for admission clinical reviews, approved or denied determinations and any other requests to dedicated fax number below belonging to the campus where the patient is receiving treatment   List of dedicated fax numbers for the Facilities:  FACILITY NAME UR FAX NUMBER   ADMISSION DENIALS (Administrative/Medical Necessity) [phone]   PARENT CHILD HEALTH (Maternity/NICU/Pediatrics) [address] [facility] [facility] [address] [facility] [address] (  Dariusz Contreras "[address]ian[address]) [address] [place] [address] Annetta Melendez 1481 [phone]   [address] [phone]

## 2021-01-11 NOTE — ASSESSMENT & PLAN NOTE
Syncope may be secondary to orthostasis or dehydration; will continue to check orthostatic blood pressures  Minor syncope workup  Telemetry  Troponin x3

## 2021-01-11 NOTE — ASSESSMENT & PLAN NOTE
Lab Results   Component Value Date    EGFR 26 01/10/2021    EGFR 32 12/27/2020    EGFR 30 12/26/2020    CREATININE 2 13 (H) 01/10/2021    CREATININE 1 81 (H) 12/27/2020    CREATININE 1 89 (H) 12/26/2020   Continue intravenous fluids sodium chloride 0 9% 100 mL/hr  Input and output  Metabolic profile for tomorrow

## 2021-01-11 NOTE — H&P
H&P- Giuseppe Youngblood 2/16/1927, 80 y o  male MRN: [id number]    Unit/Bed#: ED 10 Encounter: [id number]    Primary Care Provider: Rick Leon MD   Date and time admitted to hospital: 1/10/2021  7:46 PM        * Fever in adult  Assessment & Plan  Patient with mild fever here in emergency room  Procalcitonin is still pending  Urinalysis just sent  Patient was started Rocephin here in the emergency room and we decided to continue pending further studies  Urinalysis with culture  Blood culture sent  Procalcitonin for tomorrow  In the event the patient remains afebrile and procalcitonin normal; if cultures negative, consider discontinuation  Patient may have mild fever due to dehydration? Will continue with hydration  Acute renal failure superimposed on stage 3 chronic kidney disease [facility])  Assessment & Plan  Lab Results   Component Value Date    EGFR 26 01/10/2021    EGFR 32 12/27/2020    EGFR 30 12/26/2020    CREATININE 2 13 (H) 01/10/2021    CREATININE 1 81 (H) 12/27/2020    CREATININE 1 89 (H) 12/26/2020   Continue intravenous fluids sodium chloride 0 9% 100 mL/hr  Input and output  Metabolic profile for tomorrow  Syncope  Assessment & Plan  Syncope may be secondary to orthostasis or dehydration; will continue to check orthostatic blood pressures  Minor syncope workup  Telemetry  Troponin x3  Orthostasis  Assessment & Plan  Check orthostatic blood pressures  Telemetry  Input output  GERD (gastroesophageal reflux disease)  Assessment & Plan  Continue omeprazole  HLD (hyperlipidemia)  Assessment & Plan  Currently on Pravachol 40 mg daily  Essential hypertension  Assessment & Plan  Continue Zestril 10 mg daily  Hydralazine 10 mg daily  Amlodipine 10 mg daily            VTE Prophylaxis: Heparin  / sequential compression device   Code Status: Prior DNR level 3 as per papers from [address]: There is no POLST form on file for this patient (pre-hospital)    Anticipated Length of Stay:  Patient will be admitted on an Inpatient basis with an anticipated length of stay of  greater than 2 midnights  Justification for Hospital Stay: Please see detailed plans noted above  Chief Complaint:     Fever and syncope  History of Present Illness:  Rupinder Ndiaye is a 80 y o  male who has a past medical history significant for debility and ambulatory dysfunction with a history of hypertension, hyperlipidemia, gastroesophageal reflux disease nonspecified, with previous history of orthostasis, syncope and chronic kidney disease and sometimes acute renal failure  Patient was sent here from [facility] because he was noted to have a syncopal episode as he was helping echo resident  Reportedly he was trying to hold echo resident and suddenly fell as if he went limp  Here in the emergency room he is fully awake and communicative  Found to be mildly febrile at 100  Patient denies any cough  He denies shortness of breath  He actually wants to be left alone because he is currently sleeping  But he denies any abdominal pain or flank pain  He does not say whether he has dysuria  Patient was noted to have a white count of 20 4 and the lactic acid is 1 8  Blood cultures were taken and currently is on ceftriaxone  Review of Systems:  Review of systems very limited as the patient does not want to speak to examiner    Constitutional:  Denies fever or chills   Eyes:  Denies change in visual acuity   HENT:  Denies nasal congestion or sore throat   Respiratory:  Denies cough or shortness of breath   Cardiovascular:  Denies chest pain or edema   GI:  Denies abdominal pain, nausea, vomiting, bloody stools or diarrhea   :  Denies dysuria   Musculoskeletal:  Denies back pain or joint pain   Integument:  Denies rash   Neurologic:  Denies headache, focal weakness or sensory changes   Endocrine:  Denies polyuria or polydipsia   Lymphatic:  Denies swollen glands   Psychiatric:  Denies depression or anxiety Past Medical and Surgical History:   Past Medical History:   Diagnosis Date    [facility]2017    Arthritis     Cataracts, both eyes     HCAP (healthcare-associated pneumonia) 10/24/2019    Hyperlipidemia     Hypertension     Problems with hearing     [facility] 6/20/2017    Stroke ([facility] )     Syncope 10/23/2019     Past Surgical History:   Procedure Laterality Date    CAROTID ARTERY ANGIOPLASTY      CATARACT EXTRACTION         Meds/Allergies:  (Not in a hospital admission)    B COMPLEX-C-FOLIC ACID PO Take 1 tablet by mouth daily Tavo Leonard MD Reordered   Ordered as: b complex-vitamin C-folic acid (NEPHROCAPS) capsule 1 capsule - 1 capsule, Oral, Daily with dinner, First dose on Mon 1/11/21 at 1630   amLODIPine (NORVASC) 10 mg tablet Take 1 tablet (10 mg total) by mouth daily Tavo Leonard MD Reordered   Ordered as: amLODIPine (NORVASC) tablet 10 mg - 10 mg, Oral, Daily, First dose on Mon 1/11/21 at [facility] for systolic blood pressure less than (mmHg): 110   aspirin (ECOTRIN LOW STRENGTH) 81 mg EC tablet Take 81 mg by mouth daily Tavo Leonard MD Reordered   Ordered as: aspirin (ECOTRIN LOW STRENGTH) EC tablet 81 mg - 81 mg, Oral, Daily, First dose on Mon 1/11/21 at 0900 Do Not Crush - Enteric coated      Cholecalciferol (VITAMIN D3) 1000 units CAPS Take 5,000 Units by mouth Tavo Leonard MD Reordered   Ordered as: cholecalciferol (VITAMIN D3) tablet 1,000 Units - 1,000 Units, Oral, Daily, First dose on Mon 1/11/21 at 0900 25 mcg = 1000 units of cholecalciferol (Vitamin D3)    fluticasone (FLONASE) 50 mcg/act nasal spray  Tavo Leonard MD Reordered   Ordered as: fluticasone (FLONASE) 50 mcg/act nasal spray 1 spray - 1 spray, Each Nare, Daily, First dose on Mon 1/11/21 at 0900 [facility]    hydrALAZINE (APRESOLINE) 10 mg tablet  Tavo Leonard MD Reordered   Ordered as: hydrALAZINE (APRESOLINE) tablet 10 mg - 10 mg, Oral, Daily, First dose on Mon 1/11/21 at 0900 LOOK [facility] Hold for systolic blood pressure less than (mmHg): 110   lisinopril (ZESTRIL) 5 mg tablet Take 2 tablets (10 mg total) by mouth daily Roscoe Watt MD Reordered   Ordered as: lisinopril (ZESTRIL) tablet 10 mg - 10 mg, Oral, Daily, First dose on Mon 1/11/21 at 0900 L[facility] Hold for systolic blood pressure less than (mmHg): 110   multivitamin-minerals (CENTRUM ADULTS) tablet Take 1 tablet by mouth daily Roscoe Watt MD Replaced   Ordered as: multivitamin-minerals (CENTRUM) tablet 1 tablet - 1 tablet, Oral, Daily, First dose on Mon 1/11/21 at 0900 **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**    Omega-3 Fatty Acids (FISH OIL) 1200 MG CAPS Take 1 capsule by mouth daily Roscoe Watt MD Reordered   Ordered as: fish oil capsule 1,000 mg - 1,000 mg (rounded from 1,200 mg), Oral, Daily, First dose on Mon 1/11/21 at 0900 Swallow whole; do not break, crush, dissolve, or chew  omeprazole (PriLOSEC) 20 mg delayed release capsule Take 1 capsule (20 mg total) by mouth daily Roscoe Watt MD Reordered   Ordered as: pantoprazole (PROTONIX) EC tablet 20 mg - 20 mg, Oral, Daily (early morning), First dose on Mon 1/11/21 at 0600 Swallow whole; do not crush, chew or split  [facility]    pravastatin (PRAVACHOL) 40 mg tablet Take 40 mg by mouth daily ALTERNATES 20MG AND 40MG EVERY OTHER DAY Roscoe Watt MD Reordered   Ordered as: pravastatin (PRAVACHOL) tablet 40 mg - 40 mg, Oral, Daily, First dose on Mon 1/11/21 at 0900       Allergies: No Known Allergies  History:  Marital Status:    Occupation:  Patient would not say  Patient Pre-hospital Living Situation:  Lives in [facility]  Patient Pre-hospital Level of Mobility:  Ambulatory  Patient Pre-hospital Diet Restrictions:  Currently regular diet with addition of boost at night    Substance Use History:   Social History     Substance and Sexual Activity   Alcohol Use Never    Frequency: Never    Binge frequency: Never     Social History     Tobacco Use   Smoking Status Never Smoker   Smokeless Tobacco Never Used     Social History     Substance and Sexual Activity   Drug Use No       Family History:  Family History   Problem Relation Age of Onset    Hypertension Mother     Hypertension Daughter        Physical Exam:     Vitals:   Blood Pressure: 138/75 (01/10/21 2100)  Pulse: 98 (01/10/21 2100)  Temperature: 100 1 °F (37 8 °C) (01/10/21 2014)  Temp Source: Rectal (01/10/21 2014)  Respirations: 14 (01/10/21 2100)  Weight - Scale: 64 9 kg (143 lb) (01/10/21 1956)  SpO2: 94 % (01/10/21 2100)    Constitutional:  Well developed, well nourished, no acute distress, non-toxic appearance but elderly and with debility  Eyes:  PERRL, conjunctiva normal   HENT:  Atraumatic, external ears normal, nose normal, oropharynx dry, no pharyngeal exudates  Neck- normal range of motion, no tenderness, supple   Respiratory:  No respiratory distress, normal breath sounds, no rales, no wheezing   Cardiovascular:  Normal rate, normal rhythm, no murmurs, no gallops, no rubs   GI:  Soft, nondistended, normal bowel sounds, nontender, no organomegaly, no mass, no rebound, no guarding and patient denies any abdominal discomfort  :  No costovertebral angle tenderness   Musculoskeletal:  No edema, no tenderness, no deformities  Back- no tenderness  Integument:  Well hydrated, no rash   Lymphatic:  No lymphadenopathy noted   Neurologic:  Alert &awake, communicative, CN 2-12 normal, normal motor function, normal sensory function, no focal deficits noted   Psychiatric:  Speech and behavior appropriate for age and mood      Lab Results: I have personally reviewed pertinent reports        Results from last 7 days   Lab Units 01/10/21  2017   WBC Thousand/uL 20 04*   HEMOGLOBIN g/dL 9 1*   HEMATOCRIT % 27 8*   PLATELETS Thousands/uL 284   LYMPHO PCT % 4*   MONO PCT % 4   EOS PCT % 0 Results from last 7 days   Lab Units 01/10/21  2017   POTASSIUM mmol/L 4 8   CHLORIDE mmol/L 98*   CO2 mmol/L 23   BUN mg/dL 30*   CREATININE mg/dL 2 13*   CALCIUM mg/dL 8 7   ALK PHOS U/L 169*   ALT U/L 28   AST U/L 42     Results from last 7 days   Lab Units 01/10/21  2121   INR  1 11       Imaging: Personally reviewed chest x-ray does not show any suspicious infiltrate  Although similar to previous, it is improved  Joanna Patel Chest 1 View Portable    Result Date: 12/22/2020  Narrative: CHEST INDICATION:   Hypoxia  Patient has suspected COVID-19  COMPARISON:  10/23/2019 EXAM PERFORMED/VIEWS:  XR CHEST PORTABLE FINDINGS: Cardiomediastinal silhouette appears unremarkable  Hazy airspace opacities overlying the left mid lung field  Reticular opacities at the right middle and lower lung consistent with atelectasis versus infiltrate  No pneumothorax or pleural effusion  Osseous structures appear within normal limits for patient age  Impression: Hazy airspace opacities overlying the left mid lung field Reticular opacities at the right middle and lower lung consistent with atelectasis versus infiltrate  Suspect infectious etiology*  * In the setting of clinically suspected COVID-19, the above lung parenchymal findings indicate moderateconfidence level for COVID-19  Workstation performed: [id number]     Ct Head Wo Contrast    Result Date: 12/22/2020  Narrative: CT BRAIN - WITHOUT CONTRAST INDICATION:   Altered mental status Fall at nursing home, with encephalopathy  COMPARISON:  November 6, 2020 TECHNIQUE:  CT examination of the brain was performed  In addition to axial images, sagittal and coronal 2D reformatted images were created and submitted for interpretation  Radiation dose length product (DLP) for this visit:  868 94 mGy-cm     This examination, like all CT scans performed in the [facility], was performed utilizing techniques to minimize radiation dose exposure, including the use of iterative  reconstruction and automated exposure control  IMAGE QUALITY:  Diagnostic  FINDINGS: PARENCHYMA: Decreased attenuation is noted in periventricular and subcortical white matter demonstrating an appearance that is statistically most likely to represent mild microangiopathic change; this appearance is similar when compared to most recent prior examination  Stable appearance of chronic right temporal occipital CVA  No CT signs of acute infarction  No intracranial mass, mass effect or midline shift  No acute parenchymal hemorrhage  VENTRICLES AND EXTRA-AXIAL SPACES:  Intervals are symmetric in size, top normal size for degree of white matter disease and sulcation  Stable compared to prior  VISUALIZED ORBITS AND PARANASAL SINUSES:  Unremarkable  CALVARIUM AND EXTRACRANIAL SOFT TISSUES:  Minimal left frontal scalp swelling  No fracture        Impression: No acute intracranial abnormality  Minimal left frontal scalp swelling  No fracture  Microangiopathic changes  Stable right temporal occipital chronic CVA  Workstation performed: [id number]         ** Please Note: Dragon 360 Dictation voice to text software was used in the creation of this document   **

## 2021-01-11 NOTE — ED NOTES
Per Medardo Willson, patient not being treated for COVID at this time       Kian Zuniga, SAMMY  01/11/21 4389

## 2021-01-11 NOTE — ASSESSMENT & PLAN NOTE
· Patient with mild fever here in emergency room  Documented T-max of 100 1°  · WBC is 20  · Procalcitonin is up trending from 0 28-0 76  · Chest x-ray obtained and reviewed-shows a right upper lung field opacification with increased right-sided interstitial markings in the mid to lower lung field  Left lung field appears clear and much improved compared to prior chest x-ray  · He is saturating 94% on room air however he is tachycardic  · UA obtained in shows granular casts, bacteria, with 2-4 wbc's  · Of note he was tested positive for COVID on December 16 and was treated with vitamins, 10 days of Decadron, completed remdesivir, and 7 days of ceftriaxone and discharged on December 28   He was weaned to room air on December 25th  Plan:  · Continue IV fluids 100 cc/hour  · Continue ceftriaxone  · Recheck CBC to assess WBC  · Will send for urine culture  · Follow-up blood culture

## 2021-01-11 NOTE — ASSESSMENT & PLAN NOTE
· Orthostatsis vs dehydration vs acute moderate hyponatremia  Does have a hx of an occipital stroke although physical exam is nonfocal  May also be related to possible underlying infection     · Already received 1 L normal saline bolus  · TSH normal 1 17  · Last echo on November 8, 2020 with an ejection fraction of 48%, grade 1 diastolic dysfunction, mild aortic stenosis and mild aortic aneurysmal dilation of 4 0 cm  Plan:  · Continue normal saline 100 cc/hours  · Continue ceftriaxone  · Follow-up blood cultures  · Repeat BMP to check sodium level  · Check EKG  · Monitor on telemetry  · Check orthostatic

## 2021-01-11 NOTE — ASSESSMENT & PLAN NOTE
Lab Results   Component Value Date    EGFR 26 01/10/2021    EGFR 32 12/27/2020    EGFR 30 12/26/2020    CREATININE 2 13 (H) 01/10/2021    CREATININE 1 81 (H) 12/27/2020    CREATININE 1 89 (H) 12/26/2020   · Dehydration, versus infection, versus urinary retention  · UA with granular casts in some bacteria  Plan:  · continue IV fluids 100 cc/hour  · Monitor urine output  · Check bladder scan  · Check urine culture  · Hold lisinopril

## 2021-01-11 NOTE — ASSESSMENT & PLAN NOTE
On lisinopril, hydralazine, amlodipine  Plan:  Continue hydralazine and amlodipine  Hold lisinopril in the setting of HANNA

## 2021-01-11 NOTE — ED ATTENDING ATTESTATION
1/10/2021  IIglesia MD, saw and evaluated the patient  I have discussed the patient with the resident/non-physician practitioner and agree with the resident's/non-physician practitioner's findings, Plan of Care, and MDM as documented in the resident's/non-physician practitioner's note, except where noted  All available labs and Radiology studies were reviewed  I was present for key portions of any procedure(s) performed by the resident/non-physician practitioner and I was immediately available to provide assistance  At this point I agree with the current assessment done in the Emergency Department  I have conducted an independent evaluation of this patient a history and physical is as follows:  Pt was walking to bathroom and became limp May have loc  Now acting normal He has no recall of event Pt was noted to be tachy earlier   Pt denies sob or co  PE: alert heart reg lungs clear abd soft nontender MDM: will do labs fluids antibiotics admit  ED Course         Critical Care Time  Procedures

## 2021-01-12 LAB — BACTERIA UR CULT: NORMAL

## 2021-01-12 PROCEDURE — 99232 SBSQ HOSP IP/OBS MODERATE 35: CPT | Performed by: INTERNAL MEDICINE

## 2021-01-12 RX ORDER — ACETAMINOPHEN 325 MG/1
650 TABLET ORAL EVERY 6 HOURS PRN
Status: DISCONTINUED | OUTPATIENT
Start: 2021-01-12 | End: 2021-01-20 | Stop reason: HOSPADM

## 2021-01-12 RX ADMIN — SODIUM CHLORIDE 100 ML/HR: 0.9 INJECTION, SOLUTION INTRAVENOUS at 05:31

## 2021-01-12 RX ADMIN — Medication 1 TABLET: at 08:41

## 2021-01-12 RX ADMIN — HYDRALAZINE HYDROCHLORIDE 10 MG: 10 TABLET, FILM COATED ORAL at 08:42

## 2021-01-12 RX ADMIN — Medication 1 CAPSULE: at 17:03

## 2021-01-12 RX ADMIN — HEPARIN SODIUM 5000 UNITS: 5000 INJECTION INTRAVENOUS; SUBCUTANEOUS at 14:34

## 2021-01-12 RX ADMIN — HEPARIN SODIUM 5000 UNITS: 5000 INJECTION INTRAVENOUS; SUBCUTANEOUS at 05:26

## 2021-01-12 RX ADMIN — PRAVASTATIN SODIUM 40 MG: 40 TABLET ORAL at 17:03

## 2021-01-12 RX ADMIN — OMEGA-3 FATTY ACIDS CAP 1000 MG 1000 MG: 1000 CAP at 08:41

## 2021-01-12 RX ADMIN — SODIUM CHLORIDE 75 ML/HR: 0.9 INJECTION, SOLUTION INTRAVENOUS at 14:35

## 2021-01-12 RX ADMIN — PANTOPRAZOLE SODIUM 20 MG: 20 TABLET, DELAYED RELEASE ORAL at 05:26

## 2021-01-12 RX ADMIN — DOCUSATE SODIUM 100 MG: 100 CAPSULE, LIQUID FILLED ORAL at 08:41

## 2021-01-12 RX ADMIN — CEFTRIAXONE 1000 MG: 2 INJECTION, POWDER, FOR SOLUTION INTRAMUSCULAR; INTRAVENOUS at 21:24

## 2021-01-12 RX ADMIN — DOCUSATE SODIUM 100 MG: 100 CAPSULE, LIQUID FILLED ORAL at 17:03

## 2021-01-12 RX ADMIN — AMLODIPINE BESYLATE 10 MG: 10 TABLET ORAL at 08:41

## 2021-01-12 RX ADMIN — Medication 1000 UNITS: at 08:41

## 2021-01-12 RX ADMIN — ASPIRIN 81 MG: 81 TABLET, COATED ORAL at 08:41

## 2021-01-12 RX ADMIN — HEPARIN SODIUM 5000 UNITS: 5000 INJECTION INTRAVENOUS; SUBCUTANEOUS at 21:24

## 2021-01-12 RX ADMIN — ACETAMINOPHEN 650 MG: 325 TABLET, FILM COATED ORAL at 15:36

## 2021-01-12 RX ADMIN — LISINOPRIL 10 MG: 10 TABLET ORAL at 08:41

## 2021-01-12 NOTE — ASSESSMENT & PLAN NOTE
· History of right occipital stroke  · No focal deficits   · Also has a history of a carotid endarterectomy  Plan:  Continue aspirin and statin

## 2021-01-12 NOTE — ASSESSMENT & PLAN NOTE
· Orthostatsis vs dehydration  Does have a hx of an occipital stroke although physical exam is nonfocal  May also be related to possible underlying infection     · Already received 1 L normal saline bolus  · TSH normal 1 17  · Last echo on November 8, 2020 with an ejection fraction of 47%, grade 1 diastolic dysfunction, mild aortic stenosis and mild aortic aneurysmal dilation of 4 0 cm  Plan:  · Continue normal saline 75 cc/hour  · Continue ceftriaxone  · No events on tele  · Per patient's daughter, patient had a virtual appointment with Dr Cameron Zapata - if no events in the hospital, would recommend maintaining good oral hydration after discharge in follow-up with cardiology outpatient for possible event monitor

## 2021-01-12 NOTE — ASSESSMENT & PLAN NOTE
Lab Results   Component Value Date    EGFR 34 01/11/2021    EGFR 26 01/10/2021    EGFR 32 12/27/2020    CREATININE 1 71 (H) 01/11/2021    CREATININE 2 13 (H) 01/10/2021    CREATININE 1 81 (H) 12/27/2020   ·   · Dehydration, versus infection, versus urinary retention  · UA with granular casts in some bacteria  Plan:  · continue IV fluids  · Renal function improving

## 2021-01-12 NOTE — ASSESSMENT & PLAN NOTE
· Suspected due to hypovolemia  · Improving with IV fluids  · Continue for now and continue to monitor      Lab Results   Component Value Date    SODIUM 130 (L) 01/11/2021    SODIUM 129 (L) 01/10/2021    SODIUM 135 (L) 12/27/2020

## 2021-01-12 NOTE — SEPSIS NOTE
Sepsis Note   Chuy Chaudhary 80 y o  male MRN: [id number]  Unit/Bed#: PPHP 826-01 Encounter: [id number]      qSOFA     Row Name 01/11/21 22:23:35 01/11/21 19:08:06 01/11/21 1644 01/11/21 1400 01/11/21 1302    Altered mental status GCS < 15              Respiratory Rate > / =22  0  0  0  0  0    Systolic BP < / =626  0  0  0  0  0    Q Sofa Score  0  0  0  0  0    Row Name 01/11/21 1100 01/11/21 0700 01/11/21 0500 01/11/21 0308 01/11/21 0306    Altered mental status GCS < 15        1  1    Respiratory Rate > / =22  0  0  0        Systolic BP < / =121  0  0  0        Q Sofa Score  0  1  1  2  2    Row Name 01/11/21 0300 01/11/21 0230 01/11/21 0200 01/10/21 2100 01/10/21 1956    Altered mental status GCS < 15        0      Respiratory Rate > / =22  0  1  0  0  0    Systolic BP < / =846  0  0  0  0  0    Q Sofa Score  0  1  0  0  0        Initial Sepsis Screening     Row Name 01/10/21 2010                Is the patient's history suggestive of a new or worsening infection? (!) Yes (Proceed)  -ST        Suspected source of infection  suspect infection, source unknown  -ST        Are two or more of the following signs & symptoms of infection both present and new to the patient? (!) Yes (Proceed)  -ST        Indicate SIRS criteria  Tachycardia > 90 bpm;Leukocytosis (WBC > 07188 IJL)  -ST        If the answer is yes to both questions, suspicion of sepsis is present          If severe sepsis is present AND tissue hypoperfusion perists in the hour after fluid resuscitation or lactate > 4, the patient meets criteria for SEPTIC SHOCK          Are any of the following organ dysfunction criteria present within 6 hours of suspected infection and SIRS criteria that are NOT considered to be chronic conditions?   No  -ST        Organ dysfunction          Date of presentation of severe sepsis          Time of presentation of severe sepsis          Tissue hypoperfusion persists in the hour after crystalloid fluid administration, evidenced, by either:          Was hypotension present within one hour of the conclusion of crystalloid fluid administration?   No  -ST        Date of presentation of septic shock          Time of presentation of septic shock            User Key  (r) = Recorded By, (t) = Taken By, (c) = Cosigned By    Initials Name Provider Type    Sal Fleming MD Resident               Default Flowsheet Data (last 720 hours)      Sepsis Reassess     Row Name 01/10/21 3962                   Repeat Volume Status and Tissue Perfusion Assessment Performed    Repeat Volume Status and Tissue Perfusion Assessment Performed  Yes  -ST           Volume Status and Tissue Perfusion Post Fluid Resuscitation * Must Document All *    Vital Signs Reviewed (HR, RR, BP, T)  Yes  -ST        Shock Index Reviewed  Yes  -ST        Arterial Oxygen Saturation Reviewed (POx, SaO2 or SpO2)  Yes (comment %) 96  -ST        Cardio  (!) Tachycardia  -ST        Pulmonary  Normal effort  -ST        Capillary Refill  Brisk  -ST        Peripheral Pulses  Dorsalis Pedis  -ST        Dorsalis Pedis  +2  -ST        Skin  Warm;Dry  -ST        Urine output assessed  Adequate  -ST           *OR*   Intensive Monitoring- Must Document One of the Following Four *:    Vital Signs Reviewed          * Central Venous Pressure (CVP or RAP)          * Central Venous Oxygen (SVO2, ScvO2 or Oxygen saturation via central catheter)          * Bedside Cardiovascular US in IVC diameter and % collapse          * Passive Leg Raise OR Crystalloid Challenge            User Key  (r) = Recorded By, (t) = Taken By, (c) = Cosigned By    Initials Name Provider Peggy Meyer MD Resident

## 2021-01-12 NOTE — ASSESSMENT & PLAN NOTE
· Patient with mild fever here in emergency room  Documented T-max of 100 1°  · WBC on presentation was 20  · Procalcitonin is up trending from 0 28-0 76  · Chest x-ray obtained and reviewed-shows a right upper lung field opacification with increased right-sided interstitial markings in the mid to lower lung field  Left lung field appears clear and much improved compared to prior chest x-ray  · UA obtained in shows granular casts, bacteria, with 2-4 wbc's  · Of note he was tested positive for COVID on December 16 and was treated with vitamins, 10 days of Decadron, completed remdesivir, and 7 days of ceftriaxone and discharged on December 28   He was weaned to room air on December 25th  Plan:  · Continue IV fluids   · Continue ceftriaxone  · Follow-up blood cultures

## 2021-01-12 NOTE — PLAN OF CARE
Problem: SAFETY ADULT  Goal: Patient will remain free of falls  Description: INTERVENTIONS:  - Assess patient frequently for physical needs  -  Identify cognitive and physical deficits and behaviors that affect risk of falls    -  [place] fall precautions as indicated by assessment   - Educate patient/family on patient safety including physical limitations  - Instruct patient to call for assistance with activity based on assessment  - Modify environment to reduce risk of injury  - Consider OT/PT consult to assist with strengthening/mobility  Outcome: Progressing  Goal: Maintain or return to baseline ADL function  Description: INTERVENTIONS:  -  Assess patient's ability to carry out ADLs; assess patient's baseline for ADL function and identify physical deficits which impact ability to perform ADLs (bathing, care of mouth/teeth, toileting, grooming, dressing, etc )  - Assess/evaluate cause of self-care deficits   - Assess range of motion  - Assess patient's mobility; develop plan if impaired  - Assess patient's need for assistive devices and provide as appropriate  - Encourage maximum independence but intervene and supervise when necessary  - Involve family in performance of ADLs  - Assess for home care needs following discharge   - Consider OT consult to assist with ADL evaluation and planning for discharge  - Provide patient education as appropriate  Outcome: Progressing  Goal: Maintain or return mobility status to optimal level  Description: INTERVENTIONS:  - Assess patient's baseline mobility status (ambulation, transfers, stairs, etc )    - Identify cognitive and physical deficits and behaviors that affect mobility  - Identify mobility aids required to assist with transfers and/or ambulation (gait belt, sit-to-stand, lift, walker, cane, etc )  - [place] fall precautions as indicated by assessment  - Record patient progress and toleration of activity level on Mobility SBAR; progress patient to next Phase/Stage  - Instruct patient to call for assistance with activity based on assessment  - Consider rehabilitation consult to assist with strengthening/weightbearing, etc   Outcome: Progressing     Problem: Knowledge Deficit  Goal: Patient/family/caregiver demonstrates understanding of disease process, treatment plan, medications, and discharge instructions  Description: Complete learning assessment and assess knowledge base    Interventions:  - Provide teaching at level of understanding  - Provide teaching via preferred learning methods  Outcome: Progressing

## 2021-01-12 NOTE — PROGRESS NOTES
Progress Note Jeb Angel 2/16/1927, 80 y o  male MRN: [id number]    Unit/Bed#: [facility] 826-01 Encounter: [id number]    Primary Care Provider: Claudia Miller MD   Date and time admitted to hospital: 1/10/2021  7:46 PM        * Syncope  Assessment & Plan  · Orthostatsis vs dehydration  Does have a hx of an occipital stroke although physical exam is nonfocal  May also be related to possible underlying infection  · Already received 1 L normal saline bolus  · TSH normal 1 17  · Last echo on November 8, 2020 with an ejection fraction of 98%, grade 1 diastolic dysfunction, mild aortic stenosis and mild aortic aneurysmal dilation of 4 0 cm  Plan:  · Continue normal saline 75 cc/hour  · Continue ceftriaxone  · No events on tele  · Per patient's daughter, patient had a virtual appointment with Dr Denisa Thomas - if no events in the hospital, would recommend maintaining good oral hydration after discharge in follow-up with cardiology outpatient for possible event monitor      Fever in adult  Assessment & Plan  · Patient with mild fever here in emergency room  Documented T-max of 100 1°  · WBC on presentation was 20  · Procalcitonin is up trending from 0 28-0 76  · Chest x-ray obtained and reviewed-shows a right upper lung field opacification with increased right-sided interstitial markings in the mid to lower lung field  Left lung field appears clear and much improved compared to prior chest x-ray  · UA obtained in shows granular casts, bacteria, with 2-4 wbc's  · Of note he was tested positive for COVID on December 16 and was treated with vitamins, 10 days of Decadron, completed remdesivir, and 7 days of ceftriaxone and discharged on December 28  He was weaned to room air on December 25th  Plan:  · Continue IV fluids   · Continue ceftriaxone  · Follow-up blood cultures    Hyponatremia  Assessment & Plan  · Suspected due to hypovolemia  · Improving with IV fluids  · Continue for now and continue to monitor      Lab Results   Component Value Date    SODIUM 130 (L) 2021    SODIUM 129 (L) 01/10/2021    SODIUM 135 (L) 2020         Acute renal failure superimposed on stage 3 chronic kidney disease [facility])  Assessment & Plan  Lab Results   Component Value Date    EGFR 34 2021    EGFR 26 01/10/2021    EGFR 32 2020    CREATININE 1 71 (H) 2021    CREATININE 2 13 (H) 01/10/2021    CREATININE 1 81 (H) 2020   ·   · Dehydration, versus infection, versus urinary retention  · UA with granular casts in some bacteria  Plan:  · continue IV fluids  · Renal function improving    History of stroke  Assessment & Plan  · History of right occipital stroke  · No focal deficits   · Also has a history of a carotid endarterectomy  Plan:  Continue aspirin and statin    Essential hypertension  Assessment & Plan  On lisinopril, hydralazine, amlodipine  Plan:  Continue for now        VTE Pharmacologic Prophylaxis:   Pharmacologic: Heparin  Mechanical VTE Prophylaxis in Place: Yes    Patient Centered Rounds: I have performed bedside rounds with nursing staff today  Discussions with Specialists or Other Care Team Provider:     Education and Discussions with Family / Patient: pt    Time Spent for Care: 30 minutes  More than 50% of total time spent on counseling and coordination of care as described above  Current Length of Stay: 2 day(s)    Current Patient Status: Inpatient   Certification Statement: The patient will continue to require additional inpatient hospital stay due to above    Discharge Plan:     Code Status: Level 3 - DNAR and DNI      Subjective:   Pt seen and examined by me this morning  Pt denied any specific complaints  He said he is feeling cold  Gala Alexandra he took a short off as it was bad but he did not get another shirt      Objective:     Vitals:   Temp (24hrs), Av 6 °F (37 °C), Min:98 5 °F (36 9 °C), Max:98 6 °F (37 °C)    Temp:  [98 5 °F (36 9 °C)-98 6 °F (37 °C)] 98 6 °F (37 °C)  HR:  [] 105  Resp:  [16-20] 18  BP: (128-163)/(67-90) 159/90  SpO2:  [94 %-97 %] 97 %  Body mass index is 21 62 kg/m²  Input and Output Summary (last 24 hours): Intake/Output Summary (Last 24 hours) at 1/12/2021 1349  Last data filed at 1/12/2021 0647  Gross per 24 hour   Intake 1381 66 ml   Output 875 ml   Net 506 66 ml       Physical Exam:     Physical Exam    Constitutional: Pt appears comfortable  Not in any acute distress  Cardiovascular: Normal rate, regular rhythm, normal heart sounds  No murmur heard  Pulmonary/Chest: Effort normal, air entry b/l equal  No respiratory distress  Pt has no wheezes or crackles  Abdominal: Soft  Non-distended, Non-tender  Bowel sounds are normal    Neurological: awake, alert  Moving all extremities spontaneously  Psychiatric: normal mood and affect  Additional Data:     Labs:    Results from last 7 days   Lab Units 01/11/21  1225   WBC Thousand/uL 12 41*   HEMOGLOBIN g/dL 8 3*   HEMATOCRIT % 25 7*   PLATELETS Thousands/uL 254   NEUTROS PCT % 86*   LYMPHS PCT % 6*   MONOS PCT % 6   EOS PCT % 1     Results from last 7 days   Lab Units 01/11/21  1225 01/10/21  2017   SODIUM mmol/L 130* 129*   POTASSIUM mmol/L 3 9 4 8   CHLORIDE mmol/L 101 98*   CO2 mmol/L 22 23   BUN mg/dL 25 30*   CREATININE mg/dL 1 71* 2 13*   ANION GAP mmol/L 7 8   CALCIUM mg/dL 8 7 8 7   ALBUMIN g/dL  --  1 9*   TOTAL BILIRUBIN mg/dL  --  0 57   ALK PHOS U/L  --  169*   ALT U/L  --  28   AST U/L  --  42   GLUCOSE RANDOM mg/dL 113 119     Results from last 7 days   Lab Units 01/10/21  2121   INR  1 11             Results from last 7 days   Lab Units 01/11/21  0454 01/10/21  2121 01/10/21  2017   LACTIC ACID mmol/L  --   --  1 8   PROCALCITONIN ng/ml 0 76* 0 28*  --            * I Have Reviewed All Lab Data Listed Above  * Additional Pertinent Lab Tests Reviewed:  All Labs For Current Hospital Admission Reviewed    Imaging:    Imaging Reports Reviewed Today Include:   Imaging Personally Reviewed by Myself Includes:     Recent Cultures (last 7 days):     Results from last 7 days   Lab Units 01/11/21  1225 01/10/21  2121 01/10/21  2017   BLOOD CULTURE   --  No Growth at 24 hrs  No Growth at 24 hrs  URINE CULTURE  No Growth <1000 cfu/mL  --   --        Last 24 Hours Medication List:   Current Facility-Administered Medications   Medication Dose Route Frequency Provider Last Rate    aluminum-magnesium hydroxide-simethicone  30 mL Oral Q6H PRN Tavo Leonard MD      amLODIPine  10 mg Oral Daily Tavo Leonard MD      aspirin  81 mg Oral Daily Tavo Leonard MD      b complex-vitamin C-folic acid  1 capsule Oral Daily With Nelly Nava MD      cefTRIAXone  1,000 mg Intravenous Q24H Tavo Leonard MD Stopped (01/11/21 2204)    cholecalciferol  1,000 Units Oral Daily Tavo Leonard MD      docusate sodium  100 mg Oral BID Tavo Leonard MD      fish oil  1,000 mg Oral Daily Tavo Leonard MD      fluticasone  1 spray Each Nare Daily Tavo Leonard MD      heparin (porcine)  5,000 Units Subcutaneous [facility] Tavo Leonard MD      hydrALAZINE  10 mg Oral Daily Tavo Leonard MD      lisinopril  10 mg Oral Daily Tavo Leonard MD      multivitamin-minerals  1 tablet Oral Daily Tavo Leonard MD      ondansetron  4 mg Intravenous Q6H PRN Tavo Leonard MD      pantoprazole  20 mg Oral Early Morning Tavo Leonard MD      pravastatin  40 mg Oral Daily With Nelly Nava MD      sodium chloride (PF)  3 mL Intravenous Q1H PRN Tavo Leonard MD      sodium chloride  75 mL/hr Intravenous Continuous Nuzhat Frausto  mL/hr (01/12/21 3587)        Today, Patient Was Seen By: Nuzhat Frausto MD    ** Please Note: Dictation voice to text software may have been used in the creation of this document   **

## 2021-01-12 NOTE — NURSING NOTE
Received to P8 via stretcher from [facility] at 1640  Awake, alert, oriented to self only  Pleasant and cooperative  [place] waist restraint removed  Bed alarm placed on bed

## 2021-01-13 PROBLEM — A41.9 SEPSIS (HCC): Status: ACTIVE | Noted: 2021-01-10

## 2021-01-13 LAB
ANION GAP SERPL CALCULATED.3IONS-SCNC: 7 MMOL/L (ref 4–13)
BUN SERPL-MCNC: 20 MG/DL (ref 5–25)
CALCIUM SERPL-MCNC: 8.4 MG/DL (ref 8.3–10.1)
CHLORIDE SERPL-SCNC: 104 MMOL/L (ref 100–108)
CO2 SERPL-SCNC: 22 MMOL/L (ref 21–32)
CREAT SERPL-MCNC: 1.66 MG/DL (ref 0.6–1.3)
ERYTHROCYTE [DISTWIDTH] IN BLOOD BY AUTOMATED COUNT: 14.4 % (ref 11.6–15.1)
GFR SERPL CREATININE-BSD FRML MDRD: 35 ML/MIN/1.73SQ M
GLUCOSE SERPL-MCNC: 77 MG/DL (ref 65–140)
HCT VFR BLD AUTO: 21.2 % (ref 36.5–49.3)
HCT VFR BLD AUTO: 24.5 % (ref 36.5–49.3)
HEMOCCULT STL QL: NEGATIVE
HEMOCCULT STL QL: NORMAL
HEMOCCULT STL QL: NORMAL
HGB BLD-MCNC: 6.9 G/DL (ref 12–17)
HGB BLD-MCNC: 7.8 G/DL (ref 12–17)
MCH RBC QN AUTO: 30.4 PG (ref 26.8–34.3)
MCHC RBC AUTO-ENTMCNC: 32.5 G/DL (ref 31.4–37.4)
MCV RBC AUTO: 93 FL (ref 82–98)
MRSA NOSE QL CULT: NORMAL
PLATELET # BLD AUTO: 277 THOUSANDS/UL (ref 149–390)
PMV BLD AUTO: 10.3 FL (ref 8.9–12.7)
POTASSIUM SERPL-SCNC: 3.8 MMOL/L (ref 3.5–5.3)
RBC # BLD AUTO: 2.27 MILLION/UL (ref 3.88–5.62)
SODIUM SERPL-SCNC: 133 MMOL/L (ref 136–145)
WBC # BLD AUTO: 13.04 THOUSAND/UL (ref 4.31–10.16)

## 2021-01-13 PROCEDURE — 85014 HEMATOCRIT: CPT | Performed by: PHYSICIAN ASSISTANT

## 2021-01-13 PROCEDURE — 99232 SBSQ HOSP IP/OBS MODERATE 35: CPT | Performed by: PHYSICIAN ASSISTANT

## 2021-01-13 PROCEDURE — 82272 OCCULT BLD FECES 1-3 TESTS: CPT | Performed by: PHYSICIAN ASSISTANT

## 2021-01-13 PROCEDURE — 85027 COMPLETE CBC AUTOMATED: CPT | Performed by: INTERNAL MEDICINE

## 2021-01-13 PROCEDURE — 80048 BASIC METABOLIC PNL TOTAL CA: CPT | Performed by: INTERNAL MEDICINE

## 2021-01-13 PROCEDURE — 85018 HEMOGLOBIN: CPT | Performed by: PHYSICIAN ASSISTANT

## 2021-01-13 RX ORDER — ECHINACEA PURPUREA EXTRACT 125 MG
1 TABLET ORAL
Status: DISCONTINUED | OUTPATIENT
Start: 2021-01-13 | End: 2021-01-14

## 2021-01-13 RX ADMIN — CEFTRIAXONE 1000 MG: 2 INJECTION, POWDER, FOR SOLUTION INTRAMUSCULAR; INTRAVENOUS at 22:10

## 2021-01-13 RX ADMIN — SODIUM CHLORIDE 75 ML/HR: 0.9 INJECTION, SOLUTION INTRAVENOUS at 01:01

## 2021-01-13 RX ADMIN — DOCUSATE SODIUM 100 MG: 100 CAPSULE, LIQUID FILLED ORAL at 09:38

## 2021-01-13 RX ADMIN — Medication 1 SPRAY: at 23:32

## 2021-01-13 RX ADMIN — Medication 1000 UNITS: at 09:38

## 2021-01-13 RX ADMIN — PRAVASTATIN SODIUM 40 MG: 40 TABLET ORAL at 17:02

## 2021-01-13 RX ADMIN — ASPIRIN 81 MG: 81 TABLET, COATED ORAL at 09:37

## 2021-01-13 RX ADMIN — PANTOPRAZOLE SODIUM 20 MG: 20 TABLET, DELAYED RELEASE ORAL at 05:58

## 2021-01-13 RX ADMIN — Medication 1 SPRAY: at 22:13

## 2021-01-13 RX ADMIN — AMLODIPINE BESYLATE 10 MG: 10 TABLET ORAL at 09:38

## 2021-01-13 RX ADMIN — DOCUSATE SODIUM 100 MG: 100 CAPSULE, LIQUID FILLED ORAL at 17:02

## 2021-01-13 RX ADMIN — LISINOPRIL 10 MG: 10 TABLET ORAL at 09:38

## 2021-01-13 RX ADMIN — HEPARIN SODIUM 5000 UNITS: 5000 INJECTION INTRAVENOUS; SUBCUTANEOUS at 22:10

## 2021-01-13 RX ADMIN — HEPARIN SODIUM 5000 UNITS: 5000 INJECTION INTRAVENOUS; SUBCUTANEOUS at 13:57

## 2021-01-13 RX ADMIN — HEPARIN SODIUM 5000 UNITS: 5000 INJECTION INTRAVENOUS; SUBCUTANEOUS at 05:58

## 2021-01-13 RX ADMIN — HYDRALAZINE HYDROCHLORIDE 10 MG: 10 TABLET, FILM COATED ORAL at 09:38

## 2021-01-13 RX ADMIN — SODIUM CHLORIDE 75 ML/HR: 0.9 INJECTION, SOLUTION INTRAVENOUS at 10:41

## 2021-01-13 RX ADMIN — Medication 1 TABLET: at 09:38

## 2021-01-13 RX ADMIN — Medication 1 CAPSULE: at 17:02

## 2021-01-13 RX ADMIN — OMEGA-3 FATTY ACIDS CAP 1000 MG 1000 MG: 1000 CAP at 09:38

## 2021-01-13 NOTE — ASSESSMENT & PLAN NOTE
· Sepsis POA- Documented fever 100 1° WBC 20 04  · Source likely pneumonia  · Procalcitonin is up trending from 0 28-->0 76  · Chest x-ray obtained and reviewed-shows a right upper lung field opacification with increased right-sided interstitial markings in the mid to lower lung field  Left lung field appears clear and much improved compared to prior chest x-ray  · UA obtained in shows granular casts, bacteria, with 2-4 wbc's  · Of note he was tested positive for COVID on December 16 and was treated with vitamins, 10 days of Decadron, completed remdesivir, and 7 days of ceftriaxone and discharged on December 28  He was weaned to room air on December 25th   -Continue IV fluids    -Continue ceftriaxone- day # 4    -Follow-up blood cultures from 1/10/21 with no growth at 48 hours   -Obtain a m   Procalcitonin

## 2021-01-13 NOTE — CASE MANAGEMENT
Spoke with kathie Hilton, explained CM program/CM role  LOS 3 DAYS  Not a Bundle  Not a Readmission  Unplanned Readmission Risk is green  [facility] and family would like him to return  States he was in rehab from last admission  PT required assistance with ADL's, ambulates with walker  PCP is Dr Solis Band provided by facility  Denies hx of VNA, MH, or drug/ETOH issues  Pt has a walker    Primary Contact:  Anyi Monet (daughter)- [phone]  POA-daughter-Blanca shares with her sister  Will need transport    CM reviewed d/c planning process including the following: identifying help at home, patient preference for d/c planning needs, Discharge Lounge, [facility] Meds to Bed program, availability of treatment team to discuss questions or concerns patient and/or family may have regarding understanding medications and recognizing signs and symptoms once discharged  CM also encouraged patient to follow up with all recommended appointments after discharge  Patient advised of importance for patient and family to participate in managing patients medical well being  Patient/caregiver received discharge checklist  Content reviewed  Patient/caregiver encouraged to participate in discharge plan of care prior to discharge home

## 2021-01-13 NOTE — ASSESSMENT & PLAN NOTE
· Orthostatsis vs dehydration  Does have a hx of an occipital stroke although physical exam is nonfocal  May also be related to possible underlying infection     · Already received 1 L normal saline bolus  · TSH normal 1 17  · Last echo on November 8, 2020 with an ejection fraction of 65%, grade 1 diastolic dysfunction, mild aortic stenosis and mild aortic aneurysmal dilation of 4 0 cm   -Continue normal saline but decrease rate   -Continue ceftriaxone   -No events on tele- this has been discontinued   -Per patient's daughter, patient had a virtual appointment with Dr Jammie Rankin - if no events in the hospital, would recommend maintaining good oral hydration after discharge in follow-up with cardiology outpatient for possible event monitor

## 2021-01-13 NOTE — ASSESSMENT & PLAN NOTE
Lab Results   Component Value Date    EGFR 35 01/13/2021    EGFR 34 01/11/2021    EGFR 26 01/10/2021    CREATININE 1 66 (H) 01/13/2021    CREATININE 1 71 (H) 01/11/2021    CREATININE 2 13 (H) 01/10/2021   · Dehydration, versus infection, versus urinary retention  · Urine culture with no growth  · Continue IV fluids  · Renal function improving

## 2021-01-13 NOTE — ASSESSMENT & PLAN NOTE
· History of right occipital stroke  · No focal deficits   · Also has a history of a carotid endarterectomy  · Continue aspirin and statin

## 2021-01-13 NOTE — PROGRESS NOTES
Progress Note Minnie Farris 2/16/1927, 80 y o  male MRN: [id number]    Unit/Bed#: [facility] 826-01 Encounter: [id number]    Primary Care Provider: Akhil Griggs MD   Date and time admitted to hospital: 1/10/2021  7:46 PM        * Syncope  Assessment & Plan  · Orthostatsis vs dehydration  Does have a hx of an occipital stroke although physical exam is nonfocal  May also be related to possible underlying infection  · Already received 1 L normal saline bolus  · TSH normal 1 17  · Last echo on November 8, 2020 with an ejection fraction of 85%, grade 1 diastolic dysfunction, mild aortic stenosis and mild aortic aneurysmal dilation of 4 0 cm   -Continue normal saline but decrease rate   -Continue ceftriaxone   -No events on tele- this has been discontinued   -Per patient's daughter, patient had a virtual appointment with Dr Krys Dale - if no events in the hospital, would recommend maintaining good oral hydration after discharge in follow-up with cardiology outpatient for possible event monitor    History of stroke  Assessment & Plan  · History of right occipital stroke  · No focal deficits   · Also has a history of a carotid endarterectomy  · Continue aspirin and statin    Sepsis ([facility] [place] [address] )  Assessment & Plan  · Sepsis POA- Documented fever 100 1° WBC 20 04  · Source likely pneumonia  · Procalcitonin is up trending from 0 28-->0 76  · Chest x-ray obtained and reviewed-shows a right upper lung field opacification with increased right-sided interstitial markings in the mid to lower lung field  Left lung field appears clear and much improved compared to prior chest x-ray  · UA obtained in shows granular casts, bacteria, with 2-4 wbc's  · Of note he was tested positive for COVID on December 16 and was treated with vitamins, 10 days of Decadron, completed remdesivir, and 7 days of ceftriaxone and discharged on December 28   He was weaned to room air on December 25th   -Continue IV fluids    -Continue ceftriaxone- day # 4 -Follow-up blood cultures from 1/10/21 with no growth at 48 hours   -Obtain a m  Procalcitonin    Hyponatremia  Assessment & Plan  Suspected due to hypovolemia  Lab Results   Component Value Date    SODIUM 133 (L) 01/13/2021    SODIUM 130 (L) 01/11/2021    SODIUM 129 (L) 01/10/2021   Sodium improving with IV fluids    Acute renal failure superimposed on stage 3 chronic kidney disease [facility])  Assessment & Plan  Lab Results   Component Value Date    EGFR 35 01/13/2021    EGFR 34 01/11/2021    EGFR 26 01/10/2021    CREATININE 1 66 (H) 01/13/2021    CREATININE 1 71 (H) 01/11/2021    CREATININE 2 13 (H) 01/10/2021   · Dehydration, versus infection, versus urinary retention  · Urine culture with no growth  · Continue IV fluids  · Renal function improving    GERD (gastroesophageal reflux disease)  Assessment & Plan  Continue omeprazole  HLD (hyperlipidemia)  Assessment & Plan  Currently on Pravachol 40 mg daily  Essential hypertension  Assessment & Plan  On lisinopril 10 mg daily, hydralazine 10 mg daily, amlodipine 10 mg daily      VTE Pharmacologic Prophylaxis:   Pharmacologic: Heparin  Mechanical VTE Prophylaxis in Place: Yes    Discharge Plan: With need for continued inpatient hospitalization for IV antibiotics and IV fluids  Hopeful discharge to rehab tomorrow- awaiting placement  Discussions with Specialists or Other Care Team Provider:  Nursing    Education and Discussions with Family / Patient:  Patient, daughter Reesa Paling via telephone - updated    Time Spent for Care: 20 minutes  More than 50% of total time spent on counseling and coordination of care as described above  Current Length of Stay: 3 day(s)  Current Patient Status: Inpatient   Code Status: Level 3 - DNAR and DNI    Subjective:   Pt resting in bed  Grumpy today  Coughing on exam   Eating without difficulties  Per daughter pt was in [facility] independent Living    Plans are to return to facility but with assisted living and short-term rehab  Objective:     Vitals:   Temp (24hrs), Av °F (37 2 °C), Min:98 2 °F (36 8 °C), Max:100 6 °F (38 1 °C)    Temp:  [98 2 °F (36 8 °C)-100 6 °F (38 1 °C)] 98 7 °F (37 1 °C)  HR:  [] 97  Resp:  [16-22] 22  BP: (109-148)/(59-94) 148/66  SpO2:  [88 %-96 %] 94 %  Body mass index is 20 92 kg/m²  Input and Output Summary (last 24 hours): Intake/Output Summary (Last 24 hours) at 2021 1630  Last data filed at 2021 1503  Gross per 24 hour   Intake 2043 75 ml   Output 1775 ml   Net 268 75 ml       Physical Exam:     Physical Exam  Vitals signs and nursing note reviewed  Constitutional:       General: He is not in acute distress  Appearance: Normal appearance  He is normal weight  He is not ill-appearing, toxic-appearing or diaphoretic  HENT:      Head: Atraumatic  Cardiovascular:      Rate and Rhythm: Normal rate and regular rhythm  Pulmonary:      Effort: Pulmonary effort is normal       Breath sounds: Rhonchi present  Abdominal:      General: Bowel sounds are normal  There is no distension  Palpations: Abdomen is soft  There is no mass  Tenderness: There is no abdominal tenderness  Hernia: No hernia is present  Skin:     General: Skin is warm and dry  Neurological:      Mental Status: He is oriented to person, place, and time  Mental status is at baseline     Psychiatric:         Behavior: Behavior normal          Additional Data:     Labs:    Results from last 7 days   Lab Units 21  0935 21  0522 21  1225   WBC Thousand/uL  --  13 04* 12 41*   HEMOGLOBIN g/dL 7 8* 6 9* 8 3*   HEMATOCRIT % 24 5* 21 2* 25 7*   PLATELETS Thousands/uL  --  277 254   NEUTROS PCT %  --   --  86*   LYMPHS PCT %  --   --  6*   MONOS PCT %  --   --  6   EOS PCT %  --   --  1     Results from last 7 days   Lab Units 21  0522  01/10/21  2017   POTASSIUM mmol/L 3 8   < > 4 8   CHLORIDE mmol/L 104   < > 98*   CO2 mmol/L 22   < > 23   BUN mg/dL 20   < > 30*   CREATININE mg/dL 1 66*   < > 2 13*   CALCIUM mg/dL 8 4   < > 8 7   ALK PHOS U/L  --   --  169*   ALT U/L  --   --  28   AST U/L  --   --  42    < > = values in this interval not displayed  Results from last 7 days   Lab Units 01/10/21  2121   INR  1 11       * I Have Reviewed All Lab Data Listed Above  * Additional Pertinent Lab Tests Reviewed: [address] Admission Reviewed    Imaging:    Imaging Reports Reviewed Today Include:   Imaging Personally Reviewed by Myself Includes:      Recent Cultures (last 7 days):     Results from last 7 days   Lab Units 01/11/21  1225 01/10/21  2121 01/10/21  2017   BLOOD CULTURE   --  No Growth at 48 hrs  No Growth at 48 hrs     URINE CULTURE  No Growth <1000 cfu/mL  --   --        Last 24 Hours Medication List:   Current Facility-Administered Medications   Medication Dose Route Frequency Provider Last Rate    acetaminophen  650 mg Oral Q6H PRN Dara Loera MD      aluminum-magnesium hydroxide-simethicone  30 mL Oral Q6H PRN Chanluke Blood MD      amLODIPine  10 mg Oral Daily Chan MD Caitie      aspirin  81 mg Oral Daily Chan MD Caitie      b complex-vitamin C-folic acid  1 capsule Oral Daily With Terry Kwong MD      cefTRIAXone  1,000 mg Intravenous Q24H Chanluke Blood MD 1,000 mg (01/12/21 2124)    cholecalciferol  1,000 Units Oral Daily Chanluke Blood MD      docusate sodium  100 mg Oral BID Chanluke Blood MD      fish oil  1,000 mg Oral Daily Chan MD Caitie      fluticasone  1 spray Each Nare Daily Chanluke Blood MD      heparin (porcine)  5,000 Units Subcutaneous [facility] Chan Blood MD      hydrALAZINE  10 mg Oral Daily Chan MD Caitie      lisinopril  10 mg Oral Daily Chan MD Caitie      multivitamin-minerals  1 tablet Oral Daily Chan MD Caitie      ondansetron  4 mg Intravenous Q6H PRN Chan MD Caitie      pantoprazole  20 mg Oral Early Morning Chanluke Blood MD  pravastatin  40 mg Oral Daily With Filomena Gallo MD      sodium chloride (PF)  3 mL Intravenous Q1H PRN Linda Knight MD      sodium chloride  50 mL/hr Intravenous Continuous Helen Moreno PA-C 75 mL/hr (01/13/21 1041)        Today, Patient Was Seen By: Helen Moreno PA-C    ** Please Note: This note has been constructed using a voice recognition system   **

## 2021-01-13 NOTE — PLAN OF CARE
Problem: SAFETY ADULT  Goal: Patient will remain free of falls  Description: INTERVENTIONS:  - Assess patient frequently for physical needs  -  Identify cognitive and physical deficits and behaviors that affect risk of falls    -  [place] fall precautions as indicated by assessment   - Educate patient/family on patient safety including physical limitations  - Instruct patient to call for assistance with activity based on assessment  - Modify environment to reduce risk of injury  - Consider OT/PT consult to assist with strengthening/mobility  Outcome: Progressing  Goal: Maintain or return to baseline ADL function  Description: INTERVENTIONS:  -  Assess patient's ability to carry out ADLs; assess patient's baseline for ADL function and identify physical deficits which impact ability to perform ADLs (bathing, care of mouth/teeth, toileting, grooming, dressing, etc )  - Assess/evaluate cause of self-care deficits   - Assess range of motion  - Assess patient's mobility; develop plan if impaired  - Assess patient's need for assistive devices and provide as appropriate  - Encourage maximum independence but intervene and supervise when necessary  - Involve family in performance of ADLs  - Assess for home care needs following discharge   - Consider OT consult to assist with ADL evaluation and planning for discharge  - Provide patient education as appropriate  Outcome: Progressing  Goal: Maintain or return mobility status to optimal level  Description: INTERVENTIONS:  - Assess patient's baseline mobility status (ambulation, transfers, stairs, etc )    - Identify cognitive and physical deficits and behaviors that affect mobility  - Identify mobility aids required to assist with transfers and/or ambulation (gait belt, sit-to-stand, lift, walker, cane, etc )  - [place] fall precautions as indicated by assessment  - Record patient progress and toleration of activity level on Mobility SBAR; progress patient to next Phase/Stage  - Instruct patient to call for assistance with activity based on assessment  - Consider rehabilitation consult to assist with strengthening/weightbearing, etc   Outcome: Progressing     Problem: Knowledge Deficit  Goal: Patient/family/caregiver demonstrates understanding of disease process, treatment plan, medications, and discharge instructions  Description: Complete learning assessment and assess knowledge base  Interventions:  - Provide teaching at level of understanding  - Provide teaching via preferred learning methods  Outcome: Progressing     Problem: Potential for Falls  Goal: Patient will remain free of falls  Description: INTERVENTIONS:  - Assess patient frequently for physical needs  -  Identify cognitive and physical deficits and behaviors that affect risk of falls    -  [place] fall precautions as indicated by assessment   - Educate patient/family on patient safety including physical limitations  - Instruct patient to call for assistance with activity based on assessment  - Modify environment to reduce risk of injury  - Consider OT/PT consult to assist with strengthening/mobility  Outcome: Progressing     Problem: Prexisting or High Potential for Compromised Skin Integrity  Goal: Skin integrity is maintained or improved  Description: INTERVENTIONS:  - Identify patients at risk for skin breakdown  - Assess and monitor skin integrity  - Assess and monitor nutrition and hydration status  - Monitor labs   - Assess for incontinence   - Turn and reposition patient  - Assist with mobility/ambulation  - Relieve pressure over bony prominences  - Avoid friction and shearing  - Provide appropriate hygiene as needed including keeping skin clean and dry  - Evaluate need for skin moisturizer/barrier cream  - Collaborate with interdisciplinary team   - Patient/family teaching  - Consider wound care consult   Outcome: Progressing     Problem: Nutrition/Hydration-ADULT  Goal: Nutrient/Hydration intake appropriate for improving, restoring or maintaining nutritional needs  Description: Monitor and assess patient's nutrition/hydration status for malnutrition  Collaborate with interdisciplinary team and initiate plan and interventions as ordered  Monitor patient's weight and dietary intake as ordered or per policy  Utilize nutrition screening tool and intervene as necessary  Determine patient's food preferences and provide high-protein, high-caloric foods as appropriate       INTERVENTIONS:  - Monitor oral intake, urinary output, labs, and treatment plans  - Assess nutrition and hydration status and recommend course of action  - Evaluate amount of meals eaten  - Assist patient with eating if necessary   - Allow adequate time for meals  - Recommend/ encourage appropriate diets, oral nutritional supplements, and vitamin/mineral supplements  - Order, calculate, and assess calorie counts as needed  - Recommend, monitor, and adjust tube feedings and TPN/PPN based on assessed needs  - Assess need for intravenous fluids  - Provide specific nutrition/hydration education as appropriate  - Include patient/family/caregiver in decisions related to nutrition  Outcome: Progressing     Problem: RESPIRATORY - ADULT  Goal: Achieves optimal ventilation and oxygenation  Description: INTERVENTIONS:  - Assess for changes in respiratory status  - Assess for changes in mentation and behavior  - Position to facilitate oxygenation and minimize respiratory effort  - Oxygen administered by appropriate delivery if ordered  - Initiate smoking cessation education as indicated  - Encourage broncho-pulmonary hygiene including cough, deep breathe, Incentive Spirometry  - Assess the need for suctioning and aspirate as needed  - Assess and instruct to report SOB or any respiratory difficulty  - Respiratory Therapy support as indicated  Outcome: Progressing     Problem: METABOLIC, FLUID AND ELECTROLYTES - ADULT  Goal: Electrolytes maintained within normal limits  Description: INTERVENTIONS:  - Monitor labs and assess patient for signs and symptoms of electrolyte imbalances  - Administer electrolyte replacement as ordered  - Monitor response to electrolyte replacements, including repeat lab results as appropriate  - Instruct patient on fluid and nutrition as appropriate  Outcome: Progressing  Goal: Fluid balance maintained  Description: INTERVENTIONS:  - Monitor labs   - Monitor I/O and WT  - Instruct patient on fluid and nutrition as appropriate  - Assess for signs & symptoms of volume excess or deficit  Outcome: Progressing     Problem: SKIN/TISSUE INTEGRITY - ADULT  Goal: Skin integrity remains intact  Description: INTERVENTIONS  - Identify patients at risk for skin breakdown  - Assess and monitor skin integrity  - Assess and monitor nutrition and hydration status  - Monitor labs (i e  albumin)  - Assess for incontinence   - Turn and reposition patient  - Assist with mobility/ambulation  - Relieve pressure over bony prominences  - Avoid friction and shearing  - Provide appropriate hygiene as needed including keeping skin clean and dry  - Evaluate need for skin moisturizer/barrier cream  - Collaborate with interdisciplinary team (i e  Nutrition, Rehabilitation, etc )   - Patient/family teaching  Outcome: Progressing     Problem: HEMATOLOGIC - ADULT  Goal: Maintains hematologic stability  Description: INTERVENTIONS  - Assess for signs and symptoms of bleeding or hemorrhage  - Monitor labs  - Administer supportive blood products/factors as ordered and appropriate  Outcome: Progressing

## 2021-01-13 NOTE — ASSESSMENT & PLAN NOTE
Suspected due to hypovolemia    Lab Results   Component Value Date    SODIUM 133 (L) 01/13/2021    SODIUM 130 (L) 01/11/2021    SODIUM 129 (L) 01/10/2021   Sodium improving with IV fluids

## 2021-01-13 NOTE — CASE MANAGEMENT
A post acute care recommendation was made by your care team for STR  Discussed Freedom of Choice with POA  Choice is to return/continue services  Referral entered in [facility] to [facility]

## 2021-01-14 PROBLEM — E87.2 METABOLIC ACIDEMIA: Status: ACTIVE | Noted: 2021-01-14

## 2021-01-14 PROBLEM — D64.9 ANEMIA: Status: ACTIVE | Noted: 2021-01-14

## 2021-01-14 PROBLEM — G93.41 ACUTE METABOLIC ENCEPHALOPATHY: Status: ACTIVE | Noted: 2021-01-14

## 2021-01-14 LAB
ALBUMIN SERPL BCP-MCNC: 1.7 G/DL (ref 3.5–5)
ALP SERPL-CCNC: 114 U/L (ref 46–116)
ALT SERPL W P-5'-P-CCNC: 20 U/L (ref 12–78)
ANION GAP SERPL CALCULATED.3IONS-SCNC: 5 MMOL/L (ref 4–13)
AST SERPL W P-5'-P-CCNC: 22 U/L (ref 5–45)
BILIRUB SERPL-MCNC: 0.19 MG/DL (ref 0.2–1)
BUN SERPL-MCNC: 22 MG/DL (ref 5–25)
CALCIUM ALBUM COR SERPL-MCNC: 10.9 MG/DL (ref 8.3–10.1)
CALCIUM SERPL-MCNC: 9.1 MG/DL (ref 8.3–10.1)
CHLORIDE SERPL-SCNC: 104 MMOL/L (ref 100–108)
CO2 SERPL-SCNC: 24 MMOL/L (ref 21–32)
CREAT SERPL-MCNC: 1.7 MG/DL (ref 0.6–1.3)
ERYTHROCYTE [DISTWIDTH] IN BLOOD BY AUTOMATED COUNT: 14.2 % (ref 11.6–15.1)
GFR SERPL CREATININE-BSD FRML MDRD: 34 ML/MIN/1.73SQ M
GLUCOSE SERPL-MCNC: 84 MG/DL (ref 65–140)
HCT VFR BLD AUTO: 23.1 % (ref 36.5–49.3)
HEMOCCULT STL QL: NEGATIVE
HEMOCCULT STL QL: NORMAL
HEMOCCULT STL QL: NORMAL
HGB BLD-MCNC: 7.3 G/DL (ref 12–17)
MCH RBC QN AUTO: 29.7 PG (ref 26.8–34.3)
MCHC RBC AUTO-ENTMCNC: 31.6 G/DL (ref 31.4–37.4)
MCV RBC AUTO: 94 FL (ref 82–98)
PLATELET # BLD AUTO: 305 THOUSANDS/UL (ref 149–390)
PMV BLD AUTO: 9.9 FL (ref 8.9–12.7)
POTASSIUM SERPL-SCNC: 3.6 MMOL/L (ref 3.5–5.3)
PROCALCITONIN SERPL-MCNC: 1.06 NG/ML
PROT SERPL-MCNC: 6.3 G/DL (ref 6.4–8.2)
RBC # BLD AUTO: 2.46 MILLION/UL (ref 3.88–5.62)
SODIUM SERPL-SCNC: 133 MMOL/L (ref 136–145)
WBC # BLD AUTO: 7.94 THOUSAND/UL (ref 4.31–10.16)

## 2021-01-14 PROCEDURE — 84145 PROCALCITONIN (PCT): CPT | Performed by: PHYSICIAN ASSISTANT

## 2021-01-14 PROCEDURE — 99232 SBSQ HOSP IP/OBS MODERATE 35: CPT | Performed by: PHYSICIAN ASSISTANT

## 2021-01-14 PROCEDURE — 82272 OCCULT BLD FECES 1-3 TESTS: CPT | Performed by: INTERNAL MEDICINE

## 2021-01-14 PROCEDURE — 85027 COMPLETE CBC AUTOMATED: CPT | Performed by: PHYSICIAN ASSISTANT

## 2021-01-14 PROCEDURE — 80053 COMPREHEN METABOLIC PANEL: CPT | Performed by: PHYSICIAN ASSISTANT

## 2021-01-14 RX ORDER — GUAIFENESIN 600 MG
600 TABLET, EXTENDED RELEASE 12 HR ORAL EVERY 12 HOURS SCHEDULED
Status: DISCONTINUED | OUTPATIENT
Start: 2021-01-14 | End: 2021-01-20 | Stop reason: HOSPADM

## 2021-01-14 RX ORDER — ECHINACEA PURPUREA EXTRACT 125 MG
1 TABLET ORAL 2 TIMES DAILY
Status: COMPLETED | OUTPATIENT
Start: 2021-01-14 | End: 2021-01-17

## 2021-01-14 RX ADMIN — HEPARIN SODIUM 5000 UNITS: 5000 INJECTION INTRAVENOUS; SUBCUTANEOUS at 21:48

## 2021-01-14 RX ADMIN — HYDRALAZINE HYDROCHLORIDE 10 MG: 10 TABLET, FILM COATED ORAL at 09:01

## 2021-01-14 RX ADMIN — GUAIFENESIN 600 MG: 600 TABLET, EXTENDED RELEASE ORAL at 21:48

## 2021-01-14 RX ADMIN — Medication 1000 UNITS: at 09:01

## 2021-01-14 RX ADMIN — ASPIRIN 81 MG: 81 TABLET, COATED ORAL at 09:01

## 2021-01-14 RX ADMIN — LISINOPRIL 10 MG: 10 TABLET ORAL at 09:01

## 2021-01-14 RX ADMIN — PANTOPRAZOLE SODIUM 20 MG: 20 TABLET, DELAYED RELEASE ORAL at 05:24

## 2021-01-14 RX ADMIN — HEPARIN SODIUM 5000 UNITS: 5000 INJECTION INTRAVENOUS; SUBCUTANEOUS at 05:24

## 2021-01-14 RX ADMIN — Medication 1 TABLET: at 09:01

## 2021-01-14 RX ADMIN — HEPARIN SODIUM 5000 UNITS: 5000 INJECTION INTRAVENOUS; SUBCUTANEOUS at 13:05

## 2021-01-14 RX ADMIN — Medication 1 SPRAY: at 21:48

## 2021-01-14 RX ADMIN — Medication 1 SPRAY: at 05:24

## 2021-01-14 RX ADMIN — Medication 1 CAPSULE: at 17:22

## 2021-01-14 RX ADMIN — OMEGA-3 FATTY ACIDS CAP 1000 MG 1000 MG: 1000 CAP at 09:00

## 2021-01-14 RX ADMIN — DOCUSATE SODIUM 100 MG: 100 CAPSULE, LIQUID FILLED ORAL at 09:01

## 2021-01-14 RX ADMIN — DOCUSATE SODIUM 100 MG: 100 CAPSULE, LIQUID FILLED ORAL at 17:22

## 2021-01-14 RX ADMIN — CEFTRIAXONE 1000 MG: 2 INJECTION, POWDER, FOR SOLUTION INTRAMUSCULAR; INTRAVENOUS at 21:49

## 2021-01-14 RX ADMIN — PRAVASTATIN SODIUM 40 MG: 40 TABLET ORAL at 17:22

## 2021-01-14 RX ADMIN — AMLODIPINE BESYLATE 10 MG: 10 TABLET ORAL at 09:01

## 2021-01-14 NOTE — ASSESSMENT & PLAN NOTE
Results from last 7 days   Lab Units 01/14/21  0458 01/13/21  0935 01/13/21  0522 01/11/21  1225 01/10/21  2017   HEMOGLOBIN g/dL 7 3* 7 8* 6 9* 8 3* 9 1*   · With history of underlying CKD, suspect acute component dilutional given recent IV fluids  · Fecal occult blood test negative x1  · No bloody vomit or black or bloody bowel movements  · Fluids have been stopped  · Recheck hemoglobin in a m

## 2021-01-14 NOTE — RESTORATIVE TECHNICIAN NOTE
Restorative Specialist Mobility Note       Activity: Chair     Assistive Device: Other (Comment)(HHAx2)

## 2021-01-14 NOTE — ASSESSMENT & PLAN NOTE
Suspected due to hypovolemia    Lab Results   Component Value Date    SODIUM 133 (L) 01/14/2021    SODIUM 133 (L) 01/13/2021    SODIUM 130 (L) 01/11/2021   Sodium improving with IV fluids- now stopped

## 2021-01-14 NOTE — ASSESSMENT & PLAN NOTE
· Orthostatsis vs dehydration  Does have a hx of an occipital stroke although physical exam is nonfocal  May also be related to possible underlying infection     · TSH normal 1 17  · Last echo on November 8, 2020 with an ejection fraction of 80%, grade 1 diastolic dysfunction, mild aortic stenosis and mild aortic aneurysmal dilation of 4 0 cm   -Underwent IV fluid hydration with improvement-fluid stopped 1/14/21   -No events on tele- this has been discontinued   -Per patient's daughter, patient had a virtual appointment with Dr Umang Desai - if no events in the hospital, would recommend maintaining good oral hydration after discharge in follow-up with cardiology outpatient for possible event monitor

## 2021-01-14 NOTE — PROGRESS NOTES
Progress Note Johanna Zimmer 2/16/1927, 80 y o  male MRN: [id number]    Unit/Bed#: [facility] 826-01 Encounter: [id number]    Primary Care Provider: Melisa Farrar MD   Date and time admitted to hospital: 1/10/2021  7:46 PM        * Syncope  Assessment & Plan  · Orthostatsis vs dehydration  Does have a hx of an occipital stroke although physical exam is nonfocal  May also be related to possible underlying infection  · TSH normal 1 17  · Last echo on November 8, 2020 with an ejection fraction of 28%, grade 1 diastolic dysfunction, mild aortic stenosis and mild aortic aneurysmal dilation of 4 0 cm   -Underwent IV fluid hydration with improvement-fluid stopped 1/14/21   -No events on tele- this has been discontinued   -Per patient's daughter, patient had a virtual appointment with Dr Judith Arce - if no events in the hospital, would recommend maintaining good oral hydration after discharge in follow-up with cardiology outpatient for possible event monitor    Sepsis [facility])  Assessment & Plan  · Sepsis POA- Documented fever 100 1° WBC 20 04  · Source likely pneumonia   · CXR- right upper lung field opacification with increased right-sided interstitial markings in the mid to lower lung field  Left lung field appears clear and much improved compared to prior chest x-ray  · Procalcitonin is up trending from 0 28-->0 76-->1 06  · Leukocytosis improving (WBC 20 04--> 13 04--> 7 94)  · Urine culture with no growth  · Of note he was tested positive for COVID on December 16 and was treated with vitamins, 10 days of Decadron, completed remdesivir, and 7 days of ceftriaxone and discharged on December 28  He was weaned to room air on December 25th    -S/p IV fluids with improvement    -Continue ceftriaxone- day #5    -Blood cultures 1/10/21 with no growth x72 hours    -Recheck a m   Procalcitonin    -Complains of nasal congestion/cough - add mucinex and nasal saline    Acute metabolic encephalopathy  Assessment & Plan  POA secondary to sepsis from pneumonia and acute hyponatremia  Currently undergoing treatment with IV antibiotics for pneumonia  Received IV fluids for hyponatremia with improvement  Now resolved    Anemia  Assessment & Plan  Results from last 7 days   Lab Units 01/14/21  0458 01/13/21  0935 01/13/21  0522 01/11/21  1225 01/10/21  2017   HEMOGLOBIN g/dL 7 3* 7 8* 6 9* 8 3* 9 1*   · With history of underlying CKD, suspect acute component dilutional given recent IV fluids  · Fecal occult blood test negative x1  · No bloody vomit or black or bloody bowel movements  · Fluids have been stopped  · Recheck hemoglobin in a m  History of stroke  Assessment & Plan  · History of right occipital stroke  · No focal deficits   · Also has a history of a carotid endarterectomy  · Continue aspirin and statin    Hyponatremia  Assessment & Plan  Suspected due to hypovolemia  Lab Results   Component Value Date    SODIUM 133 (L) 01/14/2021    SODIUM 133 (L) 01/13/2021    SODIUM 130 (L) 01/11/2021   Sodium improving with IV fluids- now stopped    Acute renal failure superimposed on stage 3 chronic kidney disease [facility])  Assessment & Plan  Lab Results   Component Value Date    EGFR 34 01/14/2021    EGFR 35 01/13/2021    EGFR 34 01/11/2021    CREATININE 1 70 (H) 01/14/2021    CREATININE 1 66 (H) 01/13/2021    CREATININE 1 71 (H) 01/11/2021   · Presenting creatinine of 2 13  · Baseline appears to be around 1 6-1 8  · Likely secondary to dehydration, versus infection, versus urinary retention  · Urine culture with no growth  · Fluid stopped 1/14/21    GERD (gastroesophageal reflux disease)  Assessment & Plan  · Continue omeprazole  HLD (hyperlipidemia)  Assessment & Plan  · Currently on Pravachol 40 mg daily      Essential hypertension  Assessment & Plan  · On lisinopril 10 mg daily, hydralazine 10 mg daily, amlodipine 10 mg daily      VTE Pharmacologic Prophylaxis:   Pharmacologic: Heparin  Mechanical VTE Prophylaxis in Place: Yes    Discharge Plan: With need for continued IV antibiotics for pneumonia  Hopeful discharge to [facility] in the next 1-2 days  Likely DC back to [facility] but will need rehab  Previously on assisted living side  Discussions with Specialists or Other Care Team Provider:  Nursing    Education and Discussions with Family / Patient:  Patient, attempted to call daughter twice -busy signal    Time Spent for Care: 20 minutes  More than 50% of total time spent on counseling and coordination of care as described above  Current Length of Stay: 4 day(s)  Current Patient Status: Inpatient   Code Status: Level 3 - DNAR and DNI    Subjective:   Resting in bed  Complains of nasal stuffiness and cough  Feels slightly better than yesterday  Remains on room air    Objective:     Vitals:   Temp (24hrs), Av 3 °F (36 8 °C), Min:97 9 °F (36 6 °C), Max:98 7 °F (37 1 °C)    Temp:  [97 9 °F (36 6 °C)-98 7 °F (37 1 °C)] 97 9 °F (36 6 °C)  HR:  [] 112  Resp:  [20-22] 20  BP: (148-156)/(66-94) 156/94  SpO2:  [94 %-96 %] 95 %  Body mass index is 20 54 kg/m²  Input and Output Summary (last 24 hours): Intake/Output Summary (Last 24 hours) at 2021 1516  Last data filed at 2021 1455  Gross per 24 hour   Intake 2568  33 ml   Output 1575 ml   Net 993 33 ml       Physical Exam:     Physical Exam  Vitals signs and nursing note reviewed  Constitutional:       General: He is not in acute distress  Appearance: Normal appearance  He is normal weight  He is not ill-appearing, toxic-appearing or diaphoretic  HENT:      Head: Normocephalic and atraumatic  Eyes:      General: No scleral icterus  Cardiovascular:      Rate and Rhythm: Normal rate and regular rhythm  Pulmonary:      Breath sounds: Rhonchi present  Comments: Scattered rhonchi  Abdominal:      General: Bowel sounds are normal  There is no distension  Palpations: Abdomen is soft  Musculoskeletal:         General: No swelling     Skin:     General: Skin is warm and dry  Neurological:      Mental Status: He is oriented to person, place, and time  Mental status is at baseline  Psychiatric:         Mood and Affect: Mood normal          Behavior: Behavior normal          Additional Data:     Labs:    Results from last 7 days   Lab Units 01/14/21  0458  01/11/21  1225   WBC Thousand/uL 7 94   < > 12 41*   HEMOGLOBIN g/dL 7 3*   < > 8 3*   HEMATOCRIT % 23 1*   < > 25 7*   PLATELETS Thousands/uL 305   < > 254   NEUTROS PCT %  --   --  86*   LYMPHS PCT %  --   --  6*   MONOS PCT %  --   --  6   EOS PCT %  --   --  1    < > = values in this interval not displayed  Results from last 7 days   Lab Units 01/14/21  0458   POTASSIUM mmol/L 3 6   CHLORIDE mmol/L 104   CO2 mmol/L 24   BUN mg/dL 22   CREATININE mg/dL 1 70*   CALCIUM mg/dL 9 1   ALK PHOS U/L 114   ALT U/L 20   AST U/L 22     Results from last 7 days   Lab Units 01/10/21  2121   INR  1 11       * I Have Reviewed All Lab Data Listed Above  * Additional Pertinent Lab Tests Reviewed: All Labs Within Last 24 Hours Reviewed    Imaging:    Imaging Reports Reviewed Today Include:   Imaging Personally Reviewed by Myself Includes:      Recent Cultures (last 7 days):     Results from last 7 days   Lab Units 01/11/21  1225 01/10/21  2121 01/10/21  2017   BLOOD CULTURE   --  No Growth at 72 hrs  No Growth at 72 hrs     URINE CULTURE  No Growth <1000 cfu/mL  --   --        Last 24 Hours Medication List:   Current Facility-Administered Medications   Medication Dose Route Frequency Provider Last Rate    acetaminophen  650 mg Oral Q6H PRN Esthela Carl MD      aluminum-magnesium hydroxide-simethicone  30 mL Oral Q6H PRN Luis Doll MD      amLODIPine  10 mg Oral Daily Luis Doll MD      aspirin  81 mg Oral Daily Luis Doll MD      b complex-vitamin C-folic acid  1 capsule Oral Daily With Justyn Zimmer MD      cefTRIAXone  1,000 mg Intravenous Q24H Luis Doll MD 1,000 mg (01/13/21 2210)    cholecalciferol  1,000 Units Oral Daily Linda Knight MD      docusate sodium  100 mg Oral BID Linda Knight MD      fish oil  1,000 mg Oral Daily Linda Knight MD      fluticasone  1 spray Each Nare Daily Linda Knight MD      guaiFENesin  600 mg Oral Q12H [facility] Rosalva Lehman PA-C      heparin (porcine)  5,000 Units Subcutaneous [facility] Linda Knight MD      hydrALAZINE  10 mg Oral Daily Linda Knight MD      lisinopril  10 mg Oral Daily Linda Knight MD      multivitamin-minerals  1 tablet Oral Daily Linda Knight MD      ondansetron  4 mg Intravenous Q6H PRN Linda Knight MD      pantoprazole  20 mg Oral Early Morning Linda Knight MD      pravastatin  40 mg Oral Daily With Filomena Gallo MD      sodium chloride  1 spray Each Nare BID Helen Moreno PA-C      sodium chloride (PF)  3 mL Intravenous Q1H PRN Linda Knight MD          Today, Patient Was Seen By: Helen Moreno PA-C    ** Please Note: This note has been constructed using a voice recognition system   **

## 2021-01-14 NOTE — ASSESSMENT & PLAN NOTE
POA secondary to sepsis from pneumonia and acute hyponatremia  Currently undergoing treatment with IV antibiotics for pneumonia  Received IV fluids for hyponatremia with improvement  Now resolved

## 2021-01-14 NOTE — ASSESSMENT & PLAN NOTE
Lab Results   Component Value Date    EGFR 34 01/14/2021    EGFR 35 01/13/2021    EGFR 34 01/11/2021    CREATININE 1 70 (H) 01/14/2021    CREATININE 1 66 (H) 01/13/2021    CREATININE 1 71 (H) 01/11/2021   · Presenting creatinine of 2 13  · Baseline appears to be around 1 6-1 8  · Likely secondary to dehydration, versus infection, versus urinary retention  · Urine culture with no growth  · Fluid stopped 1/14/21

## 2021-01-14 NOTE — ASSESSMENT & PLAN NOTE
· Sepsis POA- Documented fever 100 1° WBC 20 04  · Source likely pneumonia   · CXR- right upper lung field opacification with increased right-sided interstitial markings in the mid to lower lung field  Left lung field appears clear and much improved compared to prior chest x-ray  · Procalcitonin is up trending from 0 28-->0 76-->1 06  · Leukocytosis improving (WBC 20 04--> 13 04--> 7 94)  · Urine culture with no growth  · Of note he was tested positive for COVID on December 16 and was treated with vitamins, 10 days of Decadron, completed remdesivir, and 7 days of ceftriaxone and discharged on December 28  He was weaned to room air on December 25th    -S/p IV fluids with improvement    -Continue ceftriaxone- day #5    -Blood cultures 1/10/21 with no growth x72 hours    -Recheck a m   Procalcitonin    -Complains of nasal congestion/cough - add mucinex and nasal saline

## 2021-01-15 LAB
ANION GAP SERPL CALCULATED.3IONS-SCNC: 7 MMOL/L (ref 4–13)
BUN SERPL-MCNC: 20 MG/DL (ref 5–25)
CALCIUM SERPL-MCNC: 9.4 MG/DL (ref 8.3–10.1)
CHLORIDE SERPL-SCNC: 100 MMOL/L (ref 100–108)
CO2 SERPL-SCNC: 25 MMOL/L (ref 21–32)
CREAT SERPL-MCNC: 1.55 MG/DL (ref 0.6–1.3)
ERYTHROCYTE [DISTWIDTH] IN BLOOD BY AUTOMATED COUNT: 14.3 % (ref 11.6–15.1)
GFR SERPL CREATININE-BSD FRML MDRD: 38 ML/MIN/1.73SQ M
GLUCOSE SERPL-MCNC: 84 MG/DL (ref 65–140)
HCT VFR BLD AUTO: 25.7 % (ref 36.5–49.3)
HGB BLD-MCNC: 8.5 G/DL (ref 12–17)
MCH RBC QN AUTO: 30.5 PG (ref 26.8–34.3)
MCHC RBC AUTO-ENTMCNC: 33.1 G/DL (ref 31.4–37.4)
MCV RBC AUTO: 92 FL (ref 82–98)
PLATELET # BLD AUTO: 409 THOUSANDS/UL (ref 149–390)
PMV BLD AUTO: 9.7 FL (ref 8.9–12.7)
POTASSIUM SERPL-SCNC: 3.7 MMOL/L (ref 3.5–5.3)
PROCALCITONIN SERPL-MCNC: 0.67 NG/ML
RBC # BLD AUTO: 2.79 MILLION/UL (ref 3.88–5.62)
SODIUM SERPL-SCNC: 132 MMOL/L (ref 136–145)
WBC # BLD AUTO: 10.09 THOUSAND/UL (ref 4.31–10.16)

## 2021-01-15 PROCEDURE — 80048 BASIC METABOLIC PNL TOTAL CA: CPT | Performed by: PHYSICIAN ASSISTANT

## 2021-01-15 PROCEDURE — 99232 SBSQ HOSP IP/OBS MODERATE 35: CPT | Performed by: INTERNAL MEDICINE

## 2021-01-15 PROCEDURE — 84145 PROCALCITONIN (PCT): CPT | Performed by: PHYSICIAN ASSISTANT

## 2021-01-15 PROCEDURE — 85027 COMPLETE CBC AUTOMATED: CPT | Performed by: PHYSICIAN ASSISTANT

## 2021-01-15 RX ADMIN — PRAVASTATIN SODIUM 40 MG: 40 TABLET ORAL at 17:01

## 2021-01-15 RX ADMIN — OMEGA-3 FATTY ACIDS CAP 1000 MG 1000 MG: 1000 CAP at 08:41

## 2021-01-15 RX ADMIN — Medication 1 CAPSULE: at 17:01

## 2021-01-15 RX ADMIN — GUAIFENESIN 600 MG: 600 TABLET, EXTENDED RELEASE ORAL at 08:41

## 2021-01-15 RX ADMIN — DOCUSATE SODIUM 100 MG: 100 CAPSULE, LIQUID FILLED ORAL at 08:41

## 2021-01-15 RX ADMIN — PANTOPRAZOLE SODIUM 20 MG: 20 TABLET, DELAYED RELEASE ORAL at 05:41

## 2021-01-15 RX ADMIN — HEPARIN SODIUM 5000 UNITS: 5000 INJECTION INTRAVENOUS; SUBCUTANEOUS at 22:54

## 2021-01-15 RX ADMIN — GUAIFENESIN 600 MG: 600 TABLET, EXTENDED RELEASE ORAL at 22:54

## 2021-01-15 RX ADMIN — Medication 1 TABLET: at 08:41

## 2021-01-15 RX ADMIN — Medication 1 SPRAY: at 22:54

## 2021-01-15 RX ADMIN — Medication 1 SPRAY: at 08:41

## 2021-01-15 RX ADMIN — HEPARIN SODIUM 5000 UNITS: 5000 INJECTION INTRAVENOUS; SUBCUTANEOUS at 13:31

## 2021-01-15 RX ADMIN — HEPARIN SODIUM 5000 UNITS: 5000 INJECTION INTRAVENOUS; SUBCUTANEOUS at 05:41

## 2021-01-15 RX ADMIN — AMLODIPINE BESYLATE 10 MG: 10 TABLET ORAL at 08:42

## 2021-01-15 RX ADMIN — HYDRALAZINE HYDROCHLORIDE 10 MG: 10 TABLET, FILM COATED ORAL at 08:42

## 2021-01-15 RX ADMIN — ASPIRIN 81 MG: 81 TABLET, COATED ORAL at 08:41

## 2021-01-15 RX ADMIN — Medication 1000 UNITS: at 08:41

## 2021-01-15 RX ADMIN — LISINOPRIL 10 MG: 10 TABLET ORAL at 08:42

## 2021-01-15 RX ADMIN — CEFTRIAXONE 1000 MG: 2 INJECTION, POWDER, FOR SOLUTION INTRAMUSCULAR; INTRAVENOUS at 22:54

## 2021-01-15 RX ADMIN — DOCUSATE SODIUM 100 MG: 100 CAPSULE, LIQUID FILLED ORAL at 17:01

## 2021-01-15 NOTE — ASSESSMENT & PLAN NOTE
Results from last 7 days   Lab Units 01/15/21  0521 01/14/21  0458 01/13/21  0935 01/13/21  0522 01/11/21  1225   HEMOGLOBIN g/dL 8 5* 7 3* 7 8* 6 9* 8 3*   ·   · With history of underlying CKD, suspect acute component dilutional given recent IV fluids  · Fecal occult blood test negative x1  · No bloody vomit or black or bloody bowel movements  · Fluids have been stopped    · Hb improving

## 2021-01-15 NOTE — PROGRESS NOTES
Progress Note Edra Hi 2/16/1927, 80 y o  male MRN: [id number]    [facility] En[facility]: [id number]    Primary Care Provider: Morgan Pérez MD   Date and time admitted to hospital: 1/10/2021  7:46 PM        * Syncope  Assessment & Plan  · Orthostatsis vs dehydration  Does have a hx of an occipital stroke although physical exam is nonfocal  May also be related to possible underlying infection  · TSH normal 1 17  · Last echo on November 8, 2020 with an ejection fraction of 97%, grade 1 diastolic dysfunction, mild aortic stenosis and mild aortic aneurysmal dilation of 4 0 cm   -Underwent IV fluid hydration with improvement-fluid stopped 1/14/21   -No events on tele- this has been discontinued   -Per patient's daughter, patient had a virtual appointment with Dr Jayla Engel - if no events in the hospital, would recommend maintaining good oral hydration after discharge in follow-up with cardiology outpatient for possible event monitor  Sepsis ([facility] [address] )  Assessment & Plan  · Sepsis POA- Documented fever 100 1° WBC 20 04  · Source likely pneumonia   · CXR- right upper lung field opacification with increased right-sided interstitial markings in the mid to lower lung field  Left lung field appears clear and much improved compared to prior chest x-ray  · Procalcitonin is up trending from 0 28-->0 76-->1 06  · Leukocytosis improving (WBC 20 04--> 13 04--> 7 94)  · Urine culture with no growth  · Of note he was tested positive for COVID on December 16 and was treated with vitamins, 10 days of Decadron, completed remdesivir, and 7 days of ceftriaxone and discharged on December 28  He was weaned to room air on December 25th    -S/p IV fluids with improvement    -Continue ceftriaxone- day #6    -Blood cultures 1/10/21 with no growth x72 hours    - mucinex and nasal saline    Hyponatremia  Assessment & Plan  Suspected due to hypovolemia    Lab Results   Component Value Date    SODIUM 132 (L) 01/15/2021    SODIUM 133 (L) 01/14/2021    SODIUM 133 (L) 01/13/2021   Sodium improving with IV fluids- now stopped    Acute renal failure superimposed on stage 3 chronic kidney disease [facility])  Assessment & Plan  Lab Results   Component Value Date    EGFR 38 01/15/2021    EGFR 34 01/14/2021    EGFR 35 01/13/2021    CREATININE 1 55 (H) 01/15/2021    CREATININE 1 70 (H) 01/14/2021    CREATININE 1 66 (H) 01/13/2021   · Presenting creatinine of 2 13  · Baseline appears to be around 1 6-1 8  · Likely secondary to dehydration, versus infection, versus urinary retention  · Urine culture with no growth  · Fluid stopped 9/60/23    Acute metabolic encephalopathy  Assessment & Plan  POA secondary to sepsis from pneumonia and acute hyponatremia  Currently undergoing treatment with IV antibiotics for pneumonia  Received IV fluids for hyponatremia with improvement  Now resolved    Anemia  Assessment & Plan  Results from last 7 days   Lab Units 01/15/21  0521 01/14/21  0458 01/13/21  0935 01/13/21  0522 01/11/21  1225   HEMOGLOBIN g/dL 8 5* 7 3* 7 8* 6 9* 8 3*   ·   · With history of underlying CKD, suspect acute component dilutional given recent IV fluids  · Fecal occult blood test negative x1  · No bloody vomit or black or bloody bowel movements  · Fluids have been stopped  · Hb improving    Essential hypertension  Assessment & Plan  · On lisinopril 10 mg daily, hydralazine 10 mg daily, amlodipine 10 mg daily          VTE Pharmacologic Prophylaxis:   Pharmacologic: Heparin  Mechanical VTE Prophylaxis in Place: Yes    Patient Centered Rounds: I have performed bedside rounds with nursing staff today  Discussions with Specialists or Other Care Team Provider:     Education and Discussions with Family / Patient: pt  Tried calling daughter - unable to reach    Time Spent for Care: 15 minutes  More than 50% of total time spent on counseling and coordination of care as described above      Current Length of Stay: 5 day(s)    Current Patient Status: Inpatient   Certification Statement: The patient will continue to require additional inpatient hospital stay due to above    Discharge Plan: in next 24-48 hours    Code Status: Level 3 - DNAR and DNI      Subjective:   Pt seen and examined by me this morning  Pt denied any specific complains but said - he feels he is going to diet  Objective:     Vitals:   Temp (24hrs), Av 5 °F (36 9 °C), Min:98 5 °F (36 9 °C), Max:98 6 °F (37 °C)    Temp:  [98 5 °F (36 9 °C)-98 6 °F (37 °C)] 98 5 °F (36 9 °C)  HR:  [] 107  Resp:  [16-18] 16  BP: ()/(72-84) 169/84  SpO2:  [93 %-96 %] 93 %  Body mass index is 20 98 kg/m²  Input and Output Summary (last 24 hours): Intake/Output Summary (Last 24 hours) at 1/15/2021 1319  Last data filed at 1/15/2021 1201  Gross per 24 hour   Intake 2058  33 ml   Output 1950 ml   Net 108 33 ml       Physical Exam:     Physical Exam    Constitutional: Pt appears comfortable  Not in any acute distress  Cardiovascular: Normal rate, regular rhythm, normal heart sounds  No murmur heard  Pulmonary/Chest: Effort normal, air entry b/l equal  No respiratory distress  Pt has no wheezes or crackles  Abdominal: Soft  Non-distended, Non-tender  Bowel sounds are normal    Neurological: awake, alert  Moving all extremities spontaneously  Psychiatric: pleasantly confused  Additional Data:     Labs:    Results from last 7 days   Lab Units 01/15/21  0521  21  1225   WBC Thousand/uL 10 09   < > 12 41*   HEMOGLOBIN g/dL 8 5*   < > 8 3*   HEMATOCRIT % 25 7*   < > 25 7*   PLATELETS Thousands/uL 409*   < > 254   NEUTROS PCT %  --   --  86*   LYMPHS PCT %  --   --  6*   MONOS PCT %  --   --  6   EOS PCT %  --   --  1    < > = values in this interval not displayed       Results from last 7 days   Lab Units 01/15/21  0521 21  0458   SODIUM mmol/L 132* 133*   POTASSIUM mmol/L 3 7 3 6   CHLORIDE mmol/L 100 104   CO2 mmol/L 25 24   BUN mg/dL 20 22   CREATININE mg/dL 1 55* 1 70* ANION GAP mmol/L 7 5   CALCIUM mg/dL 9 4 9 1   ALBUMIN g/dL  --  1 7*   TOTAL BILIRUBIN mg/dL  --  0 19*   ALK PHOS U/L  --  114   ALT U/L  --  20   AST U/L  --  22   GLUCOSE RANDOM mg/dL 84 84     Results from last 7 days   Lab Units 01/10/21  2121   INR  1 11             Results from last 7 days   Lab Units 01/15/21  0521 01/14/21  0458 01/11/21  0454 01/10/21  2121 01/10/21  2017   LACTIC ACID mmol/L  --   --   --   --  1 8   PROCALCITONIN ng/ml 0 67* 1 06* 0 76* 0 28*  --            * I Have Reviewed All Lab Data Listed Above  * Additional Pertinent Lab Tests Reviewed: L[address] Admission Reviewed    Imaging:    Imaging Reports Reviewed Today Include:   Imaging Personally Reviewed by Myself Includes:      Recent Cultures (last 7 days):     Results from last 7 days   Lab Units 01/11/21  1225 01/10/21  2121 01/10/21  2017   BLOOD CULTURE   --  No Growth After 4 Days  No Growth After 4 Days     URINE CULTURE  No Growth <1000 cfu/mL  --   --        Last 24 Hours Medication List:   Current Facility-Administered Medications   Medication Dose Route Frequency Provider Last Rate    acetaminophen  650 mg Oral Q6H PRN Rhona Moses MD      aluminum-magnesium hydroxide-simethicone  30 mL Oral Q6H PRN Davi Coburn MD      amLODIPine  10 mg Oral Daily Davi Coburn MD      aspirin  81 mg Oral Daily Davi Coburn MD      b complex-vitamin C-folic acid  1 capsule Oral Daily With Batool Trent MD      cefTRIAXone  1,000 mg Intravenous Q24H Davi Coburn MD 1,000 mg (01/14/21 2149)    cholecalciferol  1,000 Units Oral Daily Davi Coburn MD      docusate sodium  100 mg Oral BID Davi Coburn MD      fish oil  1,000 mg Oral Daily Davi Coburn MD      fluticasone  1 spray Each Nare Daily Davi Coburn MD      guaiFENesin  600 mg Oral Q12H [address] Rosalva Lehman PA-C      heparin (porcine)  5,000 Units Subcutaneous [facility] [address] Davi Coburn MD      hydrALAZINE 10 mg Oral Daily Bonnie Gardner MD      lisinopril  10 mg Oral Daily Bonnie Gardner MD      multivitamin-minerals  1 tablet Oral Daily Bonnie Gardner MD      ondansetron  4 mg Intravenous Q6H PRN Bonnie Gardner MD      pantoprazole  20 mg Oral Early Morning Bonnie Gardner MD      pravastatin  40 mg Oral Daily With Chinyere Ferguson MD      sodium chloride  1 spray Each Nare BID Saman Garcia PA-C      sodium chloride (PF)  3 mL Intravenous Q1H PRN Bonnie Gardner MD          Today, Patient Was Seen By: Virginia Portillo MD    ** Please Note: Dictation voice to text software may have been used in the creation of this document   **

## 2021-01-15 NOTE — UTILIZATION REVIEW
Continued Stay Review    Date: 1/15/21                        Current Patient Class: IP Current Level of Care: MS    HPI:89 y o  male initially admitted on 1/10 with Pneumonia, sepsis, acute encephalopathy, ARF on CKD stage 3, previous Covid infection    Assessment/Plan:   Pt remains on IV antibiotics  So far blood cultures and urine culture are negative  Pt had an episode of tachycardia today  No hypoxia in last 48 hrs        Pertinent Labs/Diagnostic Results:   Results from last 7 days   Lab Units 01/11/21  1225   SARS-COV-2  Negative     Results from last 7 days   Lab Units 01/15/21  0521 01/14/21  0458 01/13/21  0935 01/13/21  0522 01/11/21  1225   WBC Thousand/uL 10 09 7 94  --  13 04* 12 41*   HEMOGLOBIN g/dL 8 5* 7 3* 7 8* 6 9* 8 3*   HEMATOCRIT % 25 7* 23 1* 24 5* 21 2* 25 7*   PLATELETS Thousands/uL 409* 305  --  277 254   NEUTROS ABS Thousands/µL  --   --   --   --  10 67*         Results from last 7 days   Lab Units 01/15/21  0521 01/14/21  0458 01/13/21  0522 01/11/21  1225 01/11/21  0450 01/10/21  2017   SODIUM mmol/L 132* 133* 133* 130*  --  129*   POTASSIUM mmol/L 3 7 3 6 3 8 3 9  --  4 8   CHLORIDE mmol/L 100 104 104 101  --  98*   CO2 mmol/L 25 24 22 22  --  23   ANION GAP mmol/L 7 5 7 7  --  8   BUN mg/dL 20 22 20 25  --  30*   CREATININE mg/dL 1 55* 1 70* 1 66* 1 71*  --  2 13*   EGFR ml/min/1 73sq m 38 34 35 34  --  26   CALCIUM mg/dL 9 4 9 1 8 4 8 7  --  8 7   MAGNESIUM mg/dL  --   --   --   --  1 6  --    PHOSPHORUS mg/dL  --   --   --   --  3 7  --      Results from last 7 days   Lab Units 01/14/21  0458 01/10/21  2017   AST U/L 22 42   ALT U/L 20 28   ALK PHOS U/L 114 169*   TOTAL PROTEIN g/dL 6 3* 7 3   ALBUMIN g/dL 1 7* 1 9*   TOTAL BILIRUBIN mg/dL 0 19* 0 57         Results from last 7 days   Lab Units 01/15/21  0521 01/14/21  0458 01/13/21  0522 01/11/21  1225 01/10/21  2017   GLUCOSE RANDOM mg/dL 84 84 77 113 119     Results from last 7 days   Lab Units 01/11/21  1225 01/11/21  1339 01/11/21  0449 01/10/21  2017   TROPONIN I ng/mL <0 02 <0 02 <0 02 <0 02         Results from last 7 days   Lab Units 01/10/21  2121   PROTIME seconds 14 3   INR  1 11   PTT seconds 44*     Results from last 7 days   Lab Units 01/11/21  0450   TSH 3RD GENERATON uIU/mL 1 170     Results from last 7 days   Lab Units 01/15/21  0521 01/14/21  0458 01/11/21  0454 01/10/21  2121   PROCALCITONIN ng/ml 0 67* 1 06* 0 76* 0 28*     Results from last 7 days   Lab Units 01/10/21  2017   LACTIC ACID mmol/L 1 8     Results from last 7 days   Lab Units 01/10/21  2151   CLARITY UA  Cloudy   COLOR UA  Yellow   SPEC GRAV UA  1 018   PH UA  5 5   GLUCOSE UA mg/dl Negative   KETONES UA mg/dl Negative   BLOOD UA  Negative   PROTEIN UA mg/dl 100 (2+)*   NITRITE UA  Negative   BILIRUBIN UA  Negative   UROBILINOGEN UA E U /dl 2 0*   LEUKOCYTES UA  Negative   WBC UA /hpf 2-4   RBC UA /hpf None Seen   BACTERIA UA /hpf Occasional   EPITHELIAL CELLS WET PREP /hpf Moderate*     Results from last 7 days   Lab Units 01/11/21  1225   INFLUENZA A PCR  Negative   INFLUENZA B PCR  Negative   RSV PCR  Negative     Results from last 7 days   Lab Units 01/11/21  1225 01/10/21  2121 01/10/21  2017   BLOOD CULTURE   --  No Growth After 4 Days  No Growth After 4 Days     URINE CULTURE  No Growth <1000 cfu/mL  --   --      Vital Signs:   01/15/21 08:41:59  98 5 °F (36 9 °C)  107Abnormal     169/84  112  93 %     01/15/21 07:50:14    124Abnormal   16  99/72  81  96 %     01/15/21 0730              None (Room air)   01/14/21 22:00:46  98 6 °F (37 °C)  104  16  157/84  108  94 %     01/14/21 2015              None (Room air)   01/14/21 15:33:08  98 5 °F (36 9 °C)  99  18  161/83  109  95 %     01/14/21 0900  97 9 °F (36 6 °C)  112Abnormal   20  156/94  115  95 %  None (Room air)   01/13/21 22:58:09  98 2 °F (36 8 °C)  92  20  150/85  107  96 %     01/13/21 1915              None (Room air)   01/13/21 15:35:37  98 7 °F (37 1 °C)  97  22 148/66  93  94 %     01/13/21 10:55:49    103  16  128/94  105  91 %     01/13/21 09:36:53    99    126/71  89  96 %     01/13/21 0810            91 %     01/13/21 07:25:05  98 4 °F (36 9 °C)  94  16  109/59  76  88 %Abnormal      01/13/21 03:00:29  98 4 °F (36 9 °C)  99  20  128/69  89  90 %       Medications:   Scheduled Medications:  amLODIPine, 10 mg, Oral, Daily  aspirin, 81 mg, Oral, Daily  b complex-vitamin C-folic acid, 1 capsule, Oral, Daily With Dinner  cefTRIAXone, 1,000 mg, Intravenous, Q24H  cholecalciferol, 1,000 Units, Oral, Daily  docusate sodium, 100 mg, Oral, BID  fish oil, 1,000 mg, Oral, Daily  fluticasone, 1 spray, Each Nare, Daily  guaiFENesin, 600 mg, Oral, Q12H SENIA  heparin (porcine), 5,000 Units, Subcutaneous, Q8H S[place]IA  hydrALAZINE, 10 mg, Oral, Daily  lisinopril, 10 mg, Oral, Daily  multivitamin-minerals, 1 tablet, Oral, Daily  pantoprazole, 20 mg, Oral, Early Morning  pravastatin, 40 mg, Oral, Daily With Dinner  sodium chloride, 1 spray, Each Nare, BID      Continuous IV Infusions:   IV fluids d/c 1/14    PRN Meds:  acetaminophen, 650 mg, Oral, Q6H PRN  aluminum-magnesium hydroxide-simethicone, 30 mL, Oral, Q6H PRN  ondansetron, 4 mg, Intravenous, Q6H PRN  [place] nasal spray PRN q 1 hr  - x 1 1/14 and d/c     Discharge Plan: rehab    Network Utilization Review Department  ATTENTION: Please call with any questions or concerns to [phone] and carefully listen to the prompts so that you are directed to the right person  All voicemails are confidential   Filipe Ward all requests for admission clinical reviews, approved or denied determinations and any other requests to dedicated fax number below belonging to the campus where the patient is receiving treatment   List of dedicated fax numbers for the Facilities:  [address] [place] [address] DENIALS (Administrative/Medical Necessity) [phone]   [address] (Maternity/NICU/Pediatrics) [phone]   Chris Chan 553 [facility] [address] [facility] [address] [place] [address] [address] [place] [address] Annetta Melendez [facility] [phone]   [place] [address] [phone]

## 2021-01-15 NOTE — ASSESSMENT & PLAN NOTE
· Orthostatsis vs dehydration  Does have a hx of an occipital stroke although physical exam is nonfocal  May also be related to possible underlying infection  · TSH normal 1 17  · Last echo on November 8, 2020 with an ejection fraction of 50%, grade 1 diastolic dysfunction, mild aortic stenosis and mild aortic aneurysmal dilation of 4 0 cm   -Underwent IV fluid hydration with improvement-fluid stopped 1/14/21   -No events on tele- this has been discontinued   -Per patient's daughter, patient had a virtual appointment with Dr Gerardo Zapata - if no events in the hospital, would recommend maintaining good oral hydration after discharge in follow-up with cardiology outpatient for possible event monitor

## 2021-01-15 NOTE — PLAN OF CARE
Problem: SAFETY ADULT  Goal: Patient will remain free of falls  Description: INTERVENTIONS:  - Assess patient frequently for physical needs  -  Identify cognitive and physical deficits and behaviors that affect risk of falls    -  [place] fall precautions as indicated by assessment   - Educate patient/family on patient safety including physical limitations  - Instruct patient to call for assistance with activity based on assessment  - Modify environment to reduce risk of injury  - Consider OT/PT consult to assist with strengthening/mobility  Outcome: Progressing  Goal: Maintain or return to baseline ADL function  Description: INTERVENTIONS:  -  Assess patient's ability to carry out ADLs; assess patient's baseline for ADL function and identify physical deficits which impact ability to perform ADLs (bathing, care of mouth/teeth, toileting, grooming, dressing, etc )  - Assess/evaluate cause of self-care deficits   - Assess range of motion  - Assess patient's mobility; develop plan if impaired  - Assess patient's need for assistive devices and provide as appropriate  - Encourage maximum independence but intervene and supervise when necessary  - Involve family in performance of ADLs  - Assess for home care needs following discharge   - Consider OT consult to assist with ADL evaluation and planning for discharge  - Provide patient education as appropriate  Outcome: Progressing  Goal: Maintain or return mobility status to optimal level  Description: INTERVENTIONS:  - Assess patient's baseline mobility status (ambulation, transfers, stairs, etc )    - Identify cognitive and physical deficits and behaviors that affect mobility  - Identify mobility aids required to assist with transfers and/or ambulation (gait belt, sit-to-stand, lift, walker, cane, etc )  - [place] fall precautions as indicated by assessment  - Record patient progress and toleration of activity level on Mobility SBAR; progress patient to next Phase/Stage  - Instruct patient to call for assistance with activity based on assessment  - Consider rehabilitation consult to assist with strengthening/weightbearing, etc   Outcome: Progressing     Problem: Knowledge Deficit  Goal: Patient/family/caregiver demonstrates understanding of disease process, treatment plan, medications, and discharge instructions  Description: Complete learning assessment and assess knowledge base  Interventions:  - Provide teaching at level of understanding  - Provide teaching via preferred learning methods  Outcome: Progressing     Problem: Potential for Falls  Goal: Patient will remain free of falls  Description: INTERVENTIONS:  - Assess patient frequently for physical needs  -  Identify cognitive and physical deficits and behaviors that affect risk of falls    -  [place] fall precautions as indicated by assessment   - Educate patient/family on patient safety including physical limitations  - Instruct patient to call for assistance with activity based on assessment  - Modify environment to reduce risk of injury  - Consider OT/PT consult to assist with strengthening/mobility  Outcome: Progressing     Problem: Prexisting or High Potential for Compromised Skin Integrity  Goal: Skin integrity is maintained or improved  Description: INTERVENTIONS:  - Identify patients at risk for skin breakdown  - Assess and monitor skin integrity  - Assess and monitor nutrition and hydration status  - Monitor labs   - Assess for incontinence   - Turn and reposition patient  - Assist with mobility/ambulation  - Relieve pressure over bony prominences  - Avoid friction and shearing  - Provide appropriate hygiene as needed including keeping skin clean and dry  - Evaluate need for skin moisturizer/barrier cream  - Collaborate with interdisciplinary team   - Patient/family teaching  - Consider wound care consult   Outcome: Progressing     Problem: Nutrition/Hydration-ADULT  Goal: Nutrient/Hydration intake appropriate for improving, restoring or maintaining nutritional needs  Description: Monitor and assess patient's nutrition/hydration status for malnutrition  Collaborate with interdisciplinary team and initiate plan and interventions as ordered  Monitor patient's weight and dietary intake as ordered or per policy  Utilize nutrition screening tool and intervene as necessary  Determine patient's food preferences and provide high-protein, high-caloric foods as appropriate       INTERVENTIONS:  - Monitor oral intake, urinary output, labs, and treatment plans  - Assess nutrition and hydration status and recommend course of action  - Evaluate amount of meals eaten  - Assist patient with eating if necessary   - Allow adequate time for meals  - Recommend/ encourage appropriate diets, oral nutritional supplements, and vitamin/mineral supplements  - Order, calculate, and assess calorie counts as needed  - Recommend, monitor, and adjust tube feedings and TPN/PPN based on assessed needs  - Assess need for intravenous fluids  - Provide specific nutrition/hydration education as appropriate  - Include patient/family/caregiver in decisions related to nutrition  Outcome: Progressing     Problem: RESPIRATORY - ADULT  Goal: Achieves optimal ventilation and oxygenation  Description: INTERVENTIONS:  - Assess for changes in respiratory status  - Assess for changes in mentation and behavior  - Position to facilitate oxygenation and minimize respiratory effort  - Oxygen administered by appropriate delivery if ordered  - Initiate smoking cessation education as indicated  - Encourage broncho-pulmonary hygiene including cough, deep breathe, Incentive Spirometry  - Assess the need for suctioning and aspirate as needed  - Assess and instruct to report SOB or any respiratory difficulty  - Respiratory Therapy support as indicated  Outcome: Progressing     Problem: METABOLIC, FLUID AND ELECTROLYTES - ADULT  Goal: Electrolytes maintained within normal limits  Description: INTERVENTIONS:  - Monitor labs and assess patient for signs and symptoms of electrolyte imbalances  - Administer electrolyte replacement as ordered  - Monitor response to electrolyte replacements, including repeat lab results as appropriate  - Instruct patient on fluid and nutrition as appropriate  Outcome: Progressing  Goal: Fluid balance maintained  Description: INTERVENTIONS:  - Monitor labs   - Monitor I/O and WT  - Instruct patient on fluid and nutrition as appropriate  - Assess for signs & symptoms of volume excess or deficit  Outcome: Progressing     Problem: SKIN/TISSUE INTEGRITY - ADULT  Goal: Skin integrity remains intact  Description: INTERVENTIONS  - Identify patients at risk for skin breakdown  - Assess and monitor skin integrity  - Assess and monitor nutrition and hydration status  - Monitor labs (i e  albumin)  - Assess for incontinence   - Turn and reposition patient  - Assist with mobility/ambulation  - Relieve pressure over bony prominences  - Avoid friction and shearing  - Provide appropriate hygiene as needed including keeping skin clean and dry  - Evaluate need for skin moisturizer/barrier cream  - Collaborate with interdisciplinary team (i e  Nutrition, Rehabilitation, etc )   - Patient/family teaching  Outcome: Progressing     Problem: HEMATOLOGIC - ADULT  Goal: Maintains hematologic stability  Description: INTERVENTIONS  - Assess for signs and symptoms of bleeding or hemorrhage  - Monitor labs  - Administer supportive blood products/factors as ordered and appropriate  Outcome: Progressing

## 2021-01-15 NOTE — ASSESSMENT & PLAN NOTE
Lab Results   Component Value Date    EGFR 38 01/15/2021    EGFR 34 01/14/2021    EGFR 35 01/13/2021    CREATININE 1 55 (H) 01/15/2021    CREATININE 1 70 (H) 01/14/2021    CREATININE 1 66 (H) 01/13/2021   · Presenting creatinine of 2 13  · Baseline appears to be around 1 6-1 8  · Likely secondary to dehydration, versus infection, versus urinary retention  · Urine culture with no growth  · Fluid stopped 1/14/21

## 2021-01-15 NOTE — ASSESSMENT & PLAN NOTE
· Sepsis POA- Documented fever 100 1° WBC 20 04  · Source likely pneumonia   · CXR- right upper lung field opacification with increased right-sided interstitial markings in the mid to lower lung field  Left lung field appears clear and much improved compared to prior chest x-ray  · Procalcitonin is up trending from 0 28-->0 76-->1 06  · Leukocytosis improving (WBC 20 04--> 13 04--> 7 94)  · Urine culture with no growth  · Of note he was tested positive for COVID on December 16 and was treated with vitamins, 10 days of Decadron, completed remdesivir, and 7 days of ceftriaxone and discharged on December 28   He was weaned to room air on December 25th    -S/p IV fluids with improvement    -Continue ceftriaxone- day #6    -Blood cultures 1/10/21 with no growth x72 hours    - mucinex and nasal saline

## 2021-01-15 NOTE — ASSESSMENT & PLAN NOTE
Suspected due to hypovolemia    Lab Results   Component Value Date    SODIUM 132 (L) 01/15/2021    SODIUM 133 (L) 01/14/2021    SODIUM 133 (L) 01/13/2021   Sodium improving with IV fluids- now stopped

## 2021-01-16 LAB
BACTERIA BLD CULT: NORMAL
BACTERIA BLD CULT: NORMAL

## 2021-01-16 PROCEDURE — 99232 SBSQ HOSP IP/OBS MODERATE 35: CPT | Performed by: INTERNAL MEDICINE

## 2021-01-16 RX ADMIN — FLUTICASONE PROPIONATE 1 SPRAY: 50 SPRAY, METERED NASAL at 08:49

## 2021-01-16 RX ADMIN — HEPARIN SODIUM 5000 UNITS: 5000 INJECTION INTRAVENOUS; SUBCUTANEOUS at 21:33

## 2021-01-16 RX ADMIN — Medication 1 CAPSULE: at 17:11

## 2021-01-16 RX ADMIN — LISINOPRIL 10 MG: 10 TABLET ORAL at 08:49

## 2021-01-16 RX ADMIN — Medication 1 SPRAY: at 08:49

## 2021-01-16 RX ADMIN — Medication 1 SPRAY: at 21:33

## 2021-01-16 RX ADMIN — Medication 1000 UNITS: at 08:49

## 2021-01-16 RX ADMIN — PRAVASTATIN SODIUM 40 MG: 40 TABLET ORAL at 17:12

## 2021-01-16 RX ADMIN — DOCUSATE SODIUM 100 MG: 100 CAPSULE, LIQUID FILLED ORAL at 17:12

## 2021-01-16 RX ADMIN — OMEGA-3 FATTY ACIDS CAP 1000 MG 1000 MG: 1000 CAP at 08:49

## 2021-01-16 RX ADMIN — HEPARIN SODIUM 5000 UNITS: 5000 INJECTION INTRAVENOUS; SUBCUTANEOUS at 14:13

## 2021-01-16 RX ADMIN — CEFTRIAXONE 1000 MG: 2 INJECTION, POWDER, FOR SOLUTION INTRAMUSCULAR; INTRAVENOUS at 21:33

## 2021-01-16 RX ADMIN — HYDRALAZINE HYDROCHLORIDE 10 MG: 10 TABLET, FILM COATED ORAL at 08:49

## 2021-01-16 RX ADMIN — Medication 1 TABLET: at 08:49

## 2021-01-16 RX ADMIN — DOCUSATE SODIUM 100 MG: 100 CAPSULE, LIQUID FILLED ORAL at 08:49

## 2021-01-16 RX ADMIN — GUAIFENESIN 600 MG: 600 TABLET, EXTENDED RELEASE ORAL at 21:33

## 2021-01-16 RX ADMIN — AMLODIPINE BESYLATE 10 MG: 10 TABLET ORAL at 08:49

## 2021-01-16 RX ADMIN — GUAIFENESIN 600 MG: 600 TABLET, EXTENDED RELEASE ORAL at 08:49

## 2021-01-16 RX ADMIN — ASPIRIN 81 MG: 81 TABLET, COATED ORAL at 08:49

## 2021-01-16 RX ADMIN — HEPARIN SODIUM 5000 UNITS: 5000 INJECTION INTRAVENOUS; SUBCUTANEOUS at 06:40

## 2021-01-16 RX ADMIN — PANTOPRAZOLE SODIUM 20 MG: 20 TABLET, DELAYED RELEASE ORAL at 06:40

## 2021-01-16 NOTE — PROGRESS NOTES
Progress Note Ivis Barnes 2/16/1927, 80 y o  male MRN: [id number]    [facility] Encounter: [id number]    Primary Care Provider: Rusty Cantu MD   Date and time admitted to hospital: 1/10/2021  7:46 PM        * Syncope  Assessment & Plan  · Orthostatsis vs dehydration  Does have a hx of an occipital stroke although physical exam is nonfocal  May also be related to possible underlying infection  · Last echo on November 8, 2020 with an ejection fraction of 09%, grade 1 diastolic dysfunction, mild aortic stenosis and mild aortic aneurysmal dilation of 4 0 cm   -Underwent IV fluid hydration with improvement-fluid stopped 1/14/21   -No events on tele- this has been discontinued    Pt's syncope suspected due to dehydration and possible orthostatic hypotension  No events in hospital   After discharge from hospital patient should be well hydrated and make sure his oral intake is appropriate and enough  Outpatient follow-up with his cardiologist     Sepsis due to pneumoia [facility])  Assessment & Plan  · Sepsis POA- Documented fever 100 1° WBC 20 04  · Source likely pneumonia   · CXR- right upper lung field opacification with increased right-sided interstitial markings in the mid to lower lung field  Left lung field appears clear and much improved compared to prior chest x-ray  · Leukocytosis resolved  · Urine culture with no growth  · Of note he was tested positive for COVID on December 16 and was treated with vitamins, 10 days of Decadron, completed remdesivir, and 7 days of ceftriaxone and discharged on December 28  He was weaned to room air on December 25th    -S/p IV fluids with improvement    -Continue ceftriaxone- day #7/7 today    -Blood cultures 1/10/21 with no growth x72 hours    - mucinex and nasal saline    Pt stable from resp standpoint    Hyponatremia  Assessment & Plan  Suspected due to hypovolemia    Lab Results   Component Value Date    SODIUM 132 (L) 01/15/2021    SODIUM 133 (L) 2021    SODIUM 133 (L) 2021   Sodium improving with IV fluids- now stopped    Acute renal failure superimposed on stage 3 chronic kidney disease [facility])  Assessment & Plan  Lab Results   Component Value Date    EGFR 38 01/15/2021    EGFR 34 2021    EGFR 35 2021    CREATININE 1 55 (H) 01/15/2021    CREATININE 1 70 (H) 2021    CREATININE 1 66 (H) 2021   · Presenting creatinine of 2 13  · Baseline appears to be around 1 6-1 8  · Likely secondary to dehydration, versus infection, versus urinary retention  · Urine culture with no growth  · Fluid stopped     Acute metabolic encephalopathy  Assessment & Plan  POA secondary to sepsis from pneumonia and acute hyponatremia  Currently undergoing treatment with IV antibiotics for pneumonia  Received IV fluids for hyponatremia with improvement  Now resolved    Essential hypertension  Assessment & Plan  · On lisinopril 10 mg daily, hydralazine 10 mg daily, amlodipine 10 mg daily      VTE Pharmacologic Prophylaxis:   Pharmacologic: Heparin  Mechanical VTE Prophylaxis in Place: Yes    Patient Centered Rounds: I have performed bedside rounds with nursing staff today  Discussions with Specialists or Other Care Team Provider:     Education and Discussions with Family / Patient: pt  Called and updated daughter    Time Spent for Care: 20 minutes  More than 50% of total time spent on counseling and coordination of care as described above  Current Length of Stay: 6 day(s)    Current Patient Status: Inpatient   Certification Statement: The patient will continue to require additional inpatient hospital stay due to above    Discharge Plan: in next 1-2 days     Code Status: Level 3 - DNAR and DNI      Subjective:   Pt seen and examined by me this morning  Pt  Denied any complains   Resting comfortable    Objective:     Vitals:   Temp (24hrs), Av 5 °F (36 9 °C), Min:98 °F (36 7 °C), Max:98 8 °F (37 1 °C)    Temp:  [98 °F (36 7 °C)-98 8 °F (37 1 °C)] 98 °F (36 7 °C)  HR:  [] 111  Resp:  [18-20] 20  BP: (113-127)/(71-78) 127/76  SpO2:  [91 %] 91 %  Body mass index is 20 92 kg/m²  Input and Output Summary (last 24 hours): Intake/Output Summary (Last 24 hours) at 1/16/2021 1344  Last data filed at 1/16/2021 1201  Gross per 24 hour   Intake 180 ml   Output 1325 ml   Net -1145 ml       Physical Exam:     Physical Exam    Constitutional: Pt appears comfortable  Not in any acute distress  Cardiovascular: Normal rate, regular rhythm, normal heart sounds  No murmur heard  Pulmonary/Chest: Effort normal, air entry b/l equal  No respiratory distress  Pt has no wheezes or crackles  Abdominal: Soft  Non-distended, Non-tender  Bowel sounds are normal    Neurological: awake, alert  Moving all extremities spontaneously  Psychiatric:  Pleasantly confused  No agitation  Additional Data:     Labs:    Results from last 7 days   Lab Units 01/15/21  0521  01/11/21  1225   WBC Thousand/uL 10 09   < > 12 41*   HEMOGLOBIN g/dL 8 5*   < > 8 3*   HEMATOCRIT % 25 7*   < > 25 7*   PLATELETS Thousands/uL 409*   < > 254   NEUTROS PCT %  --   --  86*   LYMPHS PCT %  --   --  6*   MONOS PCT %  --   --  6   EOS PCT %  --   --  1    < > = values in this interval not displayed       Results from last 7 days   Lab Units 01/15/21  0521 01/14/21  0458   SODIUM mmol/L 132* 133*   POTASSIUM mmol/L 3 7 3 6   CHLORIDE mmol/L 100 104   CO2 mmol/L 25 24   BUN mg/dL 20 22   CREATININE mg/dL 1 55* 1 70*   ANION GAP mmol/L 7 5   CALCIUM mg/dL 9 4 9 1   ALBUMIN g/dL  --  1 7*   TOTAL BILIRUBIN mg/dL  --  0 19*   ALK PHOS U/L  --  114   ALT U/L  --  20   AST U/L  --  22   GLUCOSE RANDOM mg/dL 84 84     Results from last 7 days   Lab Units 01/10/21  2121   INR  1 11             Results from last 7 days   Lab Units 01/15/21  0521 01/14/21  0458 01/11/21  0454 01/10/21  2121 01/10/21  2017   LACTIC ACID mmol/L  --   --   --   --  1 8   PROCALCITONIN ng/ml 0 67* 1 06* 0 76* 0 28* --            * I Have Reviewed All Lab Data Listed Above  * Additional Pertinent Lab Tests Reviewed: Lili 66 Admission Reviewed    Imaging:    Imaging Reports Reviewed Today Include:   Imaging Personally Reviewed by Myself Includes:      Recent Cultures (last 7 days):     Results from last 7 days   Lab Units 01/11/21  1225 01/10/21  2121 01/10/21  2017   BLOOD CULTURE   --  No Growth After 5 Days  No Growth After 5 Days     URINE CULTURE  No Growth <1000 cfu/mL  --   --        Last 24 Hours Medication List:   Current Facility-Administered Medications   Medication Dose Route Frequency Provider Last Rate    acetaminophen  650 mg Oral Q6H PRN Maximiliano Espinoza MD      aluminum-magnesium hydroxide-simethicone  30 mL Oral Q6H PRN Marley Taylor MD      amLODIPine  10 mg Oral Daily Marley Taylor MD      aspirin  81 mg Oral Daily Marley Taylor MD      b complex-vitamin C-folic acid  1 capsule Oral Daily With Kaden Lamas MD      cefTRIAXone  1,000 mg Intravenous Q24H Marley Taylor MD 1,000 mg (01/15/21 8103)    cholecalciferol  1,000 Units Oral Daily Marley Taylor MD      docusate sodium  100 mg Oral BID Marley Taylor MD      fish oil  1,000 mg Oral Daily Marley Taylor MD      fluticasone  1 spray Each Nare Daily Marley Taylor MD      guaiFENesin  600 mg Oral Q12H [address] Rosalva Lehman PA-C      heparin (porcine)  5,000 Units Subcutaneous [facility] [address] Marley Taylor MD      hydrALAZINE  10 mg Oral Daily Marley Taylor MD      lisinopril  10 mg Oral Daily Marley Taylor MD      multivitamin-minerals  1 tablet Oral Daily Marley Taylor MD      ondansetron  4 mg Intravenous Q6H PRN Marley Taylor MD      pantoprazole  20 mg Oral Early Morning Marley Taylor MD      pravastatin  40 mg Oral Daily With Kaden Lamas MD      sodium chloride  1 spray Each Nare BID Tom Schafer PA-C      sodium chloride (PF)  3 mL Intravenous Q1H PRN Leena Shah MD          Today, Patient Was Seen By: Lanie Richter MD    ** Please Note: Dictation voice to text software may have been used in the creation of this document   **

## 2021-01-16 NOTE — ASSESSMENT & PLAN NOTE
· Sepsis POA- Documented fever 100 1° WBC 20 04  · Source likely pneumonia   · CXR- right upper lung field opacification with increased right-sided interstitial markings in the mid to lower lung field  Left lung field appears clear and much improved compared to prior chest x-ray  · Leukocytosis resolved  · Urine culture with no growth  · Of note he was tested positive for COVID on December 16 and was treated with vitamins, 10 days of Decadron, completed remdesivir, and 7 days of ceftriaxone and discharged on December 28   He was weaned to room air on December 25th    -S/p IV fluids with improvement    -Continue ceftriaxone- day #7/7 today    -Blood cultures 1/10/21 with no growth x72 hours    - mucinex and nasal saline    Pt stable from resp standpoint

## 2021-01-16 NOTE — ASSESSMENT & PLAN NOTE
· Orthostatsis vs dehydration  Does have a hx of an occipital stroke although physical exam is nonfocal  May also be related to possible underlying infection  · Last echo on November 8, 2020 with an ejection fraction of 09%, grade 1 diastolic dysfunction, mild aortic stenosis and mild aortic aneurysmal dilation of 4 0 cm   -Underwent IV fluid hydration with improvement-fluid stopped 1/14/21   -No events on tele- this has been discontinued    Pt's syncope suspected due to dehydration and possible orthostatic hypotension  No events in hospital   After discharge from hospital patient should be well hydrated and make sure his oral intake is appropriate and enough     Outpatient follow-up with his cardiologist

## 2021-01-16 NOTE — PLAN OF CARE
Problem: SAFETY ADULT  Goal: Patient will remain free of falls  Description: INTERVENTIONS:  - Assess patient frequently for physical needs  -  Identify cognitive and physical deficits and behaviors that affect risk of falls    -  [place] fall precautions as indicated by assessment   - Educate patient/family on patient safety including physical limitations  - Instruct patient to call for assistance with activity based on assessment  - Modify environment to reduce risk of injury  - Consider OT/PT consult to assist with strengthening/mobility  Outcome: Progressing  Goal: Maintain or return to baseline ADL function  Description: INTERVENTIONS:  -  Assess patient's ability to carry out ADLs; assess patient's baseline for ADL function and identify physical deficits which impact ability to perform ADLs (bathing, care of mouth/teeth, toileting, grooming, dressing, etc )  - Assess/evaluate cause of self-care deficits   - Assess range of motion  - Assess patient's mobility; develop plan if impaired  - Assess patient's need for assistive devices and provide as appropriate  - Encourage maximum independence but intervene and supervise when necessary  - Involve family in performance of ADLs  - Assess for home care needs following discharge   - Consider OT consult to assist with ADL evaluation and planning for discharge  - Provide patient education as appropriate  Outcome: Progressing  Goal: Maintain or return mobility status to optimal level  Description: INTERVENTIONS:  - Assess patient's baseline mobility status (ambulation, transfers, stairs, etc )    - Identify cognitive and physical deficits and behaviors that affect mobility  - Identify mobility aids required to assist with transfers and/or ambulation (gait belt, sit-to-stand, lift, walker, cane, etc )  - [place] fall precautions as indicated by assessment  - Record patient progress and toleration of activity level on Mobility SBAR; progress patient to next Phase/Stage  - Instruct patient to call for assistance with activity based on assessment  - Consider rehabilitation consult to assist with strengthening/weightbearing, etc   Outcome: Progressing     Problem: Knowledge Deficit  Goal: Patient/family/caregiver demonstrates understanding of disease process, treatment plan, medications, and discharge instructions  Description: Complete learning assessment and assess knowledge base  Interventions:  - Provide teaching at level of understanding  - Provide teaching via preferred learning methods  Outcome: Progressing     Problem: Potential for Falls  Goal: Patient will remain free of falls  Description: INTERVENTIONS:  - Assess patient frequently for physical needs  -  Identify cognitive and physical deficits and behaviors that affect risk of falls    -  [place] fall precautions as indicated by assessment   - Educate patient/family on patient safety including physical limitations  - Instruct patient to call for assistance with activity based on assessment  - Modify environment to reduce risk of injury  - Consider OT/PT consult to assist with strengthening/mobility  Outcome: Progressing     Problem: Prexisting or High Potential for Compromised Skin Integrity  Goal: Skin integrity is maintained or improved  Description: INTERVENTIONS:  - Identify patients at risk for skin breakdown  - Assess and monitor skin integrity  - Assess and monitor nutrition and hydration status  - Monitor labs   - Assess for incontinence   - Turn and reposition patient  - Assist with mobility/ambulation  - Relieve pressure over bony prominences  - Avoid friction and shearing  - Provide appropriate hygiene as needed including keeping skin clean and dry  - Evaluate need for skin moisturizer/barrier cream  - Collaborate with interdisciplinary team   - Patient/family teaching  - Consider wound care consult   Outcome: Progressing     Problem: Nutrition/Hydration-ADULT  Goal: Nutrient/Hydration intake appropriate for improving, restoring or maintaining nutritional needs  Description: Monitor and assess patient's nutrition/hydration status for malnutrition  Collaborate with interdisciplinary team and initiate plan and interventions as ordered  Monitor patient's weight and dietary intake as ordered or per policy  Utilize nutrition screening tool and intervene as necessary  Determine patient's food preferences and provide high-protein, high-caloric foods as appropriate       INTERVENTIONS:  - Monitor oral intake, urinary output, labs, and treatment plans  - Assess nutrition and hydration status and recommend course of action  - Evaluate amount of meals eaten  - Assist patient with eating if necessary   - Allow adequate time for meals  - Recommend/ encourage appropriate diets, oral nutritional supplements, and vitamin/mineral supplements  - Order, calculate, and assess calorie counts as needed  - Recommend, monitor, and adjust tube feedings and TPN/PPN based on assessed needs  - Assess need for intravenous fluids  - Provide specific nutrition/hydration education as appropriate  - Include patient/family/caregiver in decisions related to nutrition  Outcome: Progressing     Problem: RESPIRATORY - ADULT  Goal: Achieves optimal ventilation and oxygenation  Description: INTERVENTIONS:  - Assess for changes in respiratory status  - Assess for changes in mentation and behavior  - Position to facilitate oxygenation and minimize respiratory effort  - Oxygen administered by appropriate delivery if ordered  - Initiate smoking cessation education as indicated  - Encourage broncho-pulmonary hygiene including cough, deep breathe, Incentive Spirometry  - Assess the need for suctioning and aspirate as needed  - Assess and instruct to report SOB or any respiratory difficulty  - Respiratory Therapy support as indicated  Outcome: Progressing     Problem: METABOLIC, FLUID AND ELECTROLYTES - ADULT  Goal: Electrolytes maintained within normal limits  Description: INTERVENTIONS:  - Monitor labs and assess patient for signs and symptoms of electrolyte imbalances  - Administer electrolyte replacement as ordered  - Monitor response to electrolyte replacements, including repeat lab results as appropriate  - Instruct patient on fluid and nutrition as appropriate  Outcome: Progressing  Goal: Fluid balance maintained  Description: INTERVENTIONS:  - Monitor labs   - Monitor I/O and WT  - Instruct patient on fluid and nutrition as appropriate  - Assess for signs & symptoms of volume excess or deficit  Outcome: Progressing     Problem: SKIN/TISSUE INTEGRITY - ADULT  Goal: Skin integrity remains intact  Description: INTERVENTIONS  - Identify patients at risk for skin breakdown  - Assess and monitor skin integrity  - Assess and monitor nutrition and hydration status  - Monitor labs (i e  albumin)  - Assess for incontinence   - Turn and reposition patient  - Assist with mobility/ambulation  - Relieve pressure over bony prominences  - Avoid friction and shearing  - Provide appropriate hygiene as needed including keeping skin clean and dry  - Evaluate need for skin moisturizer/barrier cream  - Collaborate with interdisciplinary team (i e  Nutrition, Rehabilitation, etc )   - Patient/family teaching  Outcome: Progressing     Problem: HEMATOLOGIC - ADULT  Goal: Maintains hematologic stability  Description: INTERVENTIONS  - Assess for signs and symptoms of bleeding or hemorrhage  - Monitor labs  - Administer supportive blood products/factors as ordered and appropriate  Outcome: Progressing

## 2021-01-17 PROCEDURE — 99232 SBSQ HOSP IP/OBS MODERATE 35: CPT | Performed by: INTERNAL MEDICINE

## 2021-01-17 PROCEDURE — 97163 PT EVAL HIGH COMPLEX 45 MIN: CPT

## 2021-01-17 RX ADMIN — HEPARIN SODIUM 5000 UNITS: 5000 INJECTION INTRAVENOUS; SUBCUTANEOUS at 05:26

## 2021-01-17 RX ADMIN — HEPARIN SODIUM 5000 UNITS: 5000 INJECTION INTRAVENOUS; SUBCUTANEOUS at 14:15

## 2021-01-17 RX ADMIN — AMLODIPINE BESYLATE 10 MG: 10 TABLET ORAL at 08:12

## 2021-01-17 RX ADMIN — Medication 1000 UNITS: at 08:12

## 2021-01-17 RX ADMIN — GUAIFENESIN 600 MG: 600 TABLET, EXTENDED RELEASE ORAL at 21:02

## 2021-01-17 RX ADMIN — DOCUSATE SODIUM 100 MG: 100 CAPSULE, LIQUID FILLED ORAL at 17:10

## 2021-01-17 RX ADMIN — DOCUSATE SODIUM 100 MG: 100 CAPSULE, LIQUID FILLED ORAL at 08:12

## 2021-01-17 RX ADMIN — PRAVASTATIN SODIUM 40 MG: 40 TABLET ORAL at 17:10

## 2021-01-17 RX ADMIN — ASPIRIN 81 MG: 81 TABLET, COATED ORAL at 08:14

## 2021-01-17 RX ADMIN — PANTOPRAZOLE SODIUM 20 MG: 20 TABLET, DELAYED RELEASE ORAL at 05:26

## 2021-01-17 RX ADMIN — HYDRALAZINE HYDROCHLORIDE 10 MG: 10 TABLET, FILM COATED ORAL at 08:12

## 2021-01-17 RX ADMIN — FLUTICASONE PROPIONATE 1 SPRAY: 50 SPRAY, METERED NASAL at 08:13

## 2021-01-17 RX ADMIN — LISINOPRIL 10 MG: 10 TABLET ORAL at 08:12

## 2021-01-17 RX ADMIN — Medication 1 TABLET: at 08:12

## 2021-01-17 RX ADMIN — OMEGA-3 FATTY ACIDS CAP 1000 MG 1000 MG: 1000 CAP at 08:12

## 2021-01-17 RX ADMIN — Medication 1 CAPSULE: at 17:10

## 2021-01-17 RX ADMIN — Medication 1 SPRAY: at 08:12

## 2021-01-17 RX ADMIN — GUAIFENESIN 600 MG: 600 TABLET, EXTENDED RELEASE ORAL at 08:12

## 2021-01-17 NOTE — ASSESSMENT & PLAN NOTE
· Sepsis POA- Documented fever 100 1° WBC 20 04  · CXR- right upper lung field opacification with increased right-sided interstitial markings in the mid to lower lung field  Left lung field appears clear and much improved compared to prior chest x-ray  · Leukocytosis resolved  · Urine culture with no growth  · Of note he was tested positive for COVID on December 16 and was treated with vitamins, 10 days of Decadron, completed remdesivir, and 7 days of ceftriaxone and discharged on December 28   He was weaned to room air on December 25th    -S/p IV fluids with improvement    -completed antibiotic     -Blood cultures 1/10/21 with no growth x72 hours    - mucinex and nasal saline    Pt stable from resp standpoint

## 2021-01-17 NOTE — ASSESSMENT & PLAN NOTE
Results from last 7 days   Lab Units 01/15/21  0521 01/14/21  0458 01/13/21  0935 01/13/21  0522 01/11/21  1225   HEMOGLOBIN g/dL 8 5* 7 3* 7 8* 6 9* 8 3*     · With history of underlying CKD, suspect acute component dilutional given recent IV fluids  · Fecal occult blood test negative x1  · No bloody vomit or black or bloody bowel movements  · Fluids have been stopped    · Hb improving

## 2021-01-17 NOTE — PHYSICAL THERAPY NOTE
Physical Therapy Evaluation    Patient's Name: Sally Olson    Admitting Diagnosis  Syncope [R55]  HANNA (acute kidney injury) ([address] ) [N17 9]  Right middle lobe pneumonia [J18 9]    Problem List  Patient Active Problem List   Diagnosis    Essential hypertension    HLD (hyperlipidemia)    Syncope    Sepsis due to COVID-19 [facility])    Carotid artery stenosis    GERD (gastroesophageal reflux disease)    Premature atrial contractions    Sinus bradycardia    H/O carotid endarterectomy    Squamous cell carcinoma, leg, left    Occipital infarction ([address] )    Basal ganglia stroke (HCC)    Leukocytosis    Ambulatory dysfunction    Acute renal failure superimposed on stage 3 chronic kidney disease (HCC)    Back pain    Decreased oral intake    Neck pain    Primary insomnia    Hyponatremia    COVID-19 virus infection    Goals of care, counseling/discussion    Acute respiratory failure with hypoxia ([address] )    Acute encephalopathy    Severe protein-calorie malnutrition ([address] )    Sepsis due to pneumoia ([address] )    History of stroke    Anemia    Acute metabolic encephalopathy       Past Medical History  Past Medical History:   Diagnosis Date    HANNA (acute kidney injury) ([address] ) 6/20/2017    Arthritis     Cataracts, both eyes     HCAP (healthcare-associated pneumonia) 10/24/2019    Hyperlipidemia     Hypertension     Problems with hearing     Severe sepsis ([address] ) 6/20/2017    Stroke ([address] )     Syncope 10/23/2019       Past Surgical History  Past Surgical History:   Procedure Laterality Date    CAROTID ARTERY ANGIOPLASTY      CATARACT EXTRACTION          01/17/21 0943   PT Last Visit   PT Visit Date 01/17/21   Note Type   Note type Evaluation   Pain Assessment   Pain Assessment Tool Pain Assessment not indicated - pt denies pain   Pain Score No Pain   Home Living   Additional Comments Pt attending rehab at [facility], prior to initial admission 12/22 he was in process of transitioning from independent living to personal care  Per EMR, pt scheduled to complete rehab at the end of this week and D/C to personal care  Prior Function   Level of [place] Needs assistance with ADLs and functional mobility   Restrictions/Precautions   Weight Bearing Precautions Per Order No   Other Precautions Cognitive; Chair Alarm; Fall Risk;Hard of hearing   General   Family/Caregiver Present No   Cognition   Overall Cognitive Status Impaired   Arousal/Participation Alert   Orientation Level Oriented to person;Oriented to place; Disoriented to time;Disoriented to situation   Following Commands Follows one step commands with increased time or repetition   Comments Pt very [place], requires simple commands and gestures  RLE Assessment   RLE Assessment WFL  (Grossly 4/5)   LLE Assessment   LLE Assessment WFL  (Grossly 4/5)   Light Touch   RLE Light Touch Grossly intact   LLE Light Touch Grossly intact   Bed Mobility   Supine to Sit 4  Minimal assistance   Additional items Assist x 1; Increased time required;Verbal cues   Transfers   Sit to Stand 4  Minimal assistance   Additional items Assist x 1; Increased time required;Verbal cues   Stand to Sit 4  Minimal assistance   Additional items Assist x 1; Increased time required;Verbal cues   Additional Comments Denies orthostatic symptoms throughout, increased time with positional changes  VC's for hand placement   Ambulation/Elevation   Gait pattern Improper Weight shift;Narrow NNAMDI; Decreased foot clearance; Foward flexed; Short stride   Gait Assistance 4  Minimal assist   Additional items Assist x 1   Assistive Device Rolling walker   Distance 15 ft, limited by reported fatigue  HR max 115 bpm with activity, O2 stable on RA  Balance   Static Sitting Fair   Dynamic Sitting Poor +   Static Standing Fair -   Dynamic Standing Poor +   Ambulatory Poor +   Activity Tolerance   Activity Tolerance Patient limited by fatigue   Nurse Made Aware RN updated   Chair alarm engaged   Assessment   Prognosis Good Problem List Decreased strength;Decreased endurance; Impaired balance;Decreased mobility; Decreased cognition;Decreased safety awareness   Assessment Pt is a 80 y o  male seen for PT evaluation s/p admit to [facility] on 1/10/2021  Pt was admitted with a primary dx of: syncope  PT now consulted for assessment of mobility and d/c needs  Pt with Up with assistance orders  Pts current comorbidities effecting treatment include: HTN, ARF, GERD, CVA, [place] IRA  Pts current clinical presentation is Unstable/ Unpredictable (high complexity) due to Ongoing medical management for primary dx, Decreased activity tolerance compared to baseline, Fall risk, Increased assistance needed from caregiver at current time, Cog status, Trending lab values  Prior to admission, pt was attending rehab at [facility] with plans to transition to personal care unit following discharge from rehab  Upon evaluation, pt currently is requiring [place] for bed mobility; [place] for transfers and [place] for ambulation 15 ft w/ RW  Pt presents at PT eval functioning below baseline and currently w/ overall mobility deficits 2* to: BLE weakness, impaired balance, decreased endurance, gait deviations, decreased activity tolerance compared to baseline, decreased functional mobility tolerance compared to baseline, decreased safety awareness, fall risk, decreased cognition  Pt currently at a fall risk 2* to impairments listed above  Pt will continue to benefit from skilled acute PT interventions to address stated impairments; to maximize functional mobility; for ongoing pt/ family training; and DME needs  At conclusion of PT session pt returned back in chair and chair alarm engaged with phone and call bell within reach  Pt denies any further questions at this time  Recommend return to IP rehab upon hospital D/C  Goals   Patient Goals to go to sleep   STG Expiration Date 01/31/21   Short Term Goal #1 In 14 days pt will be able to: 1   Demonstrate ability to perform all aspects of bed mobility independently to increase functional independence  2  Perform functional transfers with RW and supervision to facilitate safe return to previous living environment  3   Ambulate 150 ft with RW and supervision with stable vitals to improve safety with household distances and reduce fall risk  4  Improve LE strength grades by 1 to increase ease of functional mobility with transfers and gait  5  Pt will demonstrate improved balance by one grade in order to decrease risk of falls  PT Treatment Day 0   Plan   Treatment/Interventions Functional transfer training;LE strengthening/ROM; Therapeutic exercise; Endurance training;Cognitive reorientation;Patient/family training;Equipment eval/education; Bed mobility;Gait training   PT Frequency Other (Comment)  (3-5x/week)   Recommendation   PT Discharge Recommendation Post-Acute Rehabilitation Services   PT - OK to Discharge Yes  (to IP rehab)   [place] 8 in Bed Without Bedrails 3   Lying on Back to Sitting on Edge of Flat Bed 3   Moving Bed to Chair 3   Standing Up From Chair 3   Walk in Room 3   Climb 3-5 Stairs 2   Basic Mobility Inpatient Raw Score 17   Basic Mobility Standardized Score 39 67       Corinne Rater, PT, DPT

## 2021-01-17 NOTE — ASSESSMENT & PLAN NOTE
POA secondary to sepsis from pneumonia and acute hyponatremia  Currently undergoing treatment with IV antibiotics for pneumonia  Received IV fluids for hyponatremia with improvement  Pt still a little confused - ? baseline

## 2021-01-17 NOTE — ASSESSMENT & PLAN NOTE
· Orthostatsis vs dehydration  Does have a hx of an occipital stroke although physical exam is nonfocal  May also be related to possible underlying infection  · Last echo on November 8, 2020 with an ejection fraction of 82%, grade 1 diastolic dysfunction, mild aortic stenosis and mild aortic aneurysmal dilation of 4 0 cm   -Underwent IV fluid hydration with improvement-fluid stopped 1/14/21   -No events on tele- this has been discontinued    Pt's syncope suspected due to dehydration and possible orthostatic hypotension  No events in hospital   After discharge from hospital patient should be well hydrated and make sure his oral intake is appropriate and enough     Outpatient follow-up with his cardiologist

## 2021-01-17 NOTE — PLAN OF CARE
Problem: SAFETY ADULT  Goal: Patient will remain free of falls  Description: INTERVENTIONS:  - Assess patient frequently for physical needs  -  Identify cognitive and physical deficits and behaviors that affect risk of falls    -  [place] fall precautions as indicated by assessment   - Educate patient/family on patient safety including physical limitations  - Instruct patient to call for assistance with activity based on assessment  - Modify environment to reduce risk of injury  - Consider OT/PT consult to assist with strengthening/mobility  Outcome: Progressing  Goal: Maintain or return to baseline ADL function  Description: INTERVENTIONS:  -  Assess patient's ability to carry out ADLs; assess patient's baseline for ADL function and identify physical deficits which impact ability to perform ADLs (bathing, care of mouth/teeth, toileting, grooming, dressing, etc )  - Assess/evaluate cause of self-care deficits   - Assess range of motion  - Assess patient's mobility; develop plan if impaired  - Assess patient's need for assistive devices and provide as appropriate  - Encourage maximum independence but intervene and supervise when necessary  - Involve family in performance of ADLs  - Assess for home care needs following discharge   - Consider OT consult to assist with ADL evaluation and planning for discharge  - Provide patient education as appropriate  Outcome: Progressing  Goal: Maintain or return mobility status to optimal level  Description: INTERVENTIONS:  - Assess patient's baseline mobility status (ambulation, transfers, stairs, etc )    - Identify cognitive and physical deficits and behaviors that affect mobility  - Identify mobility aids required to assist with transfers and/or ambulation (gait belt, sit-to-stand, lift, walker, cane, etc )  - [place] fall precautions as indicated by assessment  - Record patient progress and toleration of activity level on Mobility SBAR; progress patient to next Phase/Stage  - Instruct patient to call for assistance with activity based on assessment  - Consider rehabilitation consult to assist with strengthening/weightbearing, etc   Outcome: Progressing     Problem: Knowledge Deficit  Goal: Patient/family/caregiver demonstrates understanding of disease process, treatment plan, medications, and discharge instructions  Description: Complete learning assessment and assess knowledge base  Interventions:  - Provide teaching at level of understanding  - Provide teaching via preferred learning methods  Outcome: Progressing     Problem: Potential for Falls  Goal: Patient will remain free of falls  Description: INTERVENTIONS:  - Assess patient frequently for physical needs  -  Identify cognitive and physical deficits and behaviors that affect risk of falls    -  [place] fall precautions as indicated by assessment   - Educate patient/family on patient safety including physical limitations  - Instruct patient to call for assistance with activity based on assessment  - Modify environment to reduce risk of injury  - Consider OT/PT consult to assist with strengthening/mobility  Outcome: Progressing     Problem: Prexisting or High Potential for Compromised Skin Integrity  Goal: Skin integrity is maintained or improved  Description: INTERVENTIONS:  - Identify patients at risk for skin breakdown  - Assess and monitor skin integrity  - Assess and monitor nutrition and hydration status  - Monitor labs   - Assess for incontinence   - Turn and reposition patient  - Assist with mobility/ambulation  - Relieve pressure over bony prominences  - Avoid friction and shearing  - Provide appropriate hygiene as needed including keeping skin clean and dry  - Evaluate need for skin moisturizer/barrier cream  - Collaborate with interdisciplinary team   - Patient/family teaching  - Consider wound care consult   Outcome: Progressing     Problem: Nutrition/Hydration-ADULT  Goal: Nutrient/Hydration intake appropriate for improving, restoring or maintaining nutritional needs  Description: Monitor and assess patient's nutrition/hydration status for malnutrition  Collaborate with interdisciplinary team and initiate plan and interventions as ordered  Monitor patient's weight and dietary intake as ordered or per policy  Utilize nutrition screening tool and intervene as necessary  Determine patient's food preferences and provide high-protein, high-caloric foods as appropriate       INTERVENTIONS:  - Monitor oral intake, urinary output, labs, and treatment plans  - Assess nutrition and hydration status and recommend course of action  - Evaluate amount of meals eaten  - Assist patient with eating if necessary   - Allow adequate time for meals  - Recommend/ encourage appropriate diets, oral nutritional supplements, and vitamin/mineral supplements  - Order, calculate, and assess calorie counts as needed  - Recommend, monitor, and adjust tube feedings and TPN/PPN based on assessed needs  - Assess need for intravenous fluids  - Provide specific nutrition/hydration education as appropriate  - Include patient/family/caregiver in decisions related to nutrition  Outcome: Progressing     Problem: RESPIRATORY - ADULT  Goal: Achieves optimal ventilation and oxygenation  Description: INTERVENTIONS:  - Assess for changes in respiratory status  - Assess for changes in mentation and behavior  - Position to facilitate oxygenation and minimize respiratory effort  - Oxygen administered by appropriate delivery if ordered  - Initiate smoking cessation education as indicated  - Encourage broncho-pulmonary hygiene including cough, deep breathe, Incentive Spirometry  - Assess the need for suctioning and aspirate as needed  - Assess and instruct to report SOB or any respiratory difficulty  - Respiratory Therapy support as indicated  Outcome: Progressing     Problem: METABOLIC, FLUID AND ELECTROLYTES - ADULT  Goal: Electrolytes maintained within normal limits  Description: INTERVENTIONS:  - Monitor labs and assess patient for signs and symptoms of electrolyte imbalances  - Administer electrolyte replacement as ordered  - Monitor response to electrolyte replacements, including repeat lab results as appropriate  - Instruct patient on fluid and nutrition as appropriate  Outcome: Progressing  Goal: Fluid balance maintained  Description: INTERVENTIONS:  - Monitor labs   - Monitor I/O and WT  - Instruct patient on fluid and nutrition as appropriate  - Assess for signs & symptoms of volume excess or deficit  Outcome: Progressing     Problem: SKIN/TISSUE INTEGRITY - ADULT  Goal: Skin integrity remains intact  Description: INTERVENTIONS  - Identify patients at risk for skin breakdown  - Assess and monitor skin integrity  - Assess and monitor nutrition and hydration status  - Monitor labs (i e  albumin)  - Assess for incontinence   - Turn and reposition patient  - Assist with mobility/ambulation  - Relieve pressure over bony prominences  - Avoid friction and shearing  - Provide appropriate hygiene as needed including keeping skin clean and dry  - Evaluate need for skin moisturizer/barrier cream  - Collaborate with interdisciplinary team (i e  Nutrition, Rehabilitation, etc )   - Patient/family teaching  Outcome: Progressing     Problem: HEMATOLOGIC - ADULT  Goal: Maintains hematologic stability  Description: INTERVENTIONS  - Assess for signs and symptoms of bleeding or hemorrhage  - Monitor labs  - Administer supportive blood products/factors as ordered and appropriate  Outcome: Progressing

## 2021-01-17 NOTE — PROGRESS NOTES
Progress Note Yanet Ruffin 2/16/1927, 80 y o  male MRN: [id number]    [facility] Encounter: [id number]    Primary Care Provider: Dominique Bean MD   Date and time admitted to hospital: 1/10/2021  7:46 PM        * Syncope  Assessment & Plan  · Orthostatsis vs dehydration  Does have a hx of an occipital stroke although physical exam is nonfocal  May also be related to possible underlying infection  · Last echo on November 8, 2020 with an ejection fraction of 80%, grade 1 diastolic dysfunction, mild aortic stenosis and mild aortic aneurysmal dilation of 4 0 cm   -Underwent IV fluid hydration with improvement-fluid stopped 1/14/21   -No events on tele- this has been discontinued    Pt's syncope suspected due to dehydration and possible orthostatic hypotension  No events in hospital   After discharge from hospital patient should be well hydrated and make sure his oral intake is appropriate and enough  Outpatient follow-up with his cardiologist     Sepsis due to pneumoia [facility])  Assessment & Plan  · Sepsis POA- Documented fever 100 1° WBC 20 04  · CXR- right upper lung field opacification with increased right-sided interstitial markings in the mid to lower lung field  Left lung field appears clear and much improved compared to prior chest x-ray  · Leukocytosis resolved  · Urine culture with no growth  · Of note he was tested positive for COVID on December 16 and was treated with vitamins, 10 days of Decadron, completed remdesivir, and 7 days of ceftriaxone and discharged on December 28  He was weaned to room air on December 25th    -S/p IV fluids with improvement    -completed antibiotic     -Blood cultures 1/10/21 with no growth x72 hours    - mucinex and nasal saline    Pt stable from resp standpoint    Hyponatremia  Assessment & Plan  Suspected due to hypovolemia    Lab Results   Component Value Date    SODIUM 132 (L) 01/15/2021    SODIUM 133 (L) 01/14/2021    SODIUM 133 (L) 01/13/2021   Sodium improving with IV fluids- now stopped    Acute renal failure superimposed on stage 3 chronic kidney disease [facility])  Assessment & Plan  Lab Results   Component Value Date    EGFR 38 01/15/2021    EGFR 34 01/14/2021    EGFR 35 01/13/2021    CREATININE 1 55 (H) 01/15/2021    CREATININE 1 70 (H) 01/14/2021    CREATININE 1 66 (H) 01/13/2021   · Presenting creatinine of 2 13  · Baseline appears to be around 1 6-1 8  · Likely secondary to dehydration, versus infection, versus urinary retention  · Urine culture with no growth  · Fluid stopped 7/96/17    Acute metabolic encephalopathy  Assessment & Plan  POA secondary to sepsis from pneumonia and acute hyponatremia  Currently undergoing treatment with IV antibiotics for pneumonia  Received IV fluids for hyponatremia with improvement  Pt still a little confused - ? baseline    Anemia  Assessment & Plan  Results from last 7 days   Lab Units 01/15/21  0521 01/14/21  0458 01/13/21  0935 01/13/21  0522 01/11/21  1225   HEMOGLOBIN g/dL 8 5* 7 3* 7 8* 6 9* 8 3*     · With history of underlying CKD, suspect acute component dilutional given recent IV fluids  · Fecal occult blood test negative x1  · No bloody vomit or black or bloody bowel movements  · Fluids have been stopped  · Hb improving    Essential hypertension  Assessment & Plan  · On lisinopril 10 mg daily, hydralazine 10 mg daily, amlodipine 10 mg daily      VTE Pharmacologic Prophylaxis:   Pharmacologic: Heparin  Mechanical VTE Prophylaxis in Place: Yes    Patient Centered Rounds: I have performed bedside rounds with nursing staff today  Discussions with Specialists or Other Care Team Provider:     Education and Discussions with Family / Patient: pt    Time Spent for Care: 20 minutes  More than 50% of total time spent on counseling and coordination of care as described above      Current Length of Stay: 7 day(s)    Current Patient Status: Inpatient   Certification Statement: The patient will continue to require additional inpatient hospital stay due to above    Discharge Plan: possibly back to facility tomorrow    Code Status: Level 3 - DNAR and DNI      Subjective:   Pt seen and examined by me this morning  Pt said he doesn't know how she feels  Objective:     Vitals:   Temp (24hrs), Av 9 °F (37 2 °C), Min:98 5 °F (36 9 °C), Max:99 1 °F (37 3 °C)    Temp:  [98 5 °F (36 9 °C)-99 1 °F (37 3 °C)] 99 °F (37 2 °C)  HR:  [] 118  Resp:  [16-20] 16  BP: (119-129)/(71-80) 119/80  SpO2:  [89 %-95 %] 89 %  Body mass index is 19 45 kg/m²  Input and Output Summary (last 24 hours): Intake/Output Summary (Last 24 hours) at 2021 1429  Last data filed at 2021 1300  Gross per 24 hour   Intake 1080 ml   Output 2050 ml   Net -970 ml       Physical Exam:     Physical Exam    Constitutional: Pt appears comfortable  Not in any acute distress  Cardiovascular: Normal rate, regular rhythm, normal heart sounds  No murmur heard  Pulmonary/Chest: Effort normal, air entry b/l equal  No respiratory distress  Pt has no wheezes or crackles  Abdominal: Soft  Non-distended, Non-tender  Bowel sounds are normal    Musculoskeletal: Normal range of motion  Neurological: awake, alert  Moving all extremities spontaneously  Psychiatric: pleasantly confused    Additional Data:     Labs:    Results from last 7 days   Lab Units 01/15/21  0521  21  1225   WBC Thousand/uL 10 09   < > 12 41*   HEMOGLOBIN g/dL 8 5*   < > 8 3*   HEMATOCRIT % 25 7*   < > 25 7*   PLATELETS Thousands/uL 409*   < > 254   NEUTROS PCT %  --   --  86*   LYMPHS PCT %  --   --  6*   MONOS PCT %  --   --  6   EOS PCT %  --   --  1    < > = values in this interval not displayed       Results from last 7 days   Lab Units 01/15/21  0521 21  0458   SODIUM mmol/L 132* 133*   POTASSIUM mmol/L 3 7 3 6   CHLORIDE mmol/L 100 104   CO2 mmol/L 25 24   BUN mg/dL 20 22   CREATININE mg/dL 1 55* 1 70*   ANION GAP mmol/L 7 5   CALCIUM mg/dL 9 4 9 1   ALBUMIN g/dL  --  1 7*   TOTAL BILIRUBIN mg/dL  --  0 19*   ALK PHOS U/L  --  114   ALT U/L  --  20   AST U/L  --  22   GLUCOSE RANDOM mg/dL 84 84     Results from last 7 days   Lab Units 01/10/21  2121   INR  1 11             Results from last 7 days   Lab Units 01/15/21  0521 01/14/21  0458 01/11/21  0454 01/10/21  2121 01/10/21  2017   LACTIC ACID mmol/L  --   --   --   --  1 8   PROCALCITONIN ng/ml 0 67* 1 06* 0 76* 0 28*  --            * I Have Reviewed All Lab Data Listed Above  * Additional Pertinent Lab Tests Reviewed: Lili 66 Admission Reviewed    Imaging:    Imaging Reports Reviewed Today Include:   Imaging Personally Reviewed by Myself Includes:     Recent Cultures (last 7 days):     Results from last 7 days   Lab Units 01/11/21  1225 01/10/21  2121 01/10/21  2017   BLOOD CULTURE   --  No Growth After 5 Days  No Growth After 5 Days     URINE CULTURE  No Growth <1000 cfu/mL  --   --        Last 24 Hours Medication List:   Current Facility-Administered Medications   Medication Dose Route Frequency Provider Last Rate    acetaminophen  650 mg Oral Q6H PRN Dara Loera MD      aluminum-magnesium hydroxide-simethicone  30 mL Oral Q6H PRN Chan Blood MD      amLODIPine  10 mg Oral Daily Chan MD Caitie      aspirin  81 mg Oral Daily Chan MD Caitie      b complex-vitamin C-folic acid  1 capsule Oral Daily With Terry Kwong MD      cholecalciferol  1,000 Units Oral Daily Chanluke Blood MD      docusate sodium  100 mg Oral BID Chanluke Blood MD      fish oil  1,000 mg Oral Daily Chan MD Caitie      fluticasone  1 spray Each Nare Daily Chanluke Blood MD      guaiFENesin  600 mg Oral Q12H [facility], [facility]      heparin (porcine)  5,000 Units Subcutaneous [facility] Chan Blood MD      hydrALAZINE  10 mg Oral Daily Chan MD Caitie      lisinopril  10 mg Oral Daily Chan MD Caitie      multivitamin-minerals  1 tablet Oral Daily Zackery Rodriguez MD      ondansetron  4 mg Intravenous Q6H PRN Zackery Rodriguez MD      pantoprazole  20 mg Oral Early Morning Zackery Rodriguez MD      pravastatin  40 mg Oral Daily With Marcela Gaucher, MD      sodium chloride (PF)  3 mL Intravenous Q1H PRN Zackery Rodriguez MD          Today, Patient Was Seen By: Chiki Katz MD    ** Please Note: Dictation voice to text software may have been used in the creation of this document   **

## 2021-01-17 NOTE — PLAN OF CARE
Problem: PHYSICAL THERAPY ADULT  Goal: Performs mobility at highest level of function for planned discharge setting  See evaluation for individualized goals  Description: Treatment/Interventions: Functional transfer training, LE strengthening/ROM, Therapeutic exercise, Endurance training, Cognitive reorientation, Patient/family training, Equipment eval/education, Bed mobility, Gait training          See flowsheet documentation for full assessment, interventions and recommendations  Note: Prognosis: Good  Problem List: Decreased strength, Decreased endurance, Impaired balance, Decreased mobility, Decreased cognition, Decreased safety awareness  Assessment: Pt is a 80 y o  male seen for PT evaluation s/p [facility] on [facility]/10/[facility]  Pt was admitted with a primary dx of: syncope  PT now consulted for assessment of mobility and d/c needs  Pt with Up with assistance orders  Pts current comorbidities effecting treatment include: HTN, ARF, GERD, CVA, [place]  Pts current clinical presentation is Unstable/ Unpredictable (high complexity) due to Ongoing medical management for primary dx, Decreased activity tolerance compared to baseline, Fall risk, Increased assistance needed from caregiver at current time, Cog status, Trending lab values  Prior to admission, pt was attending rehab at SNF with plans to transition to personal care unit following discharge from rehab  Upon evaluation, pt currently is requiring Jimena for bed mobility; Jimena for transfers and Jimena for ambulation 15 ft w/ RW  Pt presents at PT eval functioning below baseline and currently w/ overall mobility deficits 2* to: BLE weakness, impaired balance, decreased endurance, gait deviations, decreased activity tolerance compared to baseline, decreased functional mobility tolerance compared to baseline, decreased safety awareness, fall risk, decreased cognition  Pt currently at a fall risk 2* to impairments listed above    Pt will continue to benefit from skilled acute PT interventions to address stated impairments; to maximize functional mobility; for ongoing pt/ family training; and DME needs  At conclusion of PT session pt returned back in chair and chair alarm engaged with phone and call bell within reach  Pt denies any further questions at this time  Recommend return to IP rehab upon hospital D/C  PT Discharge Recommendation: Post-Acute Rehabilitation Services     PT - OK to Discharge: Yes(to IP rehab)    See flowsheet documentation for full assessment

## 2021-01-18 LAB
ANION GAP SERPL CALCULATED.3IONS-SCNC: 6 MMOL/L (ref 4–13)
BUN SERPL-MCNC: 29 MG/DL (ref 5–25)
CALCIUM SERPL-MCNC: 9.1 MG/DL (ref 8.3–10.1)
CHLORIDE SERPL-SCNC: 101 MMOL/L (ref 100–108)
CO2 SERPL-SCNC: 27 MMOL/L (ref 21–32)
CREAT SERPL-MCNC: 2.09 MG/DL (ref 0.6–1.3)
ERYTHROCYTE [DISTWIDTH] IN BLOOD BY AUTOMATED COUNT: 14.9 % (ref 11.6–15.1)
GFR SERPL CREATININE-BSD FRML MDRD: 26 ML/MIN/1.73SQ M
GLUCOSE SERPL-MCNC: 105 MG/DL (ref 65–140)
HCT VFR BLD AUTO: 25.9 % (ref 36.5–49.3)
HGB BLD-MCNC: 8.3 G/DL (ref 12–17)
MCH RBC QN AUTO: 30.7 PG (ref 26.8–34.3)
MCHC RBC AUTO-ENTMCNC: 32 G/DL (ref 31.4–37.4)
MCV RBC AUTO: 96 FL (ref 82–98)
PLATELET # BLD AUTO: 470 THOUSANDS/UL (ref 149–390)
PMV BLD AUTO: 9.7 FL (ref 8.9–12.7)
POTASSIUM SERPL-SCNC: 4.4 MMOL/L (ref 3.5–5.3)
RBC # BLD AUTO: 2.7 MILLION/UL (ref 3.88–5.62)
SODIUM SERPL-SCNC: 134 MMOL/L (ref 136–145)
WBC # BLD AUTO: 7.66 THOUSAND/UL (ref 4.31–10.16)

## 2021-01-18 PROCEDURE — 80048 BASIC METABOLIC PNL TOTAL CA: CPT | Performed by: INTERNAL MEDICINE

## 2021-01-18 PROCEDURE — 97167 OT EVAL HIGH COMPLEX 60 MIN: CPT

## 2021-01-18 PROCEDURE — 99232 SBSQ HOSP IP/OBS MODERATE 35: CPT | Performed by: INTERNAL MEDICINE

## 2021-01-18 PROCEDURE — 85027 COMPLETE CBC AUTOMATED: CPT | Performed by: INTERNAL MEDICINE

## 2021-01-18 RX ORDER — SODIUM CHLORIDE 9 MG/ML
50 INJECTION, SOLUTION INTRAVENOUS CONTINUOUS
Status: DISCONTINUED | OUTPATIENT
Start: 2021-01-18 | End: 2021-01-20

## 2021-01-18 RX ADMIN — AMLODIPINE BESYLATE 10 MG: 10 TABLET ORAL at 09:05

## 2021-01-18 RX ADMIN — HEPARIN SODIUM 5000 UNITS: 5000 INJECTION INTRAVENOUS; SUBCUTANEOUS at 05:08

## 2021-01-18 RX ADMIN — Medication 1 CAPSULE: at 17:17

## 2021-01-18 RX ADMIN — FLUTICASONE PROPIONATE 1 SPRAY: 50 SPRAY, METERED NASAL at 09:05

## 2021-01-18 RX ADMIN — LISINOPRIL 10 MG: 10 TABLET ORAL at 09:05

## 2021-01-18 RX ADMIN — GUAIFENESIN 600 MG: 600 TABLET, EXTENDED RELEASE ORAL at 09:05

## 2021-01-18 RX ADMIN — ALUMINUM HYDROXIDE, MAGNESIUM HYDROXIDE, AND SIMETHICONE 30 ML: 200; 200; 20 SUSPENSION ORAL at 00:23

## 2021-01-18 RX ADMIN — OMEGA-3 FATTY ACIDS CAP 1000 MG 1000 MG: 1000 CAP at 09:04

## 2021-01-18 RX ADMIN — Medication 1000 UNITS: at 09:05

## 2021-01-18 RX ADMIN — DOCUSATE SODIUM 100 MG: 100 CAPSULE, LIQUID FILLED ORAL at 17:17

## 2021-01-18 RX ADMIN — PANTOPRAZOLE SODIUM 20 MG: 20 TABLET, DELAYED RELEASE ORAL at 05:08

## 2021-01-18 RX ADMIN — PRAVASTATIN SODIUM 40 MG: 40 TABLET ORAL at 17:17

## 2021-01-18 RX ADMIN — SODIUM CHLORIDE 50 ML/HR: 0.9 INJECTION, SOLUTION INTRAVENOUS at 10:35

## 2021-01-18 RX ADMIN — HYDRALAZINE HYDROCHLORIDE 10 MG: 10 TABLET, FILM COATED ORAL at 09:05

## 2021-01-18 RX ADMIN — Medication 1 TABLET: at 09:05

## 2021-01-18 RX ADMIN — ASPIRIN 81 MG: 81 TABLET, COATED ORAL at 09:04

## 2021-01-18 RX ADMIN — DOCUSATE SODIUM 100 MG: 100 CAPSULE, LIQUID FILLED ORAL at 09:05

## 2021-01-18 NOTE — ASSESSMENT & PLAN NOTE
Lab Results   Component Value Date    EGFR 26 01/18/2021    EGFR 38 01/15/2021    EGFR 34 01/14/2021    CREATININE 2 09 (H) 01/18/2021    CREATININE 1 55 (H) 01/15/2021    CREATININE 1 70 (H) 01/14/2021   · Presenting creatinine of 2 13  · Baseline appears to be around 1 6-1 8  · Improved with IV hydration back to baseline  · Creatinine up to 2 09 again today  · Patient's oral intake has been fluctuating  · Will start gentle IV hydration  Discussed with Nursing -to encourage oral hydration

## 2021-01-18 NOTE — ASSESSMENT & PLAN NOTE
Suspected due to hypovolemia  Lab Results   Component Value Date    SODIUM 134 (L) 01/18/2021    SODIUM 132 (L) 01/15/2021    SODIUM 133 (L) 01/14/2021     Improved with IV fluids  Continue monitor

## 2021-01-18 NOTE — ASSESSMENT & PLAN NOTE
POA secondary to sepsis from pneumonia and acute hyponatremia  Completed IV antibiotics for pneumonia  Received IV fluids for hyponatremia with improvement  Pt still a little confused - ? baseline

## 2021-01-18 NOTE — PLAN OF CARE
Problem: OCCUPATIONAL THERAPY ADULT  Goal: Performs self-care activities at highest level of function for planned discharge setting  See evaluation for individualized goals  Description: Treatment Interventions: ADL retraining, Functional transfer training, Endurance training, Cognitive reorientation, Patient/family training, Equipment evaluation/education, Compensatory technique education, Energy conservation, Activityengagement          See flowsheet documentation for full assessment, interventions and recommendations  Note: Limitation: Decreased ADL status, Decreased Safe judgement during ADL, Decreased cognition, Decreased endurance, Decreased UE strength, Decreased self-care trans, Decreased high-level ADLs, Decreased fine motor control  Prognosis: Fair  Assessment: Pt is a 80 y o  male who was admitted to [facility] on 1/10/2021 with Syncope   Pt's problem list also includes PMH of sepsis due to COVID, carotid artery stenosis, sinus bradycardia, history of squamous cell carcinoma, occipital infarction, basal ganglia stroke, leukocytosis, ambulatory dysfunction, back pain, ARF with hypoxia  At baseline pt was completing assistance with ADL's/IADL's  Pt [facility] at [facility]  Currently pt requires mod a UB, max a LB for overall ADLS and min /mod a x 1 for functional mobility/transfers  Pt currently presents with impairments in the following categories -difficulty performing ADLS, difficulty performing IADLS , limited insight into deficits, compliance, flat affect, decreased initiation and engagement  and health management  activity tolerance, endurance, standing balance/tolerance, sitting balance/tolerance, UE strength, FMC, safety , judgement , attention , coping skills , communication and interpersonal skills   These impairments, as well as pt's fatigue and risk for falls  limit pt's ability to safely engage in all baseline areas of occupation, includingeating, grooming, bathing, dressing, toileting, functional mobility/transfers, community mobility, social participation  and leisure activities  From OT standpoint, recommend STR upon D/C  OT will continue to follow to address the below stated goals        OT Discharge Recommendation: Post-Acute Rehabilitation Services  OT - OK to Discharge: (yes to [phone])

## 2021-01-18 NOTE — PROGRESS NOTES
Progress Note Jaziel Ing 2/16/1927, 80 y o  male MRN: [id number]    [facility]: [id number]    Primary Care Provider: Nan Snyder MD   Date and time admitted to hospital: 1/10/2021  7:46 PM        * Syncope  Assessment & Plan  · Orthostatsis vs dehydration  Does have a hx of an occipital stroke although physical exam is nonfocal  May also be related to possible underlying infection  · Last echo on November 8, 2020 with an ejection fraction of 56%, grade 1 diastolic dysfunction, mild aortic stenosis and mild aortic aneurysmal dilation of 4 0 cm   -Underwent IV fluid hydration with improvement-fluid stopped 1/14/21   -No events on tele- this has been discontinued    Pt's syncope suspected due to dehydration and possible orthostatic hypotension  No events in hospital   After discharge from hospital patient should be well hydrated and make sure his oral intake is appropriate and enough  Outpatient follow-up with his cardiologist     Sepsis due to pneumoia [facility])  Assessment & Plan  · Sepsis POA- Documented fever 100 1° WBC 20 04  · CXR- right upper lung field opacification with increased right-sided interstitial markings in the mid to lower lung field  Left lung field appears clear and much improved compared to prior chest x-ray  · Leukocytosis resolved  · Urine culture with no growth  · Of note he was tested positive for COVID on December 16 and was treated with vitamins, 10 days of Decadron, completed remdesivir, and 7 days of ceftriaxone and discharged on December 28  He was weaned to room air on December 25th    -S/p IV fluids with improvement    -completed antibiotic     -Blood cultures 1/10/21 with no growth x72 hours    - mucinex and nasal saline    Pt stable from resp standpoint    Hyponatremia  Assessment & Plan  Suspected due to hypovolemia    Lab Results   Component Value Date    SODIUM 134 (L) 01/18/2021    SODIUM 132 (L) 01/15/2021    SODIUM 133 (L) 01/14/2021 Improved with IV fluids  Continue monitor  Acute renal failure superimposed on stage 3 chronic kidney disease [facility])  Assessment & Plan  Lab Results   Component Value Date    EGFR 26 01/18/2021    EGFR 38 01/15/2021    EGFR 34 01/14/2021    CREATININE 2 09 (H) 01/18/2021    CREATININE 1 55 (H) 01/15/2021    CREATININE 1 70 (H) 01/14/2021   · Presenting creatinine of 2 13  · Baseline appears to be around 1 6-1 8  · Improved with IV hydration back to baseline  · Creatinine up to 2 09 again today  · Patient's oral intake has been fluctuating  · Will start gentle IV hydration  Discussed with Nursing -to encourage oral hydration  Acute metabolic encephalopathy  Assessment & Plan  POA secondary to sepsis from pneumonia and acute hyponatremia  Completed IV antibiotics for pneumonia  Received IV fluids for hyponatremia with improvement  Pt still a little confused - ? baseline    Anemia  Assessment & Plan  Results from last 7 days   Lab Units 01/18/21  0543 01/15/21  0521 01/14/21  0458 01/13/21  0935 01/13/21  0522   HEMOGLOBIN g/dL 8 3* 8 5* 7 3* 7 8* 6 9*     · With history of underlying CKD, suspect acute component dilutional given recent IV fluids  · Fecal occult blood test negative x1  · No bloody vomit or black or bloody bowel movements  · Hb improving        VTE Pharmacologic Prophylaxis:   Pharmacologic: Heparin  Mechanical VTE Prophylaxis in Place: Yes    Patient Centered Rounds: I have performed bedside rounds with nursing staff today  Discussions with Specialists or Other Care Team Provider:     Education and Discussions with Family / Patient:     Time Spent for Care: 20 minutes  More than 50% of total time spent on counseling and coordination of care as described above      Current Length of Stay: 8 day(s)    Current Patient Status: Inpatient   Certification Statement: The patient will continue to require additional inpatient hospital stay due to above    Discharge Plan: possibly in next 24-48 hrs pending improvement renal function    Code Status: Level 3 - DNAR and DNI      Subjective:   Pt seen and examined by me this morning  Pt said is not feeling well but unable to tell why  Denies any specific complaints  He said he is just tired  When asked him what is bothering him he told me to figure it out myself  Objective:     Vitals:   Temp (24hrs), Av 6 °F (37 °C), Min:98 1 °F (36 7 °C), Max:99 °F (37 2 °C)    Temp:  [98 1 °F (36 7 °C)-99 °F (37 2 °C)] 98 1 °F (36 7 °C)  HR:  [82-98] 98  Resp:  [14-20] 16  BP: (136-150)/(62-63) 136/62  SpO2:  [91 %-97 %] 95 %  Body mass index is 19 45 kg/m²  Input and Output Summary (last 24 hours): Intake/Output Summary (Last 24 hours) at 2021 1404  Last data filed at 2021 0909  Gross per 24 hour   Intake 740 ml   Output    Net 740 ml       Physical Exam:     Physical Exam    Constitutional: Pt appears comfortable  Not in any acute distress  Cardiovascular: Normal rate, regular rhythm, normal heart sounds  No murmur heard  Pulmonary/Chest: Effort normal, air entry b/l equal  No respiratory distress  Pt has no wheezes or crackles  Abdominal: Soft  Non-distended, Non-tender  Bowel sounds are normal    Neurological: awake, alert  Moving all extremities spontaneously  Psychiatric:  Pleasantly confused  No agitation      Additional Data:     Labs:    Results from last 7 days   Lab Units 21  0543   WBC Thousand/uL 7 66   HEMOGLOBIN g/dL 8 3*   HEMATOCRIT % 25 9*   PLATELETS Thousands/uL 470*     Results from last 7 days   Lab Units 21  0543  21  0458   SODIUM mmol/L 134*   < > 133*   POTASSIUM mmol/L 4 4   < > 3 6   CHLORIDE mmol/L 101   < > 104   CO2 mmol/L 27   < > 24   BUN mg/dL 29*   < > 22   CREATININE mg/dL 2 09*   < > 1 70*   ANION GAP mmol/L 6   < > 5   CALCIUM mg/dL 9 1   < > 9 1   ALBUMIN g/dL  --   --  1 7*   TOTAL BILIRUBIN mg/dL  --   --  0 19*   ALK PHOS U/L  --   --  114   ALT U/L  --   --  20   AST U/L  -- --  22   GLUCOSE RANDOM mg/dL 105   < > 84    < > = values in this interval not displayed  Results from last 7 days   Lab Units 01/15/21  0521 01/14/21  0458   PROCALCITONIN ng/ml 0 67* 1 06*           * I Have Reviewed All Lab Data Listed Above  * Additional Pertinent Lab Tests Reviewed:  Lili Estrada Admission Reviewed    Imaging:    Imaging Reports Reviewed Today Include:   Imaging Personally Reviewed by Myself Includes:      Recent Cultures (last 7 days):           Last 24 Hours Medication List:   Current Facility-Administered Medications   Medication Dose Route Frequency Provider Last Rate    acetaminophen  650 mg Oral Q6H PRN Chiki Katz MD      aluminum-magnesium hydroxide-simethicone  30 mL Oral Q6H PRN Zackery Rodriguez MD      amLODIPine  10 mg Oral Daily Zackery Rodriguez MD      aspirin  81 mg Oral Daily Zackery Rodriguez MD      b complex-vitamin C-folic acid  1 capsule Oral Daily With Dinner Zackery Rodriguez MD      cholecalciferol  1,000 Units Oral Daily Zackery Rodriguez MD      docusate sodium  100 mg Oral BID Zackery Rodriguez MD      fish oil  1,000 mg Oral Daily Zackery Rodriguez MD      fluticasone  1 spray Each Nare Daily Zackery Rodriguez MD      guaiFENesin  600 mg Oral Q12H [address] [facility]      heparin (porcine)  5,000 Units Subcutaneous [facility] Zackery Rodriguez MD      hydrALAZINE  10 mg Oral Daily Zackery Rodriguez MD      lisinopril  10 mg Oral Daily Zackery Rodriguez MD      multivitamin-minerals  1 tablet Oral Daily Zackery Rodriguez MD      ondansetron  4 mg Intravenous Q6H PRN Zackery Rodriguez MD      pantoprazole  20 mg Oral Early Morning Zackery Rodriguez MD      pravastatin  40 mg Oral Daily With Marcela Gaucher, MD      sodium chloride (PF)  3 mL Intravenous Q1H PRN Zackery Rodriguez MD      sodium chloride  50 mL/hr Intravenous Continuous Chiki Katz MD 50 mL/hr (01/18/21 1035)        Today, Patient Was Seen By: Pamela Winters MD    ** Please Note: Dictation voice to text software may have been used in the creation of this document   **

## 2021-01-18 NOTE — PLAN OF CARE
Problem: SAFETY ADULT  Goal: Patient will remain free of falls  Description: INTERVENTIONS:  - Assess patient frequently for physical needs  -  Identify cognitive and physical deficits and behaviors that affect risk of falls    -  [place] fall precautions as indicated by assessment   - Educate patient/family on patient safety including physical limitations  - Instruct patient to call for assistance with activity based on assessment  - Modify environment to reduce risk of injury  - Consider OT/PT consult to assist with strengthening/mobility  Outcome: Not Progressing  Goal: Maintain or return to baseline ADL function  Description: INTERVENTIONS:  -  Assess patient's ability to carry out ADLs; assess patient's baseline for ADL function and identify physical deficits which impact ability to perform ADLs (bathing, care of mouth/teeth, toileting, grooming, dressing, etc )  - Assess/evaluate cause of self-care deficits   - Assess range of motion  - Assess patient's mobility; develop plan if impaired  - Assess patient's need for assistive devices and provide as appropriate  - Encourage maximum independence but intervene and supervise when necessary  - Involve family in performance of ADLs  - Assess for home care needs following discharge   - Consider OT consult to assist with ADL evaluation and planning for discharge  - Provide patient education as appropriate  Outcome: Not Progressing  Goal: Maintain or return mobility status to optimal level  Description: INTERVENTIONS:  - Assess patient's baseline mobility status (ambulation, transfers, stairs, etc )    - Identify cognitive and physical deficits and behaviors that affect mobility  - Identify mobility aids required to assist with transfers and/or ambulation (gait belt, sit-to-stand, lift, walker, cane, etc )  - [place] fall precautions as indicated by assessment  - Record patient progress and toleration of activity level on Mobility SBAR; progress patient to next Phase/Stage  - Instruct patient to call for assistance with activity based on assessment  - Consider rehabilitation consult to assist with strengthening/weightbearing, etc   Outcome: Not Progressing     Problem: Knowledge Deficit  Goal: Patient/family/caregiver demonstrates understanding of disease process, treatment plan, medications, and discharge instructions  Description: Complete learning assessment and assess knowledge base  Interventions:  - Provide teaching at level of understanding  - Provide teaching via preferred learning methods  Outcome: Not Progressing     Problem: Potential for Falls  Goal: Patient will remain free of falls  Description: INTERVENTIONS:  - Assess patient frequently for physical needs  -  Identify cognitive and physical deficits and behaviors that affect risk of falls    -  [place] fall precautions as indicated by assessment   - Educate patient/family on patient safety including physical limitations  - Instruct patient to call for assistance with activity based on assessment  - Modify environment to reduce risk of injury  - Consider OT/PT consult to assist with strengthening/mobility  Outcome: Not Progressing     Problem: Prexisting or High Potential for Compromised Skin Integrity  Goal: Skin integrity is maintained or improved  Description: INTERVENTIONS:  - Identify patients at risk for skin breakdown  - Assess and monitor skin integrity  - Assess and monitor nutrition and hydration status  - Monitor labs   - Assess for incontinence   - Turn and reposition patient  - Assist with mobility/ambulation  - Relieve pressure over bony prominences  - Avoid friction and shearing  - Provide appropriate hygiene as needed including keeping skin clean and dry  - Evaluate need for skin moisturizer/barrier cream  - Collaborate with interdisciplinary team   - Patient/family teaching  - Consider wound care consult   Outcome: Not Progressing     Problem: Nutrition/Hydration-ADULT  Goal: Nutrient/Hydration intake appropriate for improving, restoring or maintaining nutritional needs  Description: Monitor and assess patient's nutrition/hydration status for malnutrition  Collaborate with interdisciplinary team and initiate plan and interventions as ordered  Monitor patient's weight and dietary intake as ordered or per policy  Utilize nutrition screening tool and intervene as necessary  Determine patient's food preferences and provide high-protein, high-caloric foods as appropriate       INTERVENTIONS:  - Monitor oral intake, urinary output, labs, and treatment plans  - Assess nutrition and hydration status and recommend course of action  - Evaluate amount of meals eaten  - Assist patient with eating if necessary   - Allow adequate time for meals  - Recommend/ encourage appropriate diets, oral nutritional supplements, and vitamin/mineral supplements  - Order, calculate, and assess calorie counts as needed  - Recommend, monitor, and adjust tube feedings and TPN/PPN based on assessed needs  - Assess need for intravenous fluids  - Provide specific nutrition/hydration education as appropriate  - Include patient/family/caregiver in decisions related to nutrition  Outcome: Not Progressing     Problem: METABOLIC, FLUID AND ELECTROLYTES - ADULT  Goal: Electrolytes maintained within normal limits  Description: INTERVENTIONS:  - Monitor labs and assess patient for signs and symptoms of electrolyte imbalances  - Administer electrolyte replacement as ordered  - Monitor response to electrolyte replacements, including repeat lab results as appropriate  - Instruct patient on fluid and nutrition as appropriate  Outcome: Not Progressing  Goal: Fluid balance maintained  Description: INTERVENTIONS:  - Monitor labs   - Monitor I/O and WT  - Instruct patient on fluid and nutrition as appropriate  - Assess for signs & symptoms of volume excess or deficit  Outcome: Not Progressing     Problem: SKIN/TISSUE INTEGRITY - ADULT  Goal: Skin integrity remains intact  Description: INTERVENTIONS  - Identify patients at risk for skin breakdown  - Assess and monitor skin integrity  - Assess and monitor nutrition and hydration status  - Monitor labs (i e  albumin)  - Assess for incontinence   - Turn and reposition patient  - Assist with mobility/ambulation  - Relieve pressure over bony prominences  - Avoid friction and shearing  - Provide appropriate hygiene as needed including keeping skin clean and dry  - Evaluate need for skin moisturizer/barrier cream  - Collaborate with interdisciplinary team (i e  Nutrition, Rehabilitation, etc )   - Patient/family teaching  Outcome: Not Progressing     Problem: RESPIRATORY - ADULT  Goal: Achieves optimal ventilation and oxygenation  Description: INTERVENTIONS:  - Assess for changes in respiratory status  - Assess for changes in mentation and behavior  - Position to facilitate oxygenation and minimize respiratory effort  - Oxygen administered by appropriate delivery if ordered  - Initiate smoking cessation education as indicated  - Encourage broncho-pulmonary hygiene including cough, deep breathe, Incentive Spirometry  - Assess the need for suctioning and aspirate as needed  - Assess and instruct to report SOB or any respiratory difficulty  - Respiratory Therapy support as indicated  Outcome: Completed     Problem: HEMATOLOGIC - ADULT  Goal: Maintains hematologic stability  Description: INTERVENTIONS  - Assess for signs and symptoms of bleeding or hemorrhage  - Monitor labs  - Administer supportive blood products/factors as ordered and appropriate  Outcome: Completed

## 2021-01-18 NOTE — ASSESSMENT & PLAN NOTE
· Orthostatsis vs dehydration  Does have a hx of an occipital stroke although physical exam is nonfocal  May also be related to possible underlying infection  · Last echo on November 8, 2020 with an ejection fraction of 71%, grade 1 diastolic dysfunction, mild aortic stenosis and mild aortic aneurysmal dilation of 4 0 cm   -Underwent IV fluid hydration with improvement-fluid stopped 1/14/21   -No events on tele- this has been discontinued    Pt's syncope suspected due to dehydration and possible orthostatic hypotension  No events in hospital   After discharge from hospital patient should be well hydrated and make sure his oral intake is appropriate and enough     Outpatient follow-up with his cardiologist

## 2021-01-18 NOTE — ASSESSMENT & PLAN NOTE
Results from last 7 days   Lab Units 01/18/21  0543 01/15/21  0521 01/14/21  0458 01/13/21  0935 01/13/21  0522   HEMOGLOBIN g/dL 8 3* 8 5* 7 3* 7 8* 6 9*     · With history of underlying CKD, suspect acute component dilutional given recent IV fluids    · Fecal occult blood test negative x1  · No bloody vomit or black or bloody bowel movements  · Hb improving

## 2021-01-18 NOTE — OCCUPATIONAL THERAPY NOTE
Occupational Therapy Evaluation     Patient Name: Costa Mcgowan  ZMSXU'C Date: 1/18/2021  Problem List  Principal Problem:    Syncope  Active Problems:    Essential hypertension    HLD (hyperlipidemia)    GERD (gastroesophageal reflux disease)    Acute renal failure superimposed on stage 3 chronic kidney disease (HCC)    Hyponatremia    Sepsis due to pneumoia [facility])    History of stroke    Anemia    Acute metabolic encephalopathy    Past Medical History  Past Medical History:   Diagnosis Date    HANNA [facility] kidney [facility]) ([facility] [address] ) 6/20/2017    Arthritis     Cataracts, both eyes     HCAP (healthcare-associated pneumonia) 10/24/2019    Hyperlipidemia     Hypertension     Problems with hearing     Severe sepsis ([facility] [address] ) 6/20/2017    Stroke ([facility] )     Syncope 10/23/2019     Past Surgical History  Past Surgical History:   Procedure Laterality Date    CAROTID ARTERY ANGIOPLASTY      CATARACT EXTRACTION             01/18/21 1130   OT Last Visit   OT Visit Date 01/18/21   Note Type   Note type Evaluation   Restrictions/Precautions   Weight Bearing Precautions Per Order No   Other Precautions Cognitive; Chair Alarm; Bed Alarm; Fall Risk;Pain   Pain Assessment   Pain Assessment Tool Pain Assessment not indicated - pt denies pain   Pain Score No Pain   Home Living   Type of Home SNF  [facility])   Home Layout One level   Bathroom Shower/Tub Walk-in shower   Bathroom Toilet Raised   Bathroom Equipment Grab bars in shower; Shower chair;Grab bars around toilet   Bathroom Accessibility Accessible   Additional Comments pt admitted from [place] [address] was living in [address] and is now in the processing of transititioning to personal care for increased assistance  Prior Function   Level of [place] Needs assistance with IADLs; Needs assistance with ADLs and functional mobility   Lives With Facility staff   Receives Help From Family;Personal care attendant   ADL Assistance Needs assistance   IADLs Needs assistance   Vocational Retired   Lifestyle   Autonomy Assistance with ADL's/IADL's, +RW with functional ambulation,(continue to clarify) (-) drives   Reciprocal Relationships facilty, daughters (2)   Service to Others retired   Psychosocial   Psychosocial (WDL) X   Patient Behaviors/Mood Flat affect; Uncooperative;Irritable-Nsg aware   Subjective   Subjective "I want to lay in this bed and not get up"   ADL   Eating Assistance 5  Supervision/Setup   Eating Deficit Setup   Grooming Assistance 4  Minimal Assistance   UB Bathing Assistance 4  Minimal Assistance   LB Bathing Assistance 3  Moderate Assistance   [address] 2  Maximal Assistance   LB Dressing Deficit Don/doff R sock; Don/doff L sock   Toileting Assistance  3  Moderate Assistance   Bed Mobility   Supine to Sit 3  Moderate assistance   Additional items Assist x 1;Verbal cues  (trunk management, tactile cues to advance B/L feet to EOB)   Sit to Supine 5  Supervision   Transfers   Sit to Stand 4  Minimal assistance   Additional items Assist x 1;Verbal cues   Stand to Sit 4  Minimal assistance   Additional items Assist x 1   Additional Comments pt performed sit to stand transfers approx 50% to readjust sitting position, refused OOB mobility/transfers   Balance   Static Sitting Fair   Dynamic Sitting Poor +   Activity Tolerance   Activity Tolerance Patient limited by fatigue   Nurse Made Aware RN cleared pt for therapy   RUE Assessment   RUE Assessment WFL  (with transfers)   LUE Assessment   LUE Assessment WFL  (wtih transfers)   Hand Function   Gross Motor Coordination Functional   Fine Motor Coordination Impaired  (assistance opening items on lunch tray (small packages, drinks)   Vision-Basic Assessment   Current Vision Wears glasses all the time   Cognition   Overall Cognitive Status Impaired   Arousal/Participation Alert; Responsive   Attention Difficulty attending to directions   Orientation Level Oriented to person;Oriented to place;Oriented to time   Memory Decreased recall of precautions;Decreased recall of recent events;Decreased short term memory   Following Commands Follows one step commands inconsistently   Comments Pt [place], poor historian, forgetful (unable to state facility resides) lethargic with cues to maintain eyes open-oriented to self, grossly to place "hospital" with choice cues for name of place, grossly to date Jan/21" pt equires encouragement for participation with OOB mobility and engagement in self care tasks  , verbal cues to sequence transfers with one step directives  Assessment   Limitation Decreased ADL status; Decreased Safe judgement during ADL;Decreased cognition;Decreased endurance;Decreased UE strength;Decreased self-care trans;Decreased high-level ADLs; Decreased fine motor control   Prognosis Fair   Assessment Pt is a 80 y o  male who was admitted to [facility] on 1/10/2021 with Syncope, HANNA, right middle lobe PNA   Pt's problem list also includes PMH of sepsis due to COVID, carotid artery stenosis, sinus bradycardia, history of squamous cell carcinoma, occipital infarction, basal ganglia stroke, leukocytosis, ambulatory dysfunction, back pain, ARF with hypoxia  At baseline pt was completing assistance with ADL's/IADL's  Pt lives at [facility]  Currently pt requires mod a UB, max a LB for overall ADLS and min /mod a x 1 for functional mobility/transfers  Pt currently presents with impairments in the following categories -difficulty performing ADLS, difficulty performing IADLS , limited insight into deficits, compliance, flat affect, decreased initiation and engagement  and health management  activity tolerance, endurance, standing balance/tolerance, sitting balance/tolerance, UE strength, FMC, safety , judgement , attention , coping skills , communication and interpersonal skills   These impairments, as well as pt's fatigue and risk for falls  limit pt's ability to safely engage in all baseline areas of occupation, includingeating, grooming, bathing, dressing, toileting, functional mobility/transfers, community mobility, social participation  and leisure activities  From OT standpoint, recommend STR upon D/C  OT will continue to follow to address the below stated goals  Goals   Patient Goals sleep   LTG Time Frame 10-14   Long Term Goal #1 see goals below   Plan   Treatment Interventions ADL retraining;Functional transfer training; Endurance training;Cognitive reorientation;Patient/family training;Equipment evaluation/education; Compensatory technique education; Energy conservation; Activityengagement   Goal Expiration Date 02/01/21   OT Frequency 3-5x/wk   Recommendation   OT Discharge Recommendation Post-Acute Rehabilitation Services   OT - OK to Discharge   (yes to STR)   Barthel Index   Feeding 5   Bathing 0   Grooming Score 0   Dressing Score 5   Bladder Score 5   Bowels Score 10   Toilet Use Score 5   Transfers (Bed/Chair) Score 10   Mobility (Level Surface) Score 5   Stairs Score 0   Barthel Index Score 45   Modified [place] Scale   Modified [place] Scale 4      Occupational Therapy Goals:    *Mod I with bed mobility to engage in functional tasks    *Mod I UB, min a LB  Adl's after setup with use of AE PRN  *S toileting and clothing management   *S transfers to/from all surfaces with Fair + dynamic balance and safety for participation in dynamic adls and iadl tasks   *Demonstrate good carryover with safe use of RW during functional tasks   *Increase activity tolerance to 25-30 minutes for participation in adls and enjoyable activities  *Pt to participate in further cognitive testing with good attention and participation to assist with safe d/c recommendations  *Pt will follow 100% simple one step verbal commands and be A/Ox4 consistently t/o use of external environmental cues w/ mod I  *Demonstrate good carryover of pt/family education and training with good tolerance for increased safety and independence with ADL's/ADl's  *OTR to assess functional mobility and determine goals when appropriate    Harpreet Celis MOT, OTR/L

## 2021-01-19 PROBLEM — E87.1 HYPONATREMIA: Status: RESOLVED | Noted: 2020-11-16 | Resolved: 2021-01-19

## 2021-01-19 LAB
ANION GAP SERPL CALCULATED.3IONS-SCNC: 4 MMOL/L (ref 4–13)
BUN SERPL-MCNC: 28 MG/DL (ref 5–25)
CALCIUM SERPL-MCNC: 9.2 MG/DL (ref 8.3–10.1)
CHLORIDE SERPL-SCNC: 104 MMOL/L (ref 100–108)
CO2 SERPL-SCNC: 28 MMOL/L (ref 21–32)
CREAT SERPL-MCNC: 1.94 MG/DL (ref 0.6–1.3)
GFR SERPL CREATININE-BSD FRML MDRD: 29 ML/MIN/1.73SQ M
GLUCOSE SERPL-MCNC: 90 MG/DL (ref 65–140)
POTASSIUM SERPL-SCNC: 4.3 MMOL/L (ref 3.5–5.3)
SODIUM SERPL-SCNC: 136 MMOL/L (ref 136–145)

## 2021-01-19 PROCEDURE — 99232 SBSQ HOSP IP/OBS MODERATE 35: CPT | Performed by: INTERNAL MEDICINE

## 2021-01-19 PROCEDURE — 97110 THERAPEUTIC EXERCISES: CPT

## 2021-01-19 PROCEDURE — 97116 GAIT TRAINING THERAPY: CPT

## 2021-01-19 PROCEDURE — 80048 BASIC METABOLIC PNL TOTAL CA: CPT | Performed by: INTERNAL MEDICINE

## 2021-01-19 RX ORDER — ECHINACEA PURPUREA EXTRACT 125 MG
1 TABLET ORAL
Status: DISCONTINUED | OUTPATIENT
Start: 2021-01-19 | End: 2021-01-20 | Stop reason: HOSPADM

## 2021-01-19 RX ORDER — ECHINACEA PURPUREA EXTRACT 125 MG
1 TABLET ORAL EVERY 4 HOURS
Status: DISCONTINUED | OUTPATIENT
Start: 2021-01-19 | End: 2021-01-20 | Stop reason: HOSPADM

## 2021-01-19 RX ORDER — LANOLIN ALCOHOL/MO/W.PET/CERES
3 CREAM (GRAM) TOPICAL
Status: DISCONTINUED | OUTPATIENT
Start: 2021-01-19 | End: 2021-01-20 | Stop reason: HOSPADM

## 2021-01-19 RX ORDER — ECHINACEA PURPUREA EXTRACT 125 MG
1 TABLET ORAL
Status: DISCONTINUED | OUTPATIENT
Start: 2021-01-19 | End: 2021-01-19

## 2021-01-19 RX ADMIN — Medication 1000 UNITS: at 08:41

## 2021-01-19 RX ADMIN — Medication 1 SPRAY: at 21:09

## 2021-01-19 RX ADMIN — SODIUM CHLORIDE 50 ML/HR: 0.9 INJECTION, SOLUTION INTRAVENOUS at 06:45

## 2021-01-19 RX ADMIN — OMEGA-3 FATTY ACIDS CAP 1000 MG 1000 MG: 1000 CAP at 08:41

## 2021-01-19 RX ADMIN — DOCUSATE SODIUM 100 MG: 100 CAPSULE, LIQUID FILLED ORAL at 17:00

## 2021-01-19 RX ADMIN — ASPIRIN 81 MG: 81 TABLET, COATED ORAL at 08:41

## 2021-01-19 RX ADMIN — Medication 1 SPRAY: at 01:37

## 2021-01-19 RX ADMIN — LISINOPRIL 10 MG: 10 TABLET ORAL at 08:41

## 2021-01-19 RX ADMIN — MELATONIN 3 MG: at 21:08

## 2021-01-19 RX ADMIN — Medication 1 SPRAY: at 17:00

## 2021-01-19 RX ADMIN — FLUTICASONE PROPIONATE 1 SPRAY: 50 SPRAY, METERED NASAL at 08:42

## 2021-01-19 RX ADMIN — Medication 1 SPRAY: at 13:33

## 2021-01-19 RX ADMIN — GUAIFENESIN 600 MG: 600 TABLET, EXTENDED RELEASE ORAL at 21:08

## 2021-01-19 RX ADMIN — Medication 1 CAPSULE: at 17:00

## 2021-01-19 RX ADMIN — AMLODIPINE BESYLATE 10 MG: 10 TABLET ORAL at 08:41

## 2021-01-19 RX ADMIN — Medication 1 TABLET: at 08:41

## 2021-01-19 RX ADMIN — DOCUSATE SODIUM 100 MG: 100 CAPSULE, LIQUID FILLED ORAL at 08:41

## 2021-01-19 RX ADMIN — GUAIFENESIN 600 MG: 600 TABLET, EXTENDED RELEASE ORAL at 08:41

## 2021-01-19 RX ADMIN — ALUMINUM HYDROXIDE, MAGNESIUM HYDROXIDE, AND SIMETHICONE 30 ML: 200; 200; 20 SUSPENSION ORAL at 20:48

## 2021-01-19 RX ADMIN — PRAVASTATIN SODIUM 40 MG: 40 TABLET ORAL at 17:00

## 2021-01-19 RX ADMIN — HYDRALAZINE HYDROCHLORIDE 10 MG: 10 TABLET, FILM COATED ORAL at 08:41

## 2021-01-19 NOTE — ASSESSMENT & PLAN NOTE
Lab Results   Component Value Date    EGFR 29 01/19/2021    EGFR 26 01/18/2021    EGFR 38 01/15/2021    CREATININE 1 94 (H) 01/19/2021    CREATININE 2 09 (H) 01/18/2021    CREATININE 1 55 (H) 01/15/2021   · Presenting creatinine of 2 13  · Baseline appears to be around 1 6-1 8  · Improved with IV hydration back to baseline  · Creatinine still up above baseline  · Patient's oral intake has been fluctuating  · Will start gentle IV hydration    Appetite improved now

## 2021-01-19 NOTE — CASE MANAGEMENT
Pt could be d/c tomorrow if lab work is stable  CM attempted to call Fabian Roach [phone] and Isai Yeboah [phone]  CM was unable to reach either daughter today will try again tomorrow      CM submitted to [facility] for d/c tomorrow for prior authorization

## 2021-01-19 NOTE — PROGRESS NOTES
Progress Note - Vito Castellano 2/16/1927, 80 y o  male MRN: [id number]    Unit/Bed#: [facility] 826-01 Encounter: [id number]    Primary Care Provider: Wale Mcguire MD   Date and time admitted to hospital: 1/10/2021  7:46 PM        Acute metabolic encephalopathy  Assessment & Plan  · POA secondary to sepsis from pneumonia and acute hyponatremia  · Completed IV antibiotics for pneumonia  · Received IV fluids for hyponatremia with improvement  · Pt still a little confused, likely at baseline    Anemia  Assessment & Plan  Results from last 7 days   Lab Units 01/18/21  0543 01/15/21  0521 01/14/21  0458 01/13/21  0935 01/13/21  0522   HEMOGLOBIN g/dL 8 3* 8 5* 7 3* 7 8* 6 9*     · With history of underlying CKD, suspect acute component dilutional given recent IV fluids  · Fecal occult blood test negative x1  · No bloody vomit or black or bloody bowel movements  · Hb improving    History of stroke  Assessment & Plan  · History of right occipital stroke  · No focal deficits   · Also has a history of a carotid endarterectomy  · Continue aspirin and statin    Sepsis due to pneumoia [facility])  Assessment & Plan  · Sepsis POA- Documented fever 100 1° WBC 20 04  · CXR- right upper lung field opacification with increased right-sided interstitial markings in the mid to lower lung field  Left lung field appears clear and much improved compared to prior chest x-ray  · Leukocytosis resolved  · Urine culture with no growth  · Of note he was tested positive for COVID on December 16 and was treated with vitamins, 10 days of Decadron, completed remdesivir, and 7 days of ceftriaxone and discharged on December 28   He was weaned to room air on December 25th    -S/p IV fluids with improvement    -completed antibiotic     -Blood cultures 1/10/21 with no growth x72 hours    - mucinex and nasal saline    Pt stable from resp standpoint    Acute renal failure superimposed on stage 3 chronic kidney disease [facility])  Assessment & Plan  Lab Results Component Value Date    EGFR 29 01/19/2021    EGFR 26 01/18/2021    EGFR 38 01/15/2021    CREATININE 1 94 (H) 01/19/2021    CREATININE 2 09 (H) 01/18/2021    CREATININE 1 55 (H) 01/15/2021   · Presenting creatinine of 2 13  · Baseline appears to be around 1 6-1 8  · Improved with IV hydration back to baseline  · Creatinine still up above baseline  · Patient's oral intake has been fluctuating  · Will start gentle IV hydration  Appetite improved now    GERD (gastroesophageal reflux disease)  Assessment & Plan  · Continue omeprazole  HLD (hyperlipidemia)  Assessment & Plan  · Currently on Pravachol 40 mg daily  Essential hypertension  Assessment & Plan  · On lisinopril 10 mg daily, hydralazine 10 mg daily, amlodipine 10 mg daily    * Syncope  Assessment & Plan  · Orthostatsis vs dehydration  Does have a hx of an occipital stroke although physical exam is nonfocal  May also be related to possible underlying infection  · Last echo on November 8, 2020 with an ejection fraction of 40%, grade 1 diastolic dysfunction, mild aortic stenosis and mild aortic aneurysmal dilation of 4 0 cm   -Underwent IV fluid hydration with improvement-fluid stopped 1/14/21   -No events on tele- this has been discontinued    Pt's syncope suspected due to dehydration and possible orthostatic hypotension  No events in hospital   After discharge from hospital patient should be well hydrated and make sure his oral intake is appropriate and enough  Outpatient follow-up with his cardiologist         VTE Pharmacologic Prophylaxis:   Pharmacologic: Heparin  Mechanical VTE Prophylaxis in Place: Yes    Patient Centered Rounds: I have performed bedside rounds with nursing staff today  Education and Discussions with Family / Patient: daughter, plan of care    Time Spent for Care: 20 minutes  More than 50% of total time spent on counseling and coordination of care as described above      Current Length of Stay: 9 day(s)    Current Patient Status: Inpatient   Certification Statement: The patient will continue to require additional inpatient hospital stay due to elevated creatinine, continue Iv hydration    Discharge Plan: [facility], likely tomorrow    Code Status: Level 3 - DNAR and DNI      Subjective:   Denies any new complaints  Has mild cough, denies SOB    Objective:     Vitals:   Temp (24hrs), Av 8 °F (36 6 °C), Min:96 9 °F (36 1 °C), Max:98 3 °F (36 8 °C)    Temp:  [96 9 °F (36 1 °C)-98 3 °F (36 8 °C)] 98 1 °F (36 7 °C)  HR:  [82-88] 88  Resp:  [18] 18  BP: (128-161)/(66-71) 128/66  SpO2:  [92 %-97 %] 97 %  Body mass index is 20 44 kg/m²  Input and Output Summary (last 24 hours): Intake/Output Summary (Last 24 hours) at 2021 1635  Last data filed at 2021 1430  Gross per 24 hour   Intake 1900 ml   Output 1625 ml   Net 275 ml       Physical Exam:     Physical Exam  Constitutional:       Appearance: Normal appearance  HENT:      Head: Normocephalic  Mouth/Throat:      Mouth: Mucous membranes are moist       Pharynx: Oropharynx is clear  Eyes:      Extraocular Movements: Extraocular movements intact  Cardiovascular:      Rate and Rhythm: Normal rate and regular rhythm  Pulmonary:      Effort: Pulmonary effort is normal       Breath sounds: No wheezing  Abdominal:      General: Abdomen is flat  Palpations: Abdomen is soft  Skin:     General: Skin is warm and dry  Neurological:      General: No focal deficit present  Mental Status: He is alert and oriented to person, place, and time     Psychiatric:         Mood and Affect: Mood normal          Behavior: Behavior normal            Additional Data:     Labs:    Results from last 7 days   Lab Units 21  0543   WBC Thousand/uL 7 66   HEMOGLOBIN g/dL 8 3*   HEMATOCRIT % 25 9*   PLATELETS Thousands/uL 470*     Results from last 7 days   Lab Units 21  0517  21  0458   SODIUM mmol/L 136   < > 133*   POTASSIUM mmol/L 4 3   < > 3 6 CHLORIDE mmol/L 104   < > 104   CO2 mmol/L 28   < > 24   BUN mg/dL 28*   < > 22   CREATININE mg/dL 1 94*   < > 1 70*   ANION GAP mmol/L 4   < > 5   CALCIUM mg/dL 9 2   < > 9 1   ALBUMIN g/dL  --   --  1 7*   TOTAL BILIRUBIN mg/dL  --   --  0 19*   ALK PHOS U/L  --   --  114   ALT U/L  --   --  20   AST U/L  --   --  22   GLUCOSE RANDOM mg/dL 90   < > 84    < > = values in this interval not displayed  Results from last 7 days   Lab Units 01/15/21  0521 01/14/21  0458   PROCALCITONIN ng/ml 0 67* 1 06*           * I Have Reviewed All Lab Data Listed Above  * Additional Pertinent Lab Tests Reviewed:  All Labs Within Last 24 Hours Reviewed    Recent Cultures (last 7 days):           Last 24 Hours Medication List:   Current Facility-Administered Medications   Medication Dose Route Frequency Provider Last Rate    acetaminophen  650 mg Oral Q6H PRN Jones Sofia MD      aluminum-magnesium hydroxide-simethicone  30 mL Oral Q6H PRN Gerhardt Norton, MD      amLODIPine  10 mg Oral Daily Gerhardt Norton, MD      aspirin  81 mg Oral Daily Gerhardt Norton, MD      b complex-vitamin C-folic acid  1 capsule Oral Daily With Alejandro Sheridan MD      cholecalciferol  1,000 Units Oral Daily Gerhardt Norton, MD      docusate sodium  100 mg Oral BID Gerhardt Norton, MD      fish oil  1,000 mg Oral Daily Gerhardt Norton, MD      fluticasone  1 spray Each Nare Daily Gerhardt Norton, MD      guaiFENesin  600 mg Oral Q12H [address] [place], [place]-      heparin (porcine)  5,000 Units Subcutaneous [facility] Gerhardt Norton, MD      hydrALAZINE  10 mg Oral Daily Gerhardt Norton, MD      lisinopril  10 mg Oral Daily Gerhardt Norton, MD      melatonin  3 mg Oral HS Jenny Magana MD      multivitamin-minerals  1 tablet Oral Daily Gerhardt Norton, MD      ondansetron  4 mg Intravenous Q6H PRN Gerhardt Norton, MD      pantoprazole  20 mg Oral Early Morning Gerhardt Norton, MD      pravastatin  40 mg Oral Daily With Lester Henderson MD      sodium chloride  1 spray Each Nare Q3H PRN Aubrie Richards PA-C      sodium chloride  1 spray Each Nare Q4H Adalgisa Jara MD      sodium chloride (PF)  3 mL Intravenous Q1H PRN Alvaro Ellison MD      sodium chloride  50 mL/hr Intravenous Continuous Adalgisa Jara MD 50 mL/hr (01/19/21 0645)        Today, Patient Was Seen By: Oliver Flores MD    ** Please Note: Dictation voice to text software may have been used in the creation of this document   **

## 2021-01-19 NOTE — ASSESSMENT & PLAN NOTE
· POA secondary to sepsis from pneumonia and acute hyponatremia  · Completed IV antibiotics for pneumonia  · Received IV fluids for hyponatremia with improvement  · Pt still a little confused, likely at baseline

## 2021-01-19 NOTE — ASSESSMENT & PLAN NOTE
· Orthostatsis vs dehydration  Does have a hx of an occipital stroke although physical exam is nonfocal  May also be related to possible underlying infection  · Last echo on November 8, 2020 with an ejection fraction of 78%, grade 1 diastolic dysfunction, mild aortic stenosis and mild aortic aneurysmal dilation of 4 0 cm   -Underwent IV fluid hydration with improvement-fluid stopped 1/14/21   -No events on tele- this has been discontinued    Pt's syncope suspected due to dehydration and possible orthostatic hypotension  No events in hospital   After discharge from hospital patient should be well hydrated and make sure his oral intake is appropriate and enough     Outpatient follow-up with his cardiologist

## 2021-01-19 NOTE — PHYSICAL THERAPY NOTE
01/19/21 1058   PT Last Visit   PT Visit Date 01/19/21   Note Type   Note Type Treatment   Pain Assessment   Pain Assessment Tool 0-10   Pain Score No Pain   Restrictions/Precautions   Weight Bearing Precautions Per Order No   Other Precautions Cognitive; Chair Alarm; Bed Alarm;Telemetry; Fall Risk;Hard of hearing   General   Family/Caregiver Present No   Cognition   Overall Cognitive Status Impaired   Arousal/Participation Alert; Cooperative   Attention Attends with cues to redirect   Orientation Level Disoriented to situation   Memory Decreased recall of recent events;Decreased recall of precautions   Following Commands Follows one step commands with increased time or repetition   Comments pt needed some encouragment to get started with PT tx today   Subjective   Subjective pt reluctant but agreeable to participate in PT tx today  Bed Mobility   Supine to Sit 4  Minimal assistance   Additional items Assist x 1;Verbal cues   Transfers   Sit to Stand 4  Minimal assistance   Additional items Assist x 1;Verbal cues;Armrests; Increased time required   Stand to Sit 4  Minimal assistance   Additional items Assist x 1; Armrests; Verbal cues   Toilet transfer 3  Moderate assistance   Additional items Assist x 1;Standard toilet  (secondary low height of toilet)   Ambulation/Elevation   Gait pattern Improper Weight shift;Decreased foot clearance;Narrow NNAMDI; Short stride; Excessively slow   Gait Assistance 4  Minimal assist   Additional items Assist x 1   Assistive Device Rolling walker   Distance 20'x1, 12'x1   Balance   Static Sitting Fair +   Dynamic Sitting Poor +   Static Standing Fair -   Dynamic Standing Poor +   Ambulatory Fair -   Endurance Deficit   Endurance Deficit Yes   Endurance Deficit Description fatigue   Activity Tolerance   Activity Tolerance Patient tolerated treatment well;Patient limited by fatigue   Nurse Made Aware yes   Exercises   TKR Supine;Sitting;10 reps;AROM; Bilateral   Assessment   Prognosis Good Problem List Decreased strength;Decreased endurance; Impaired balance;Decreased mobility; Decreased coordination;Decreased safety awareness   Assessment Pt tolerated tx well today  He was able to perform all transfers with less assistance than previous tx sessions  He was able to progress his endurance with gait training to 20'x1 with RW and min assist x 1  Pt performed all exercises as directed today  Pt would benefit from rehab to maximize his functional mobility and safety  Continue with current POC while in acute setting  Goals   Patient Goals to walk   STG Expiration Date 01/31/21   PT Treatment Day 1   Plan   Treatment/Interventions Functional transfer training;LE strengthening/ROM; Therapeutic exercise; Endurance training;Cognitive reorientation;Patient/family training;Equipment eval/education; Bed mobility;Gait training;Spoke to nursing   Progress Slow progress, decreased activity tolerance   PT Frequency   (3-5x/week)   Recommendation   PT Discharge Recommendation Post-Acute Rehabilitation Services   Equipment Recommended   (RW)   PT - OK to Discharge Yes  (to rehab once medically cleared )   Magaly Martinez, PTA

## 2021-01-19 NOTE — PLAN OF CARE
Problem: SAFETY ADULT  Goal: Patient will remain free of falls  Description: INTERVENTIONS:  - Assess patient frequently for physical needs  -  Identify cognitive and physical deficits and behaviors that affect risk of falls    -  [place] fall precautions as indicated by assessment   - Educate patient/family on patient safety including physical limitations  - Instruct patient to call for assistance with activity based on assessment  - Modify environment to reduce risk of injury  - Consider OT/PT consult to assist with strengthening/mobility  Outcome: Progressing  Goal: Maintain or return to baseline ADL function  Description: INTERVENTIONS:  -  Assess patient's ability to carry out ADLs; assess patient's baseline for ADL function and identify physical deficits which impact ability to perform ADLs (bathing, care of mouth/teeth, toileting, grooming, dressing, etc )  - Assess/evaluate cause of self-care deficits   - Assess range of motion  - Assess patient's mobility; develop plan if impaired  - Assess patient's need for assistive devices and provide as appropriate  - Encourage maximum independence but intervene and supervise when necessary  - Involve family in performance of ADLs  - Assess for home care needs following discharge   - Consider OT consult to assist with ADL evaluation and planning for discharge  - Provide patient education as appropriate  Outcome: Progressing  Goal: Maintain or return mobility status to optimal level  Description: INTERVENTIONS:  - Assess patient's baseline mobility status (ambulation, transfers, stairs, etc )    - Identify cognitive and physical deficits and behaviors that affect mobility  - Identify mobility aids required to assist with transfers and/or ambulation (gait belt, sit-to-stand, lift, walker, cane, etc )  - [place] fall precautions as indicated by assessment  - Record patient progress and toleration of activity level on Mobility SBAR; progress patient to next Phase/Stage  - Instruct patient to call for assistance with activity based on assessment  - Consider rehabilitation consult to assist with strengthening/weightbearing, etc   Outcome: Progressing     Problem: Knowledge Deficit  Goal: Patient/family/caregiver demonstrates understanding of disease process, treatment plan, medications, and discharge instructions  Description: Complete learning assessment and assess knowledge base  Interventions:  - Provide teaching at level of understanding  - Provide teaching via preferred learning methods  Outcome: Progressing     Problem: Potential for Falls  Goal: Patient will remain free of falls  Description: INTERVENTIONS:  - Assess patient frequently for physical needs  -  Identify cognitive and physical deficits and behaviors that affect risk of falls    -  [place] fall precautions as indicated by assessment   - Educate patient/family on patient safety including physical limitations  - Instruct patient to call for assistance with activity based on assessment  - Modify environment to reduce risk of injury  - Consider OT/PT consult to assist with strengthening/mobility  Outcome: Progressing     Problem: Prexisting or High Potential for Compromised Skin Integrity  Goal: Skin integrity is maintained or improved  Description: INTERVENTIONS:  - Identify patients at risk for skin breakdown  - Assess and monitor skin integrity  - Assess and monitor nutrition and hydration status  - Monitor labs   - Assess for incontinence   - Turn and reposition patient  - Assist with mobility/ambulation  - Relieve pressure over bony prominences  - Avoid friction and shearing  - Provide appropriate hygiene as needed including keeping skin clean and dry  - Evaluate need for skin moisturizer/barrier cream  - Collaborate with interdisciplinary team   - Patient/family teaching  - Consider wound care consult   Outcome: Progressing     Problem: Nutrition/Hydration-ADULT  Goal: Nutrient/Hydration intake appropriate for improving, restoring or maintaining nutritional needs  Description: Monitor and assess patient's nutrition/hydration status for malnutrition  Collaborate with interdisciplinary team and initiate plan and interventions as ordered  Monitor patient's weight and dietary intake as ordered or per policy  Utilize nutrition screening tool and intervene as necessary  Determine patient's food preferences and provide high-protein, high-caloric foods as appropriate       INTERVENTIONS:  - Monitor oral intake, urinary output, labs, and treatment plans  - Assess nutrition and hydration status and recommend course of action  - Evaluate amount of meals eaten  - Assist patient with eating if necessary   - Allow adequate time for meals  - Recommend/ encourage appropriate diets, oral nutritional supplements, and vitamin/mineral supplements  - Order, calculate, and assess calorie counts as needed  - Recommend, monitor, and adjust tube feedings and TPN/PPN based on assessed needs  - Assess need for intravenous fluids  - Provide specific nutrition/hydration education as appropriate  - Include patient/family/caregiver in decisions related to nutrition  Outcome: Progressing     Problem: METABOLIC, FLUID AND ELECTROLYTES - ADULT  Goal: Electrolytes maintained within normal limits  Description: INTERVENTIONS:  - Monitor labs and assess patient for signs and symptoms of electrolyte imbalances  - Administer electrolyte replacement as ordered  - Monitor response to electrolyte replacements, including repeat lab results as appropriate  - Instruct patient on fluid and nutrition as appropriate  Outcome: Progressing  Goal: Fluid balance maintained  Description: INTERVENTIONS:  - Monitor labs   - Monitor I/O and WT  - Instruct patient on fluid and nutrition as appropriate  - Assess for signs & symptoms of volume excess or deficit  Outcome: Progressing     Problem: SKIN/TISSUE INTEGRITY - ADULT  Goal: Skin integrity remains intact  Description: INTERVENTIONS  - Identify patients at risk for skin breakdown  - Assess and monitor skin integrity  - Assess and monitor nutrition and hydration status  - Monitor labs (i e  albumin)  - Assess for incontinence   - Turn and reposition patient  - Assist with mobility/ambulation  - Relieve pressure over bony prominences  - Avoid friction and shearing  - Provide appropriate hygiene as needed including keeping skin clean and dry  - Evaluate need for skin moisturizer/barrier cream  - Collaborate with interdisciplinary team (i e  Nutrition, Rehabilitation, etc )   - Patient/family teaching  Outcome: Progressing

## 2021-01-19 NOTE — PLAN OF CARE
Problem: PHYSICAL THERAPY ADULT  Goal: Performs mobility at highest level of function for planned discharge setting  See evaluation for individualized goals  Description: Treatment/Interventions: Functional transfer training, LE strengthening/ROM, Therapeutic exercise, Endurance training, Cognitive reorientation, Patient/family training, Equipment eval/education, Bed mobility, Gait training          See flowsheet documentation for full assessment, interventions and recommendations  Outcome: Progressing  Note: Prognosis: Good  Problem List: Decreased strength, Decreased endurance, Impaired balance, Decreased mobility, Decreased coordination, Decreased safety awareness  Assessment: Pt tolerated tx well today  He was able to perform all transfers with less assistance than previous tx sessions  He was able to progress his endurance with gait training to 20'x1 with RW and min assist x 1  Pt performed all exercises as directed today  Pt would benefit from rehab to maximize his functional mobility and safety  Continue with current POC while in acute setting  PT Discharge Recommendation: Post-Acute Rehabilitation Services     PT - OK to Discharge: Yes(to rehab once medically cleared )    See flowsheet documentation for full assessment

## 2021-01-20 ENCOUNTER — TELEPHONE (OUTPATIENT)
Dept: OTHER | Facility: OTHER | Age: 86
End: 2021-01-20

## 2021-01-20 VITALS
SYSTOLIC BLOOD PRESSURE: 132 MMHG | OXYGEN SATURATION: 94 % | BODY MASS INDEX: 19.7 KG/M2 | DIASTOLIC BLOOD PRESSURE: 66 MMHG | TEMPERATURE: 98.4 F | WEIGHT: 130 LBS | HEART RATE: 80 BPM | RESPIRATION RATE: 16 BRPM | HEIGHT: 68 IN

## 2021-01-20 PROBLEM — A41.9 SEPSIS (HCC): Status: RESOLVED | Noted: 2021-01-10 | Resolved: 2021-01-20

## 2021-01-20 PROBLEM — G93.41 ACUTE METABOLIC ENCEPHALOPATHY: Status: RESOLVED | Noted: 2021-01-14 | Resolved: 2021-01-20

## 2021-01-20 LAB
ANION GAP SERPL CALCULATED.3IONS-SCNC: 4 MMOL/L (ref 4–13)
BUN SERPL-MCNC: 29 MG/DL (ref 5–25)
CALCIUM SERPL-MCNC: 8.9 MG/DL (ref 8.3–10.1)
CHLORIDE SERPL-SCNC: 103 MMOL/L (ref 100–108)
CO2 SERPL-SCNC: 27 MMOL/L (ref 21–32)
CREAT SERPL-MCNC: 1.79 MG/DL (ref 0.6–1.3)
ERYTHROCYTE [DISTWIDTH] IN BLOOD BY AUTOMATED COUNT: 14.9 % (ref 11.6–15.1)
FLUAV RNA RESP QL NAA+PROBE: NEGATIVE
FLUBV RNA RESP QL NAA+PROBE: NEGATIVE
GFR SERPL CREATININE-BSD FRML MDRD: 32 ML/MIN/1.73SQ M
GLUCOSE SERPL-MCNC: 93 MG/DL (ref 65–140)
HCT VFR BLD AUTO: 27.5 % (ref 36.5–49.3)
HGB BLD-MCNC: 8.5 G/DL (ref 12–17)
MCH RBC QN AUTO: 30.1 PG (ref 26.8–34.3)
MCHC RBC AUTO-ENTMCNC: 30.9 G/DL (ref 31.4–37.4)
MCV RBC AUTO: 98 FL (ref 82–98)
PLATELET # BLD AUTO: 514 THOUSANDS/UL (ref 149–390)
PMV BLD AUTO: 9.3 FL (ref 8.9–12.7)
POTASSIUM SERPL-SCNC: 4.7 MMOL/L (ref 3.5–5.3)
RBC # BLD AUTO: 2.82 MILLION/UL (ref 3.88–5.62)
RSV RNA RESP QL NAA+PROBE: NEGATIVE
SARS-COV-2 RNA RESP QL NAA+PROBE: NEGATIVE
SODIUM SERPL-SCNC: 134 MMOL/L (ref 136–145)
WBC # BLD AUTO: 9.99 THOUSAND/UL (ref 4.31–10.16)

## 2021-01-20 PROCEDURE — 0241U HB NFCT DS VIR RESP RNA 4 TRGT: CPT | Performed by: INTERNAL MEDICINE

## 2021-01-20 PROCEDURE — 80048 BASIC METABOLIC PNL TOTAL CA: CPT | Performed by: INTERNAL MEDICINE

## 2021-01-20 PROCEDURE — 99239 HOSP IP/OBS DSCHRG MGMT >30: CPT | Performed by: INTERNAL MEDICINE

## 2021-01-20 PROCEDURE — 97110 THERAPEUTIC EXERCISES: CPT

## 2021-01-20 PROCEDURE — 85027 COMPLETE CBC AUTOMATED: CPT | Performed by: INTERNAL MEDICINE

## 2021-01-20 PROCEDURE — 97116 GAIT TRAINING THERAPY: CPT

## 2021-01-20 RX ORDER — LANOLIN ALCOHOL/MO/W.PET/CERES
3 CREAM (GRAM) TOPICAL
Refills: 0
Start: 2021-01-20 | End: 2021-03-06 | Stop reason: ALTCHOICE

## 2021-01-20 RX ORDER — DOCUSATE SODIUM 100 MG/1
100 CAPSULE, LIQUID FILLED ORAL 2 TIMES DAILY
Qty: 10 CAPSULE | Refills: 0 | Status: SHIPPED | OUTPATIENT
Start: 2021-01-20 | End: 2021-03-06 | Stop reason: ALTCHOICE

## 2021-01-20 RX ADMIN — Medication 1 SPRAY: at 08:35

## 2021-01-20 RX ADMIN — Medication 1 TABLET: at 08:32

## 2021-01-20 RX ADMIN — PRAVASTATIN SODIUM 40 MG: 40 TABLET ORAL at 17:26

## 2021-01-20 RX ADMIN — GUAIFENESIN 600 MG: 600 TABLET, EXTENDED RELEASE ORAL at 08:32

## 2021-01-20 RX ADMIN — Medication 1000 UNITS: at 08:32

## 2021-01-20 RX ADMIN — PANTOPRAZOLE SODIUM 20 MG: 20 TABLET, DELAYED RELEASE ORAL at 05:34

## 2021-01-20 RX ADMIN — HYDRALAZINE HYDROCHLORIDE 10 MG: 10 TABLET, FILM COATED ORAL at 08:32

## 2021-01-20 RX ADMIN — Medication 1 CAPSULE: at 17:26

## 2021-01-20 RX ADMIN — DOCUSATE SODIUM 100 MG: 100 CAPSULE, LIQUID FILLED ORAL at 17:26

## 2021-01-20 RX ADMIN — SODIUM CHLORIDE 50 ML/HR: 0.9 INJECTION, SOLUTION INTRAVENOUS at 00:34

## 2021-01-20 RX ADMIN — Medication 1 SPRAY: at 05:34

## 2021-01-20 RX ADMIN — OMEGA-3 FATTY ACIDS CAP 1000 MG 1000 MG: 1000 CAP at 08:32

## 2021-01-20 RX ADMIN — FLUTICASONE PROPIONATE 1 SPRAY: 50 SPRAY, METERED NASAL at 08:34

## 2021-01-20 RX ADMIN — ASPIRIN 81 MG: 81 TABLET, COATED ORAL at 08:32

## 2021-01-20 RX ADMIN — AMLODIPINE BESYLATE 10 MG: 10 TABLET ORAL at 08:32

## 2021-01-20 RX ADMIN — Medication 1 SPRAY: at 17:26

## 2021-01-20 RX ADMIN — DOCUSATE SODIUM 100 MG: 100 CAPSULE, LIQUID FILLED ORAL at 08:34

## 2021-01-20 RX ADMIN — LISINOPRIL 10 MG: 10 TABLET ORAL at 08:32

## 2021-01-20 NOTE — CASE MANAGEMENT
Called from Kamlesh Prasad at [address] called there with the [facility] information  Auth # [id number]  Cm arranged 2030 transport to [facility] with [facility]  CM notified Daughter Gerline Aid who was agreeable to d/c

## 2021-01-20 NOTE — PLAN OF CARE
Problem: PHYSICAL THERAPY ADULT  Goal: Performs mobility at highest level of function for planned discharge setting  See evaluation for individualized goals  Description: Treatment/Interventions: Functional transfer training, LE strengthening/ROM, Therapeutic exercise, Endurance training, Cognitive reorientation, Patient/family training, Equipment eval/education, Bed mobility, Gait training          See flowsheet documentation for full assessment, interventions and recommendations  Outcome: Progressing  Note: Prognosis: Good  Problem List: Decreased strength, Decreased endurance, Impaired balance, Decreased mobility, Decreased safety awareness, Impaired hearing  Assessment: Pt tolerated treatment well today and was limited by fatigue only  He required min A during supine to sit transfer for his upper body and was able to scoot forward to EOB with S  Upon standing, patient reported slight lightheadedness, which did not limit his progress with therapy  He ambulated 48' with a RW, min Ax1, and a chair follow, requiring VCs for walker management  Patient took a seated rest break for 2 min secondary to fatigue and lightheadedness, during which SPO2 was assessed to be 94  He then walked another 48' with RW, min Ax1, and a chair follow back to his room, requiring additional VCs for walker management  Once seated in chair, he was willing to perform some therapeutic exercises including LAQ and Marches  He required VCs to perform exercises in a controlled manner  At conclusion of session, pt remained in chair with feet elevated, chair alarm, phone, call bell, and all other personal needs within reach  Throughout the treatment pt was corporative and pleasant, engaging in some conversation  Additionally, he had poor safety awareness and requires encouragement for participation in therapy  At conclusion, he requested rest due to fatigue  Current POC should be continued in acute setting          PT Discharge Recommendation: Post-Acute Rehabilitation Services     PT - OK to Discharge: Yes(to rehab once medically cleared)    See flowsheet documentation for full assessment

## 2021-01-20 NOTE — TRANSPORTATION MEDICAL NECESSITY
Section I - General Information    Name of Patient: Domi Mantilla                 : 1927    Medicare #: [id number]  Transport Date: 21 (PCS is valid for round trips on this date and for all repetitive trips in the 60-day range as noted below )  Origin: [facility] [address]                                                         Destination: [address]  Is the pt's stay covered under Medicare Part A (PPS/DRG)   [x]     Closest appropriate facility? If no, why is transport to more distant facility required? Yes  If hospice pt, is this transport related to pt's terminal illness? NA       Section II - Medical Necessity Questionnaire  Ambulance transportation is medically necessary only if other means of transport are contraindicated or would be potentially harmful to the patient  To meet this requirement, the patient must either be "bed confined" or suffer from a condition such that transport by means other than ambulance is contraindicated by the patient's condition  The following questions must be answered by the medical professional signing below for this form to be valid:    1)  Describe the MEDICAL CONDITION (physical and/or mental) of this patient AT [address] [place] [address] that requires the patient to be transported in an ambulance and why transport by other means is contraindicated by the patient's condition: confused, falls risk needs an attendant    2) Is the patient "bed confined" as defined below? No  To be "be confined" the patient must satisfy all three of the following conditions: (1) unable to get up from bed without Assistance; AND (2) unable to ambulate; AND (3) unable to sit in a chair or wheelchair  3) Can this patient safely be transported by car or wheelchair van (i e , seated during transport without a medical attendant or monitoring)?    No    4) In addition to completing questions 1-3 above, please check any of the following conditions that apply*:   *Note: supporting documentation for any boxes checked must be maintained in the patient's medical records  If hosp-hosp transfer, describe services needed at 2nd facility not available at 1st facility? Patient is confused  Danger to self/others  Medical attendant required       Section III - Signature of Physician or Healthcare Professional  I certify that the above information is true and correct based on my evaluation of this patient, and represent that the patient requires transport by ambulance and that other forms of transport are contraindicated  I understand that this information will be used by the Centers for Medicare and Medicaid Services (CMS) to support the determination of medical necessity for ambulance services, and I represent that I have personal knowledge of the patient's condition at time of transport  []  If this box is checked, I also certify that the patient is physically or mentally incapable of signing the ambulance service's claim and that the institution with which I am affiliated has furnished care, services, or assistance to the patient  My signature below is made on behalf of the patient pursuant to 42 CFR §424 36(b)(4)  In accordance with 42 CFR §424 37, the specific reason(s) that the patient is physically or mentally incapable of signing the claim form is as follows: Theodor Dessert of Physician* or Healthcare Professional______________________________________________________________  Signature Date 01/20/21 (For scheduled repetitive transports, this form is not valid for transports performed more than 60 days after this date)    Printed Name & Credentials of Physician or Healthcare Professional (MD, DO, RN, etc )_____Orin Zarco RN  ___________________________  *Form must be signed by patient's attending physician for scheduled, repetitive transports   For non-repetitive, unscheduled ambulance transports, if unable to obtain the signature of the attending physician, any of the following may sign (choose appropriate option below)  [] Physician Assistant []  Clinical Nurse Specialist [x]  Registered Nurse  []  Nurse Practitioner  [x] Discharge Planner

## 2021-01-20 NOTE — DISCHARGE SUMMARY
Transition of Care Discharge Summary - [facility]    Patient Information: Madai Peterson 80 y o  male MRN: [id number]  [facility] Encounter: [id number]    Discharging Physician / Practitioner: Santiago Wright MD  PCP: Dominique Bean MD  Admission Date: 1/10/2021  Discharge Date: 01/20/21    Disposition:      Short Term Rehab or SNF at [facility]    For Discharges to [facility] SNF:   · [facility] Texted SNF Physician    Reason for Admission: syncope    Discharge Diagnoses:     Principal Problem:    Syncope  Active Problems:    Essential hypertension    HLD (hyperlipidemia)    GERD (gastroesophageal reflux disease)    Acute renal failure superimposed on stage 3 chronic kidney disease ([facility] [address] )    History of stroke    Anemia  Resolved Problems:    Hyponatremia    Sepsis due to [facility])    Acute metabolic encephalopathy      Consultations During Hospital Stay:  · None    Procedures Performed:     · None    Medication Adjustments and Discharge Medications:  · Summary of Medication Adjustments made as a result of this hospitalization: see medication list  · Medication Dosing Tapers - Please refer to Discharge Medication List for details on any medication dosing tapers (if applicable to patient)  · Medications being temporarily held (include recommended restart time): None  · Discharge Medication List: See after visit summary for reconciled discharge medications  Wound Care Recommendations:  When applicable, please see wound care section of After Visit Summary  Diet Recommendations at Discharge:  Diet -        Diet Orders   (From admission, onward)             Start     Ordered    01/19/21 1035  Diet Regular; Regular House; Dysphagia 2-Mechanical Soft;  Thin Liquid  Diet effective now     Question Answer Comment   Diet Type Regular    Regular Regular House    Other Restriction(s): Dysphagia 2-Mechanical Soft    Liquid Modifier Thin Liquid    RD to adjust diet per protocol? Yes    Special Instructions Soft Vegetables    Special Instructions Chopped meats        01/19/21 1034    01/19/21 1034  Dietary nutrition supplements  Once     Question Answer Comment   Select Supplement: Ensure Enlive-Chocolate    Frequency Breakfast, Lunch, Dinner        01/19/21 1033              Fluid Restriction - No Fluid Restriction at Discharge  Instructions for any Catheters / Lines Present at Discharge (including removal date, if applicable): None    Significant Findings / Test Results:     · chest xray: right peripheral infiltrate, improved left lung infiltrate    Incidental Findings:   · None    Test Results Pending at Discharge (will require follow up): · None     Outpatient Tests Requested:  · BMP in 3 days    Complications:  None    Hospital Course:     Madai Peterson is a 80 y o  male patient who originally presented to the hospital on 1/10/2021 due to syncope  He was admitted and found to have acute kidney injury and orthostatic hypotension  A chest xray showed a new pneumonia in the right lung  He was give IV fluids and antibiotics  His symptoms improved  Bps stabilized and renal function returned to near baseline  He was discharged to inpatient rehabilitation  Condition at Discharge: stable     Discharge Day Visit / Exam:     Subjective:  Denies any new complaints today  Vitals: Blood Pressure: 132/66 (01/20/21 1541)  Pulse: 80 (01/20/21 0742)  Temperature: 98 4 °F (36 9 °C) (01/20/21 1541)  Temp Source: Oral (01/16/21 1458)  Respirations: 16 (01/20/21 1541)  Height: 5' 8" (172 7 cm) (01/11/21 1943)  Weight - Scale: 59 kg (130 lb) (01/20/21 0553)  SpO2: 94 % (01/20/21 0742)  Exam:   Physical Exam  Constitutional:       Appearance: Normal appearance  HENT:      Head: Normocephalic and atraumatic  Mouth/Throat:      Mouth: Mucous membranes are moist       Pharynx: Oropharynx is clear  Eyes:      Extraocular Movements: Extraocular movements intact  Conjunctiva/sclera: Conjunctivae normal    Cardiovascular:      Rate and Rhythm: Normal rate and regular rhythm  Pulmonary:      Breath sounds: No wheezing or rales  Neurological:      Mental Status: He is alert and oriented to person, place, and time  Mental status is at baseline  Psychiatric:         Mood and Affect: Mood normal          Behavior: Behavior normal          Goals of Care Discussions:  · Code Status at Discharge: Level 3 - DNAR and DNI  · Were there any Goals of Care Discussions during Hospitalization?: Yes  · Results of any General Goals of Care Discussions: DNR level 3   · POLST Completed: No   · If POLST Completed, Summary of POLST Agreement Provided Here: N/A   · OK to Rehospitalize if Needed? Yes    Discharge instructions/Information to patient and family:   See after visit summary section titled Discharge Instructions for information provided to patient and family  Planned Readmission: No      Discharge Statement:  I spent 40 minutes discharging the patient  This time was spent on the day of discharge  I had direct contact with the patient on the day of discharge  Greater than 50% of the total time was spent examining patient, answering all patient questions, arranging and discussing plan of care with patient as well as directly providing post-discharge instructions  Additional time then spent on discharge activities      ** Please Note: This note has been constructed using a voice recognition system **

## 2021-01-20 NOTE — PHYSICAL THERAPY NOTE
PHYSICAL THERAPY NOTE          Patient Name: Leonie Parra  EAMHP'M Date: 1/20/2021 01/20/21 1134   PT Last Visit   PT Visit Date 01/20/21   Note Type   Note Type Treatment   Pain Assessment   Pain Assessment Tool Pain Assessment not indicated - pt denies pain   Pain Score No Pain   Cognition   Overall Cognitive Status Impaired   Arousal/Participation Alert; Cooperative   Attention Attends with cues to redirect   Orientation Level Oriented X4   Memory Within functional limits   Following Commands Follows one step commands with increased time or repetition   Subjective   Subjective pt was tired from lack of sleep but willing to participate in PT today   Bed Mobility   Sit to Supine 4  Minimal assistance   Additional items Assist x 1; Increased time required;Verbal cues  (VC to push off bed; hand held assist)   Transfers   Sit to Stand 4  Minimal assistance   Additional items Assist x 1; Increased time required   Stand to Sit 4  Minimal assistance   Additional items Assist x 1; Increased time required;Verbal cues  (VC for hand placement and positioning )   Ambulation/Elevation   Gait pattern Improper Weight shift;Narrow NNAMDI; Decreased foot clearance; Short stride; Excessively slow; Foward flexed   Gait Assistance 4  Minimal assist   Additional items Assist x 1;Verbal cues  (VC for RW management)   Assistive Device Rolling walker   Distance 50'x2 with seated rest break b/w   Balance   Static Sitting Fair   Dynamic Sitting Fair -   Static Standing Fair -   Dynamic Standing Poor +   Ambulatory Fair -   Endurance Deficit   Endurance Deficit Yes   Endurance Deficit Description fatigue   Activity Tolerance   Activity Tolerance Patient tolerated treatment well;Patient limited by fatigue   Nurse Made Aware yes   Exercises   Knee AROM Long Arc Quad 5 reps; Sitting;Bilateral   Marching 5 reps; Sitting;Bilateral   Assessment   Prognosis Good   Problem List Decreased strength;Decreased endurance; Impaired balance;Decreased mobility; Decreased safety awareness; Impaired hearing   Assessment Pt tolerated treatment well today and was limited by fatigue only  He required min A during supine to sit transfer for his upper body and was able to scoot forward to EOB with S  Upon standing, patient reported slight lightheadedness, which did not limit his progress with therapy  He ambulated 48' with a RW, min Ax1, and a chair follow, requiring VCs for walker management  Patient took a seated rest break for 2 min secondary to fatigue and lightheadedness, during which SPO2 was assessed to be 94  He then walked another 48' with RW, min Ax1, and a chair follow back to his room, requiring additional VCs for walker management  Once seated in chair, he was willing to perform some therapeutic exercises including LAQ and Marches  He required VCs to perform exercises in a controlled manner  At conclusion of session, pt remained in chair with feet elevated, chair alarm, phone, call bell, and all other personal needs within reach  Throughout the treatment pt was corporative and pleasant, engaging in some conversation  Additionally, he had poor safety awareness and requires encouragement for participation in therapy  At conclusion, he requested rest due to fatigue  Current POC should be continued in acute setting  Goals   Patient Goals to rest   STG Expiration Date 01/31/21   PT Treatment Day 2   Plan   Treatment/Interventions Functional transfer training;LE strengthening/ROM; Therapeutic exercise; Endurance training;Cognitive reorientation;Patient/family training;Equipment eval/education; Bed mobility;Gait training;Spoke to nursing   Progress Slow progress, decreased activity tolerance   PT Frequency   (3-5x/wk)   Recommendation   PT Discharge Recommendation Post-Acute Rehabilitation Services   PT - OK to Discharge Yes  (to rehab once medically cleared)   Vlad Mohamud,SPT

## 2021-01-21 ENCOUNTER — NURSING HOME VISIT (OUTPATIENT)
Dept: GERIATRICS | Facility: OTHER | Age: 86
End: 2021-01-21
Payer: COMMERCIAL

## 2021-01-21 ENCOUNTER — TRANSITIONAL CARE MANAGEMENT (OUTPATIENT)
Dept: FAMILY MEDICINE CLINIC | Facility: CLINIC | Age: 86
End: 2021-01-21

## 2021-01-21 VITALS
BODY MASS INDEX: 20.18 KG/M2 | TEMPERATURE: 97.7 F | WEIGHT: 132.7 LBS | DIASTOLIC BLOOD PRESSURE: 56 MMHG | SYSTOLIC BLOOD PRESSURE: 140 MMHG | RESPIRATION RATE: 20 BRPM | HEART RATE: 89 BPM | OXYGEN SATURATION: 94 %

## 2021-01-21 DIAGNOSIS — R41.89 COGNITIVE DECLINE: ICD-10-CM

## 2021-01-21 DIAGNOSIS — U07.1 COVID-19 VIRUS INFECTION: ICD-10-CM

## 2021-01-21 DIAGNOSIS — I10 ESSENTIAL HYPERTENSION: Chronic | ICD-10-CM

## 2021-01-21 DIAGNOSIS — D64.9 ANEMIA, UNSPECIFIED TYPE: ICD-10-CM

## 2021-01-21 DIAGNOSIS — H35.3131 EARLY DRY STAGE NONEXUDATIVE AGE-RELATED MACULAR DEGENERATION OF BOTH EYES: ICD-10-CM

## 2021-01-21 DIAGNOSIS — Z71.89 GOALS OF CARE, COUNSELING/DISCUSSION: ICD-10-CM

## 2021-01-21 DIAGNOSIS — N18.32 ACUTE RENAL FAILURE SUPERIMPOSED ON STAGE 3B CHRONIC KIDNEY DISEASE, UNSPECIFIED ACUTE RENAL FAILURE TYPE (HCC): ICD-10-CM

## 2021-01-21 DIAGNOSIS — R55 SYNCOPE, UNSPECIFIED SYNCOPE TYPE: Primary | Chronic | ICD-10-CM

## 2021-01-21 DIAGNOSIS — N17.9 ACUTE RENAL FAILURE SUPERIMPOSED ON STAGE 3B CHRONIC KIDNEY DISEASE, UNSPECIFIED ACUTE RENAL FAILURE TYPE (HCC): ICD-10-CM

## 2021-01-21 DIAGNOSIS — E87.1 HYPONATREMIA: ICD-10-CM

## 2021-01-21 DIAGNOSIS — K21.9 GASTROESOPHAGEAL REFLUX DISEASE WITHOUT ESOPHAGITIS: ICD-10-CM

## 2021-01-21 DIAGNOSIS — J18.9 PNEUMONIA OF RIGHT UPPER LOBE DUE TO INFECTIOUS ORGANISM: ICD-10-CM

## 2021-01-21 DIAGNOSIS — H90.3 SENSORINEURAL HEARING LOSS (SNHL) OF BOTH EARS: ICD-10-CM

## 2021-01-21 PROBLEM — H35.3130 BILATERAL NONEXUDATIVE AGE-RELATED MACULAR DEGENERATION: Status: ACTIVE | Noted: 2021-01-21

## 2021-01-21 PROCEDURE — 99305 1ST NF CARE MODERATE MDM 35: CPT | Performed by: INTERNAL MEDICINE

## 2021-01-21 NOTE — ASSESSMENT & PLAN NOTE
Lab Results   Component Value Date    EGFR 32 01/20/2021    EGFR 29 01/19/2021    EGFR 26 01/18/2021    CREATININE 1 79 (H) 01/20/2021    CREATININE 1 94 (H) 01/19/2021    CREATININE 2 09 (H) 01/18/2021   Patient developed acute on chronic renal failure patient's baseline creatinine is approximately 156 with a GFR the ones in the low 30s  He has evidence of chronic renal failure stage IIIB

## 2021-01-21 NOTE — ASSESSMENT & PLAN NOTE
Patient with a history of an infiltrate in the right upper lobe on recent admission receive antibiotic therapy

## 2021-01-21 NOTE — PATIENT INSTRUCTIONS
1 -with order serum iron and total iron binding capacity  2 -will need [facility] evaluation will baseline    3  -physical and occupational therapy to increase his strength so he can return to personal care  4 -maintain option of hospice once he goes to personal care so he can remain in the facility should he have any further problems

## 2021-01-21 NOTE — ASSESSMENT & PLAN NOTE
Patient with long history of hypertension currently on hydralazine 10 mg  every 8 hours, amlodipine 10 mg orally daily, lisinopril 5 mg a day

## 2021-01-21 NOTE — ASSESSMENT & PLAN NOTE
Patient had history of syncope on present admission most likely secondary to dehydration  and antihypertensive therapy

## 2021-01-21 NOTE — PLAN OF CARE
Problem: SAFETY ADULT  Goal: Patient will remain free of falls  Description: INTERVENTIONS:  - Assess patient frequently for physical needs  -  Identify cognitive and physical deficits and behaviors that affect risk of falls    -  [place] fall precautions as indicated by assessment   - Educate patient/family on patient safety including physical limitations  - Instruct patient to call for assistance with activity based on assessment  - Modify environment to reduce risk of injury  - Consider OT/PT consult to assist with strengthening/mobility  Outcome: Completed  Goal: Maintain or return to baseline ADL function  Description: INTERVENTIONS:  -  Assess patient's ability to carry out ADLs; assess patient's baseline for ADL function and identify physical deficits which impact ability to perform ADLs (bathing, care of mouth/teeth, toileting, grooming, dressing, etc )  - Assess/evaluate cause of self-care deficits   - Assess range of motion  - Assess patient's mobility; develop plan if impaired  - Assess patient's need for assistive devices and provide as appropriate  - Encourage maximum independence but intervene and supervise when necessary  - Involve family in performance of ADLs  - Assess for home care needs following discharge   - Consider OT consult to assist with ADL evaluation and planning for discharge  - Provide patient education as appropriate  Outcome: Completed  Goal: Maintain or return mobility status to optimal level  Description: INTERVENTIONS:  - Assess patient's baseline mobility status (ambulation, transfers, stairs, etc )    - Identify cognitive and physical deficits and behaviors that affect mobility  - Identify mobility aids required to assist with transfers and/or ambulation (gait belt, sit-to-stand, lift, walker, cane, etc )  - [place] fall precautions as indicated by assessment  - Record patient progress and toleration of activity level on Mobility SBAR; progress patient to next Phase/Stage  - Instruct patient to call for assistance with activity based on assessment  - Consider rehabilitation consult to assist with strengthening/weightbearing, etc   Outcome: Completed     Problem: Knowledge Deficit  Goal: Patient/family/caregiver demonstrates understanding of disease process, treatment plan, medications, and discharge instructions  Description: Complete learning assessment and assess knowledge base  Interventions:  - Provide teaching at level of understanding  - Provide teaching via preferred learning methods  Outcome: Completed     Problem: Potential for Falls  Goal: Patient will remain free of falls  Description: INTERVENTIONS:  - Assess patient frequently for physical needs  -  Identify cognitive and physical deficits and behaviors that affect risk of falls    -  [place] fall precautions as indicated by assessment   - Educate patient/family on patient safety including physical limitations  - Instruct patient to call for assistance with activity based on assessment  - Modify environment to reduce risk of injury  - Consider OT/PT consult to assist with strengthening/mobility  Outcome: Completed     Problem: Prexisting or High Potential for Compromised Skin Integrity  Goal: Skin integrity is maintained or improved  Description: INTERVENTIONS:  - Identify patients at risk for skin breakdown  - Assess and monitor skin integrity  - Assess and monitor nutrition and hydration status  - Monitor labs   - Assess for incontinence   - Turn and reposition patient  - Assist with mobility/ambulation  - Relieve pressure over bony prominences  - Avoid friction and shearing  - Provide appropriate hygiene as needed including keeping skin clean and dry  - Evaluate need for skin moisturizer/barrier cream  - Collaborate with interdisciplinary team   - Patient/family teaching  - Consider wound care consult   Outcome: Completed     Problem: Nutrition/Hydration-ADULT  Goal: Nutrient/Hydration intake appropriate for improving, restoring or maintaining nutritional needs  Description: Monitor and assess patient's nutrition/hydration status for malnutrition  Collaborate with interdisciplinary team and initiate plan and interventions as ordered  Monitor patient's weight and dietary intake as ordered or per policy  Utilize nutrition screening tool and intervene as necessary  Determine patient's food preferences and provide high-protein, high-caloric foods as appropriate       INTERVENTIONS:  - Monitor oral intake, urinary output, labs, and treatment plans  - Assess nutrition and hydration status and recommend course of action  - Evaluate amount of meals eaten  - Assist patient with eating if necessary   - Allow adequate time for meals  - Recommend/ encourage appropriate diets, oral nutritional supplements, and vitamin/mineral supplements  - Order, calculate, and assess calorie counts as needed  - Recommend, monitor, and adjust tube feedings and TPN/PPN based on assessed needs  - Assess need for intravenous fluids  - Provide specific nutrition/hydration education as appropriate  - Include patient/family/caregiver in decisions related to nutrition  Outcome: Completed     Problem: METABOLIC, FLUID AND ELECTROLYTES - ADULT  Goal: Electrolytes maintained within normal limits  Description: INTERVENTIONS:  - Monitor labs and assess patient for signs and symptoms of electrolyte imbalances  - Administer electrolyte replacement as ordered  - Monitor response to electrolyte replacements, including repeat lab results as appropriate  - Instruct patient on fluid and nutrition as appropriate  Outcome: Completed  Goal: Fluid balance maintained  Description: INTERVENTIONS:  - Monitor labs   - Monitor I/O and WT  - Instruct patient on fluid and nutrition as appropriate  - Assess for signs & symptoms of volume excess or deficit  Outcome: Completed     Problem: SKIN/TISSUE INTEGRITY - ADULT  Goal: Skin integrity remains intact  Description: INTERVENTIONS  - Identify patients at risk for skin breakdown  - Assess and monitor skin integrity  - Assess and monitor nutrition and hydration status  - Monitor labs (i e  albumin)  - Assess for incontinence   - Turn and reposition patient  - Assist with mobility/ambulation  - Relieve pressure over bony prominences  - Avoid friction and shearing  - Provide appropriate hygiene as needed including keeping skin clean and dry  - Evaluate need for skin moisturizer/barrier cream  - Collaborate with interdisciplinary team (i e  Nutrition, Rehabilitation, etc )   - Patient/family teaching  Outcome: Completed

## 2021-01-21 NOTE — TELEPHONE ENCOUNTER
[phone]/[id number] [facility]/PTNicole Chaudhary/GERA 1927/Regarding review on patient's medication, thank you

## 2021-01-21 NOTE — PROGRESS NOTES
[facility] History and Physical 31    NAME: Huan Pantoja  AGE: 80 y o  SEX: male [id number]    DATE OF ENCOUNTER: 1/21/2021    Assessment and Plan     Problem List Items Addressed This Visit        Digestive    GERD (gastroesophageal reflux disease)     Patient currently on omeprazole 20 mg orally daily  Respiratory    Pneumonia of right upper lobe due to infectious organism     Patient with a history of an infiltrate in the right upper lobe on recent admission receive antibiotic therapy  Cardiovascular and Mediastinum    Essential hypertension (Chronic)     Patient with long history of hypertension currently on hydralazine 10 mg  every 8 hours, amlodipine 10 mg orally daily, lisinopril 5 mg a day  Syncope - Primary (Chronic)     Patient had history of syncope on present admission most likely secondary to dehydration  and antihypertensive therapy            Nervous and Auditory    Sensorineural hearing loss (SNHL) of both ears     Patient states that he has hearing aids but they are of little help  Genitourinary    Acute renal failure superimposed on stage 3 chronic kidney disease ([facility] [address] )     Lab Results   Component Value Date    EGFR 32 01/20/2021    EGFR 29 01/19/2021    EGFR 26 01/18/2021    CREATININE 1 79 (H) 01/20/2021    CREATININE 1 94 (H) 01/19/2021    CREATININE 2 09 (H) 01/18/2021   Patient developed acute on chronic renal failure patient's baseline creatinine is approximately 156 with a GFR the ones in the low 30s  He has evidence of chronic renal failure stage IIIB  Other    COVID-19 virus infection     Patient developed COVID on December 17th he has had cognitive decline since event           Goals of care, counseling/discussion     1) what matters most for patient -I spoke with patient's daughter who is his power of  very goal is for him to go to personal care should he get stronger with current therapy  The patient is DNR DNI  Patient's power of  is open for him to go on hospice once he finishes therapy  She would like to have his care done in this facility  She would prefer that the patient did not return to the hospital     2 -memory  -patient has had decline in his memory he will need to have a Hindman test done the get a baseline  3 -mobility  The patient requires use of a walker to ambulate  Patient has had falls in the past according to the daughter  4 -medications  -patient currently on antihypertensive medications which can increase risk of orthostasis  Anemia     Patient with history of anemia of chronic disease baseline hemoglobin around 8 5 with a hematocrit 27 5  I do not see serum iron levels or tibc done in the past   He does have ferritin levels done stitched and they were elevated but ferritin is an acute phase reactant  so any time one has   an inflammatory response ferritin to be elevated  Cognitive decline     Patient will need a Hindman evaluation to get a baseline  Bilateral nonexudative age-related macular degeneration     Patient states that he has history of dry macular degeneration  All medications and routine orders were reviewed and updated as needed  Plan discussed with: Patient    Chief Complaint   Patient feels weak  No chief complaint on file  History of Present Illness     Patient is a 77-year-old  male who moved in [facility] back in September 2017, he had lived independently prior to that date  Family noticed a decline , in his hygiene, taking medications andcognition  Patient was moved to the skilled care facility the patient came to the skilled care area in November of 2020  Patient developed COVID 23 on December 17th of 2020  Patient was able to ambulate with a rolling walker,  On January 10th the patient developed low-grade fever, syncope and acute on chronic renal failure    He was referred to the hospital where he was placed on IV antibiotics Rocephin and was given IV fluids to help improve his renal status  The patient was noted to be anemic secondary to chronic disease  The patient had 10 days of hospital therapy initial x-ray had shown evidence of pneumonia in the right upper lobe  Patient's blood cultures were negative  He did have  mild hyponatremia with sodiums in the 134 range  Patient has been transferred back to the skilled care facility with long-term goal for the patient to be discharged to personal care  Patient is DNR DNI daughter is open to the option of hospice care once he finishes his therapy  She does not really want him to go back and forth to the hospital regularly as  he has done in the recent past   Patient's other comorbidities include hypertension, chronic renal failure stage 3b, anemia of chronic disease, cognitive decline, hearing loss  Patient also relates a history of dry macular degeneration  HISTORY:  Past Surgical History:   Procedure Laterality Date    CAROTID ARTERY ANGIOPLASTY      CATARACT EXTRACTION        Past Medical History:   Diagnosis Date    [facility] 6/20/2017    Arthritis     Cataracts, both eyes     HCAP (healthcare-associated pneumonia) 10/24/2019    Hyperlipidemia     Hypertension     Problems with hearing     Severe sepsis ([facility] ) 6/20/2017    Stroke ([facility] )     Syncope 10/23/2019     Family History   Problem Relation Age of Onset    Hypertension Mother     Hypertension Daughter      Social History     Socioeconomic History    Marital status:       Spouse name: None    Number of children: None    Years of education: None    Highest education level: None   Occupational History    None   Social Needs    Financial resource strain: None    Food insecurity     Worry: None     Inability: None    Transportation needs     Medical: None     Non-medical: None   Tobacco Use    Smoking status: Never Smoker    Smokeless tobacco: Never Used   Substance and Sexual Activity    Alcohol use: Never     Frequency: Never     Binge frequency: Never    Drug use: No    Sexual activity: Not Currently   Lifestyle    Physical activity     Days per week: None     Minutes per session: None    Stress: None   Relationships    Social connections     Talks on phone: None     Gets together: None     Attends Roman Catholic service: None     Active member of club or organization: None     Attends meetings of clubs or organizations: None     Relationship status: None    Intimate partner violence     Fear of current or ex partner: None     Emotionally abused: None     Physically abused: None     Forced sexual activity: None   Other Topics Concern    None   Social History Narrative    [place]       Allergies:  No Known Allergies    Review of Systems     Review of Systems   Constitutional: Negative  HENT: Positive for hearing loss  Eyes: Positive for visual disturbance  Respiratory: Negative  Cardiovascular: Negative  Endocrine: Negative  Genitourinary: Negative  Musculoskeletal: Positive for arthralgias  Skin: Negative  Allergic/Immunologic: Negative  Neurological: Positive for weakness  Hematological: Negative  Psychiatric/Behavioral: Negative          PHQ-9 Depression Screening    PHQ-9:   Frequency of the following problems over the past two weeks:             Medications and orders       Current Outpatient Medications:     amLODIPine (NORVASC) 10 mg tablet, Take 1 tablet (10 mg total) by mouth daily, Disp: 90 tablet, Rfl: 3    aspirin (ECOTRIN LOW STRENGTH) 81 mg EC tablet, Take 81 mg by mouth daily, Disp: , Rfl:     B COMPLEX-C-FOLIC ACID PO, Take 1 tablet by mouth daily, Disp: , Rfl:     Cholecalciferol (VITAMIN D3) 1000 units CAPS, Take 5,000 Units by mouth, Disp: , Rfl:     docusate sodium (COLACE) 100 mg capsule, Take 1 capsule (100 mg total) by mouth 2 (two) times a day, Disp: 10 capsule, Rfl: 0    fluticasone (FLONASE) 50 mcg/act nasal spray, , Disp: , Rfl:     hydrALAZINE (APRESOLINE) 10 mg tablet, , Disp: , Rfl:     lisinopril (ZESTRIL) 5 mg tablet, Take 2 tablets (10 mg total) by mouth daily, Disp: , Rfl:     melatonin 3 mg, Take 1 tablet (3 mg total) by mouth daily at bedtime, Disp: , Rfl: 0    multivitamin-minerals (CENTRUM ADULTS) tablet, Take 1 tablet by mouth daily, Disp: , Rfl: 0    Omega-3 Fatty Acids (FISH OIL) 1200 MG CAPS, Take 1 capsule by mouth daily, Disp: , Rfl:     omeprazole (PriLOSEC) 20 mg delayed release capsule, Take 1 capsule (20 mg total) by mouth daily, Disp: 90 capsule, Rfl: 3    pravastatin (PRAVACHOL) 40 mg tablet, Take 40 mg by mouth daily ALTERNATES 20MG AND 40MG EVERY OTHER DAY, Disp: , Rfl:   No current facility-administered medications for this visit  Objective     Vitals:   Vitals:    01/21/21 1233   BP: 140/56   Pulse: 89   Resp: 20   Temp: 97 7 °F (36 5 °C)   SpO2: 94%   Weight: 60 2 kg (132 lb 11 2 oz)       Physical Exam  HENT:      Head: Normocephalic and atraumatic  Right Ear: Tympanic membrane, ear canal and external ear normal       Left Ear: Tympanic membrane, ear canal and external ear normal       Nose: Nose normal       Mouth/Throat:      Mouth: Mucous membranes are moist       Pharynx: Oropharynx is clear  Comments: Patient has a full set of dentures in the upper arcade  Lower arcade with few remnant Teeth  Eyes:      Conjunctiva/sclera: Conjunctivae normal       Pupils: Pupils are equal, round, and reactive to light  Comments: Evidence of arcus senilis noted   Neck:      Musculoskeletal: Normal range of motion and neck supple  Cardiovascular:      Rate and Rhythm: Normal rate and regular rhythm  Pulses: Normal pulses  Heart sounds: Normal heart sounds  Pulmonary:      Effort: Pulmonary effort is normal       Breath sounds: Normal breath sounds     Abdominal:      General: Abdomen is flat  Palpations: Abdomen is soft  Musculoskeletal: Normal range of motion  Skin:     General: Skin is warm  Neurological:      General: No focal deficit present  Psychiatric:         Mood and Affect: Mood normal          Behavior: Behavior normal          Thought Content: Thought content normal          Judgment: Judgment normal          Pertinent Laboratory/Diagnostic Studies: The following labs/studies were reviewed please see facility chart for details

## 2021-01-21 NOTE — UTILIZATION REVIEW
Notification of Discharge  This is a Notification of Discharge from our facility [address]  Please be advised that this patient has been discharge from our facility  Below you will find the admission and discharge date and time including the patients disposition  PRESENTATION DATE: 1/10/2021  7:46 PM  OBS ADMISSION DATE:   IP ADMISSION DATE: 1/10/21 2158   DISCHARGE DATE: 1/20/2021  8:58 PM  DISPOSITION: Non [facility]/S[facility] Non [facility] SN[facility]/SNU   Admission Orders listed below:  Admission Orders (From admission, onward)     Ordered        01/10/21 2203  Inpatient Admission  Once                   Please contact the UR Department if additional information is required to close this patient's authorization/case  [address] Utilization Review Department  Main: [phone] x carefully listen to the prompts  All voicemails are confidential   [email or web address]  Send all requests for admission clinical reviews, approved or denied determinations and any other requests to dedicated fax number below belonging to the campus where the patient is receiving treatment   List of dedicated fax numbers:  [address] DENIALS (Administrative/Medical Necessity) [phone]   [address] (Maternity/NICU/Pediatrics) [phone]   Juan Carlos Patel [phone]     [address] [phone]   Santos Carvajal [phone]   [facility] [address] [place] [address] [phone]   [facility]  [phone]   [address] [facility] [address] [facility]  [address] [facility] [address] [facility] [phone]

## 2021-01-21 NOTE — ASSESSMENT & PLAN NOTE
Patient with history of anemia of chronic disease baseline hemoglobin around 8 5 with a hematocrit 27 5  I do not see serum iron levels or tibc done in the past   He does have ferritin levels done stitched and they were elevated but ferritin is an acute phase reactant  so any time one has   an inflammatory response ferritin to be elevated

## 2021-01-21 NOTE — ASSESSMENT & PLAN NOTE
1) what matters most for patient -I spoke with patient's daughter who is his power of  very goal is for him to go to personal care should he get stronger with current therapy  The patient is DNR DNI  Patient's power of  is open for him to go on hospice once he finishes therapy  She would like to have his care done in this facility  She would prefer that the patient did not return to the hospital     2 -memory  -patient has had decline in his memory he will need to have a [place] test done the get a baseline  3 -mobility  The patient requires use of a walker to ambulate  Patient has had falls in the past according to the daughter  4 -medications  -patient currently on antihypertensive medications which can increase risk of orthostasis

## 2021-01-21 NOTE — UTILIZATION REVIEW
3RD  REQUEST FOR ADDITIONAL 2 DAYS - PLEASE REVIEW        Continued Stay Review    Date: 1/15/21                        Current Patient Class: IP Current Level of Care: MS    HPI:89 y o  male initially admitted on 1/10 with Pneumonia, sepsis, acute encephalopathy, ARF on CKD stage 3, previous Covid infection    Assessment/Plan:   Pt remains on IV antibiotics  So far blood cultures and urine culture are negative  Pt had an episode of tachycardia today  No hypoxia in last 48 hrs        Pertinent Labs/Diagnostic Results:   Results from last 7 days   Lab Units 01/20/21  1556   SARS-COV-2  Negative     Results from last 7 days   Lab Units 01/20/21  0534 01/18/21  0543 01/15/21  0521   WBC Thousand/uL 9 99 7 66 10 09   HEMOGLOBIN g/dL 8 5* 8 3* 8 5*   HEMATOCRIT % 27 5* 25 9* 25 7*   PLATELETS Thousands/uL 514* 470* 409*         Results from last 7 days   Lab Units 01/20/21  0534 01/19/21  0517 01/18/21  0543 01/15/21  0521   SODIUM mmol/L 134* 136 134* 132*   POTASSIUM mmol/L 4 7 4 3 4 4 3 7   CHLORIDE mmol/L 103 104 101 100   CO2 mmol/L 27 28 27 25   ANION GAP mmol/L 4 4 6 7   BUN mg/dL 29* 28* 29* 20   CREATININE mg/dL 1 79* 1 94* 2 09* 1 55*   EGFR ml/min/1 73sq m 32 29 26 38   CALCIUM mg/dL 8 9 9 2 9 1 9 4             Results from last 7 days   Lab Units 01/20/21  0534 01/19/21  0517 01/18/21  0543 01/15/21  0521   GLUCOSE RANDOM mg/dL 93 90 105 84                     Results from last 7 days   Lab Units 01/15/21  0521   PROCALCITONIN ng/ml 0 67*             Results from last 7 days   Lab Units 01/20/21  1556   INFLUENZA A PCR  Negative   INFLUENZA B PCR  Negative   RSV PCR  Negative         Vital Signs:   01/15/21 08:41:59  98 5 °F (36 9 °C)  107Abnormal     169/84  112  93 %     01/15/21 07:50:14    124Abnormal   16  99/72  81  96 %     01/15/21 0730              None (Room air)   01/14/21 22:00:46  98 6 °F (37 °C)  104  16  157/84  108  94 %     01/14/21 2015              None (Room air) 01/14/21 15:33:08  98 5 °F (36 9 °C)  99  18  161/83  109  95 %     01/14/21 0900  97 9 °F (36 6 °C)  112Abnormal   20  156/94  115  95 %  None (Room air)   01/13/21 22:58:09  98 2 °F (36 8 °C)  92  20  150/85  107  96 %     01/13/21 1915              None (Room air)   01/13/21 15:35:37  98 7 °F (37 1 °C)  97  22  148/66  93  94 %     01/13/21 10:55:49    103  16  128/94  105  91 %     01/13/21 09:36:53    99    126/71  89  96 %     01/13/21 0810            91 %     01/13/21 07:25:05  98 4 °F (36 9 °C)  94  16  109/59  76  88 %Abnormal      01/13/21 03:00:29  98 4 °F (36 9 °C)  99  20  128/69  89  90 %       Medications:   Scheduled Medications:  No current facility-administered medications for this encounter  Continuous IV Infusions:  No current facility-administered medications for this encounter  IV fluids d/c 1/14    PRN Meds:  acetaminophen, 650 mg, Oral, Q6H PRN  aluminum-magnesium hydroxide-simethicone, 30 mL, Oral, Q6H PRN  ondansetron, 4 mg, Intravenous, Q6H PRN  [place]cean nasal spray PRN q 1 hr  - x 1 1/14 and d/c     Discharge Plan: rehab    Network Utilization Review Department  ATTENTION: Please call with any questions or concerns to [phone] and carefully listen to the prompts so that you are directed to the right person  All voicemails are confidential   Aly Virgen all requests for admission clinical reviews, approved or denied determinations and any other requests to dedicated fax number below belonging to the campus where the patient is receiving treatment   List of dedicated fax numbers for the Facilities:  [address] DENIALS (Administrative/Medical Necessity) [phone]   [address] (Maternity/NICU/Pediatrics) 261 [facility] [address] [phone] 501 [facility] (  Dariusz Contreras "[address]" 103) [address] [place] [address] Annetta Melendez [address] [phone]   [place] [address] [phone]

## 2021-01-22 ENCOUNTER — NURSING HOME VISIT (OUTPATIENT)
Dept: GERIATRICS | Facility: OTHER | Age: 86
End: 2021-01-22
Payer: COMMERCIAL

## 2021-01-22 DIAGNOSIS — E87.1 HYPONATREMIA: ICD-10-CM

## 2021-01-22 DIAGNOSIS — D72.829 LEUKOCYTOSIS, UNSPECIFIED TYPE: ICD-10-CM

## 2021-01-22 DIAGNOSIS — N18.32 ACUTE RENAL FAILURE SUPERIMPOSED ON STAGE 3B CHRONIC KIDNEY DISEASE, UNSPECIFIED ACUTE RENAL FAILURE TYPE (HCC): ICD-10-CM

## 2021-01-22 DIAGNOSIS — R50.81 FEVER IN OTHER DISEASES: Primary | ICD-10-CM

## 2021-01-22 DIAGNOSIS — N17.9 ACUTE RENAL FAILURE SUPERIMPOSED ON STAGE 3B CHRONIC KIDNEY DISEASE, UNSPECIFIED ACUTE RENAL FAILURE TYPE (HCC): ICD-10-CM

## 2021-01-22 PROCEDURE — 99309 SBSQ NF CARE MODERATE MDM 30: CPT | Performed by: NURSE PRACTITIONER

## 2021-01-24 PROBLEM — R50.9 FEVER: Status: ACTIVE | Noted: 2021-01-22

## 2021-01-25 NOTE — ASSESSMENT & PLAN NOTE
- Stat chest x-ray and UAC&S ordered  - Continue prn tylenol  - Encourage PO fluid intake  - Will monitor CBC

## 2021-01-25 NOTE — ASSESSMENT & PLAN NOTE
Lab Results   Component Value Date    EGFR 32 01/20/2021    EGFR 29 01/19/2021    EGFR 26 01/18/2021    CREATININE 1 79 (H) 01/20/2021    CREATININE 1 94 (H) 01/19/2021    CREATININE 2 09 (H) 01/18/2021   - Baseline creatinine approximately 1 56  - Creatinine improved since hospitalization, now at 1 90  - Continue to avoid nephrotoxins and hypotension

## 2021-01-25 NOTE — PROGRESS NOTES
[address] [id number] (156) [address]   Acute Visit Note  POS 31        NAME: Cristobal Womack  AGE: 80 y o  SEX: male  :  1927  DATE OF ENCOUNTER: 2021    Chief Complaint   Patient seen and examined for follow-up for fever, leukocytosis    History of Present Illness     Adalberto Munroe is a 80year old male patient with multiple co-morbidities as listed below seen and examined per nursing request for fever  Patient is status post hospitalization for fever, syncope, pneumonia and acute on chronic renal failure  He was treated with IV antibiotics and IV fluids to help improve his renal status  Last night, nursing reports that the patient developed a high fever greater than 100 0 and he was very lethargic  Stat blood work was done and shows leukocytosis with white blood cells being 19 2 and elevated neutrophils  Upon examination, at bedside patient appears very tired  He denies chest pain, shortness of breath, abdominal pain, nausea, vomiting, diarrhea, constipation, headache, dizziness, blurred vision  The following portions of the patient's history were reviewed and updated as appropriate: allergies, current medications, past family history, past medical history, past social history, past surgical history and problem list     Review of Systems     A review of systems was performed  All negative, except as per HPI      History     Past Medical History:   Diagnosis Date    HANNA (acute kidney injury) ([facility] [address] ) 2017    Arthritis     Cataracts, both eyes     HCAP (healthcare-associated pneumonia) 10/24/2019    Hyperlipidemia     Hypertension     Problems with hearing     Severe sepsis ([address] ) 2017    Stroke ([facility] [address] )     Syncope 10/23/2019     Past Surgical History:   Procedure Laterality Date    CAROTID ARTERY ANGIOPLASTY      CATARACT EXTRACTION       Family History   Problem Relation Age of Onset    Hypertension Mother    Osprudencio Ok Hypertension Daughter      Social History     Socioeconomic History    Marital status:       Spouse name: Not on file    Number of children: Not on file    Years of education: Not on file    Highest education level: Not on file   Occupational History    Not on file   Social Needs    Financial resource strain: Not on file    Food insecurity     Worry: Not on file     Inability: Not on file    Transportation needs     Medical: Not on file     Non-medical: Not on file   Tobacco Use    Smoking status: Never Smoker    Smokeless tobacco: Never Used   Substance and Sexual Activity    Alcohol use: Never     Frequency: Never     Binge frequency: Never    Drug use: No    Sexual activity: Not Currently   Lifestyle    Physical activity     Days per week: Not on file     Minutes per session: Not on file    Stress: Not on file   Relationships    Social connections     Talks on phone: Not on file     Gets together: Not on file     Attends Sikh service: Not on file     Active member of club or organization: Not on file     Attends meetings of clubs or organizations: Not on file     Relationship status: Not on file    Intimate partner violence     Fear of current or ex partner: Not on file     Emotionally abused: Not on file     Physically abused: Not on file     Forced sexual activity: Not on file   Other Topics Concern    Not on file   Social History Narrative    From [facility]     No Known Allergies    Objective     Vital Signs  BP:   111/53       HR:  72 T: 97 7    RR 20 O2Sat:   91% on room air W:  133 5 lb  General: NAD, Well Nourished, Well Developed  Oral: Oropharynx Moist and Clear  Neck: Supple, +ROM  CV: S1, S2, normal rate, regular rhythm, no murmur appreciated  Pulmonary: Lung sounds clear to air, diminished in the bases, no wheezing, rhonchi, rales  Abdominal:BS + x4 in all quadrants, soft, no mass, no tenderness  Extremities: No edema, +ROM, +Strength  Skin: Warm, Dry, no lesions, no rash, no erythema present, no ecchymosis present  Neurological: CN 2-12 intact, PERRLA  Psych: Alert and oriented times 3, no mood, no affect, good judgement    Pertinent Laboratory/Diagnostic Studies:  01/21/2021:  Hemoglobin 9 0, hematocrit 25 9, platelet count 920, WBC 19 2, glucose 136, BUN 29, creatinine 1 90, sodium 131, potassium 5 0, chloride 100, CO2 22, EGFR 30      Current Medications     Current Medications Reviewed and updated in Nursing Home EMR      Assessment and Plan     Fever  - Stat chest x-ray and UAC&S ordered  - Continue prn tylenol  - Encourage PO fluid intake  - Will monitor CBC    Leukocytosis  - Chronic leukocytosis most likely from steroids  - Patient now with elevated temperature and elevated neutrophils  - Will work up to rule out underlying causes  - Will continue to monitor    Hyponatremia  - Sodium level dropped to 131 from 134  - Will monitor closely  - Repeat BMP in the am ordered    Acute renal failure superimposed on stage 3 chronic kidney disease ([facility] [address] )  Lab Results   Component Value Date    EGFR 32 01/20/2021    EGFR 29 01/19/2021    EGFR 26 01/18/2021    CREATININE 1 79 (H) 01/20/2021    CREATININE 1 94 (H) 01/19/2021    CREATININE 2 09 (H) 01/18/2021   - Baseline creatinine approximately 1 56  - Creatinine improved since hospitalization, now at 1 90  - Continue to avoid nephrotoxins and hypotension      [address] [facility]  01/22/2021

## 2021-01-25 NOTE — ASSESSMENT & PLAN NOTE
- Chronic leukocytosis most likely from steroids  - Patient now with elevated temperature and elevated neutrophils  - Will work up to rule out underlying causes  - Will continue to monitor

## 2021-01-26 ENCOUNTER — NURSING HOME VISIT (OUTPATIENT)
Dept: GERIATRICS | Facility: OTHER | Age: 86
End: 2021-01-26
Payer: COMMERCIAL

## 2021-01-26 DIAGNOSIS — I10 ESSENTIAL HYPERTENSION: Chronic | ICD-10-CM

## 2021-01-26 DIAGNOSIS — E43 SEVERE PROTEIN-CALORIE MALNUTRITION (HCC): ICD-10-CM

## 2021-01-26 DIAGNOSIS — N17.9 ACUTE RENAL FAILURE SUPERIMPOSED ON STAGE 3B CHRONIC KIDNEY DISEASE, UNSPECIFIED ACUTE RENAL FAILURE TYPE (HCC): Primary | ICD-10-CM

## 2021-01-26 DIAGNOSIS — D64.9 ANEMIA, UNSPECIFIED TYPE: ICD-10-CM

## 2021-01-26 DIAGNOSIS — N18.32 ACUTE RENAL FAILURE SUPERIMPOSED ON STAGE 3B CHRONIC KIDNEY DISEASE, UNSPECIFIED ACUTE RENAL FAILURE TYPE (HCC): Primary | ICD-10-CM

## 2021-01-26 PROCEDURE — 99309 SBSQ NF CARE MODERATE MDM 30: CPT | Performed by: NURSE PRACTITIONER

## 2021-01-26 NOTE — PROGRESS NOTES
[address] [id number] [phone] [facility]   Progress Note  POS 31      NAME: Jenny Marshall  AGE: 80 y o  SEX: male  :  1927  DATE OF ENCOUNTER: 2021    Chief Complaint   Patient seen and examined for follow up on chronic conditions  History of Present Illness     80 year old male patient with multiple co-morbidities as listed below  seen and examined today to follow-up on acute and chronic medical conditions  He is status post hospitalization for syncope, HANNA, pneumonia and orthostatic hypotension from 01/10/2021 to  2021  Upon examination, patient is lying in bed, calm, cooperative and in no acute distress  He states that he is tired  He denies chest pain, sob, abdominal pain, nausea, vomiting, diarrhea, constipation, myalgia, arthralgia, fever, chills, headache, dizziness or visual disturbance  Nursing reports that he has been experiencing generalized weakness over the past several days  The following portions of the patient's history were reviewed and updated as appropriate: allergies, current medications, past family history, past medical history, past social history, past surgical history and problem list     Review of Systems     A review of systems was performed  All negative, except as per HPI  History     Past Medical History:   Diagnosis Date    HANNA (acute kidney injury) ([facility] ) 2017    Arthritis     Cataracts, both eyes     HCAP (healthcare-associated pneumonia) 10/24/2019    Hyperlipidemia     Hypertension     Problems with hearing     Severe sepsis ([address] ) 2017    Stroke ([facility] )     Syncope 10/23/2019     Past Surgical History:   Procedure Laterality Date    CAROTID ARTERY ANGIOPLASTY      CATARACT EXTRACTION       Family History   Problem Relation Age of Onset    Hypertension Mother     Hypertension Daughter      Social History     Socioeconomic History    Marital status:       Spouse name: Not on file    Number of children: Not on file    Years of education: Not on file    Highest education level: Not on file   Occupational History    Not on file   Social Needs    Financial resource strain: Not on file    Food insecurity     Worry: Not on file     Inability: Not on file    Transportation needs     Medical: Not on file     Non-medical: Not on file   Tobacco Use    Smoking status: Never Smoker    Smokeless tobacco: Never Used   Substance and Sexual Activity    Alcohol use: Never     Frequency: Never     Binge frequency: Never    Drug use: No    Sexual activity: Not Currently   Lifestyle    Physical activity     Days per week: Not on file     Minutes per session: Not on file    Stress: Not on file   Relationships    Social connections     Talks on phone: Not on file     Gets together: Not on file     Attends Anabaptist service: Not on file     Active member of club or organization: Not on file     Attends meetings of clubs or organizations: Not on file     Relationship status: Not on file    Intimate partner violence     Fear of current or ex partner: Not on file     Emotionally abused: Not on file     Physically abused: Not on file     Forced sexual activity: Not on file   Other Topics Concern    Not on file   Social History Narrative    From [facility]     No Known Allergies    Objective     Vital Signs  BP: 126/56    HR: 79 T: 98 3    RR: 17  O2Sat: 93% RA W: 130 5 lbs  General: NAD, Non-toxic appearing, frail and deconditioned  Oral: Oropharynx Moist and Clear  Neck: Supple, +ROM  CV: S1, S2, normal rate, regular rhythm, no murmur appreciated  Pulmonary: Lung sounds clear to air, no wheezing, rhonchi, rales  Abdominal:BS + x4 in all quadrants, soft, no mass, no tenderness  Extremities: No edema, +ROM, +Strength  Skin: Warm, Dry, no lesions, no rash, no erythema present, no ecchymosis present  Neurological: CN 2-12 intact, PERRLA  Psych: Awake and alert , no mood, flat affect    Pertinent Laboratory/Diagnostic Studies:  01/25/2021: Hemoglobin 10 2, Hematocrit 30 1, Platelet Count 562, WBC 10 1, Glucose 76, BUN 29, Creatinine 2 11, Sodium 133, Potassium 5 0, Chloride 102, CO2 25, eGFR 26, Iron level 34      Current Medications     Current Medications Reviewed and updated in Nursing Home EMR      Assessment and Plan     Acute renal failure superimposed on stage 3 chronic kidney disease ([facility] [address] )  Lab Results   Component Value Date    EGFR 32 01/20/2021    EGFR 29 01/19/2021    EGFR 26 01/18/2021    CREATININE 1 79 (H) 01/20/2021    CREATININE 1 94 (H) 01/19/2021    CREATININE 2 09 (H) 01/18/2021   - Creatinine trending upward, now at 2 11  - Baseline creatinine from hospital records 1 6 to 1 8  - Encourage PO fluid intake  - Avoid hypotension and nephrotoxins as possible  - Will recheck BMP on Thursday 01/28/2020    Anemia  - Iron level low at 34  - Ferrous Sulfate 325 mg PO daily ordered  - Will monitor CBC    Essential hypertension  - BP currently at goal  - He has a history of intermittent low blood pressures  - Continue Hydralazine 10 mg PO every 8 hours, Amlodipine 10 mg PO daily and Lisinopril 5 mg PO daily  - Will continue to monitor, if and decrease antihypertensive medication if low bp's persist to prevent orthostasis  - Will monitor electrolytes and renal function    Severe protein-calorie malnutrition (HCC)  - 8 1 pound weight loss since 12/29/2020  - On average, he eats between 50 to 75% of his meals  - Will increase house shakes to BID  - Albumin level 2 6  - Will continue to monitor weights and [facility]  01/26/2021

## 2021-01-27 ENCOUNTER — TELEPHONE (OUTPATIENT)
Dept: OTHER | Facility: OTHER | Age: 86
End: 2021-01-27

## 2021-01-27 NOTE — ASSESSMENT & PLAN NOTE
- 8 1 pound weight loss since 12/29/2020  - On average, he eats between 50 to 75% of his meals  - Will increase house shakes to BID  - Albumin level 2 6  - Will continue to monitor weights and CMP

## 2021-01-27 NOTE — ASSESSMENT & PLAN NOTE
- BP currently at goal  - He has a history of intermittent low blood pressures  - Continue Hydralazine 10 mg PO every 8 hours, Amlodipine 10 mg PO daily and Lisinopril 5 mg PO daily  - Will continue to monitor, if and decrease antihypertensive medication if low bp's persist to prevent orthostasis  - Will monitor electrolytes and renal function

## 2021-01-27 NOTE — ASSESSMENT & PLAN NOTE
Lab Results   Component Value Date    EGFR 32 01/20/2021    EGFR 29 01/19/2021    EGFR 26 01/18/2021    CREATININE 1 79 (H) 01/20/2021    CREATININE 1 94 (H) 01/19/2021    CREATININE 2 09 (H) 01/18/2021   - Creatinine trending upward, now at 2 11  - Baseline creatinine from hospital records 1 6 to 1 8  - Encourage PO fluid intake  - Avoid hypotension and nephrotoxins as possible  - Will recheck BMP on Thursday 01/28/2020

## 2021-01-28 ENCOUNTER — NURSING HOME VISIT (OUTPATIENT)
Dept: GERIATRICS | Facility: OTHER | Age: 86
End: 2021-01-28
Payer: COMMERCIAL

## 2021-01-28 DIAGNOSIS — E87.1 HYPONATREMIA: ICD-10-CM

## 2021-01-28 DIAGNOSIS — N17.9 ACUTE RENAL FAILURE SUPERIMPOSED ON STAGE 3B CHRONIC KIDNEY DISEASE, UNSPECIFIED ACUTE RENAL FAILURE TYPE (HCC): ICD-10-CM

## 2021-01-28 DIAGNOSIS — D64.9 ANEMIA, UNSPECIFIED TYPE: ICD-10-CM

## 2021-01-28 DIAGNOSIS — N18.32 ACUTE RENAL FAILURE SUPERIMPOSED ON STAGE 3B CHRONIC KIDNEY DISEASE, UNSPECIFIED ACUTE RENAL FAILURE TYPE (HCC): ICD-10-CM

## 2021-01-28 DIAGNOSIS — Z71.89 GOALS OF CARE, COUNSELING/DISCUSSION: Primary | ICD-10-CM

## 2021-01-28 DIAGNOSIS — J18.9 PNEUMONIA OF LEFT LUNG DUE TO INFECTIOUS ORGANISM, UNSPECIFIED PART OF LUNG: ICD-10-CM

## 2021-01-28 DIAGNOSIS — Z86.73 HISTORY OF STROKE: ICD-10-CM

## 2021-01-28 DIAGNOSIS — K21.9 GASTROESOPHAGEAL REFLUX DISEASE WITHOUT ESOPHAGITIS: ICD-10-CM

## 2021-01-28 DIAGNOSIS — E43 SEVERE PROTEIN-CALORIE MALNUTRITION (HCC): ICD-10-CM

## 2021-01-28 DIAGNOSIS — J96.01 ACUTE RESPIRATORY FAILURE WITH HYPOXIA (HCC): ICD-10-CM

## 2021-01-28 PROCEDURE — 99310 SBSQ NF CARE HIGH MDM 45: CPT | Performed by: FAMILY MEDICINE

## 2021-01-28 NOTE — ASSESSMENT & PLAN NOTE
· PO intake slowly improving  · Patient has had significant weight loss in the past couple of weeks, he has noticeable muscle mass loss as well as subcutaneous fatty tissue loss throughout  · Start Boost shakes QHS  · Encourage PO intake

## 2021-01-28 NOTE — ASSESSMENT & PLAN NOTE
· Patient with recent Covid 19 pneumonia and significant physical deconditioning  · Continues on O2 via NC at 2 LPM   · Continue supportive care, O2 PRN to keep SPO2 > 90%  · Incentive spirometry

## 2021-01-28 NOTE — PROGRESS NOTES
[address] [address] [facility]  [phone]    [facility]  PROGRESS NOTE        NAME: Sally Olson  AGE: 80 y o  SEX: male  :  1927  DATE OF ENCOUNTER: 2021  POS: 31 (SNF)    Assessment and Plan     Goals of care, counseling/discussion  · Patient alert and oriented x4 at the time of my visit  · Had a lengthy discussion with him regarding goals of care, he very clearly insists he does not wish to return to the hospital   · He understands his condition is guarded at this time, I have expressed concern regarding possible underlying sepsis vs SIRS with worsening leukocytosis  He understands declining higher level of care at this time may have fatal consequences and strongly insists if his clinical condition continues to decline he would like to pursue palliative oriented care  · Discussed at length with daughter Alcides Molina over the phone who agrees and supports patient's decision  · Do NOT rehospitalize order placed in chart  Severe protein-calorie malnutrition (HCC)  · PO intake slowly improving  · Patient has had significant weight loss in the past couple of weeks, he has noticeable muscle mass loss as well as subcutaneous fatty tissue loss throughout  · Start Boost shakes QHS  · Encourage PO intake  Anemia  · In the setting of chronic disease  · Hemoglobin low at 9 4  · Continue to monitor  · Continue ferrous sulfate  Pneumonia involving left lung  · Recurrent pneumonia  · Has now involvement of left lung with low grade fevers and leukocytosis  · Started on doxycycline  · Monitor for fever, respiratory decline  · Monitor wbc  Encourage adequate hydration  GERD (gastroesophageal reflux disease)  · Continue Omeprazole 20 mg daily  Acute respiratory failure with hypoxia ([facility] [address] )  · Patient with recent Covid 19 pneumonia and significant physical deconditioning    · Continues on O2 via NC at 2 LPM   · Continue supportive care, O2 PRN to keep SPO2 > 90%  · Incentive spirometry  Acute renal failure superimposed on stage 3 chronic kidney disease [facility])  Lab Results   Component Value Date    EGFR 32 01/20/2021    EGFR 29 01/19/2021    EGFR 26 01/18/2021    CREATININE 1 79 (H) 01/20/2021    CREATININE 1 94 (H) 01/19/2021    CREATININE 2 09 (H) 01/18/2021   · Elevated creatinine at 2 11   · Baseline creatinine 1 6-1 8  · Continue to encourage adequate hydration  · Avoid hypotension/nephrotoxins as possible  Hyponatremia  · Chronic hyponatremia  · Patient with poor oral intake and recurrent pneumonias, likely multifactorial in origin  · Continue to monitor  History of stroke  · Continue aspirin and statin  Discussed at length with daughter, Pastora Soares over the phone  I have expressed concern regarding possible underlying septic process vs SIRS in the setting of low grade fevers and leukocytosis as well as HANNA  Hospital evaluation is advised, however, patient and daughter agree he does not wish to return to the hospital and understands not pursuing higher level of care can result in fatal consequences  They expressed understanding and wish for Nakul Gleason to remain at the facility  Seble Rg MD  Geriatric Medicine  01/28/2021       Chief Complaint   Patient seen and examined for follow up on chronic conditions  History of Present Illness       Mr Liliana Dee is a 79 yo gentleman with underlying essential hypertension, carotid artery stenosis, s/p carotid endarterectomy, hx of basal ganglia stroke, GERD, HLD, CKD4, sinus bradycardia, arthritis, admitted originally to [facility] 11/06-11/09/20 after a syncopal episode at his PCPs office  Joanne was admitted to [address] [place] [address] on 11/09  He had been participating in a comprehensive therapy course at the facility, during his stay he had one more syncopal episode and a presyncopal episode within 24 hours  Had an EKG that showed NSR with PACs and low voltage   Patient and daughters had declined hospitalization for further work up and wanted to pursue comfort measures  He tested positive for Covid 19 on 12/16/20 and was noted to have acute change in mental status since diagnosis that had continued to worsen  on 12/22 he was sent back to the hospital after an episode of hypoxia  found to have hazy opacities over L mid lung field  Admitted to the hospital with Covid 19 sepsis, completed a 5 day course of Remdesivir and a 7 day course of Ceftriaxone  Completed systemic steroid therapy  Inflammatory markers had improved  Again readmitted to [facility] for rehab following that hospital stay  He conitnued to decline with generalized weakness and poor oral intake  On 1/10 he had a syncopal episode and was sent out to the ED for further evaluation  In the ED found to be hyponatremic, meeting sepsis criteria with leukocytosis to 20 4 and tachycardia  Chest Xray showed new R upper lobe opacification  Completed a 7 day course of ceftriaxone  Blood cultures remained negative x 72 hrs  He was noted to be confused during hospital stay, seems like his confusion has only been getting worse since diagnosis of Covid 23 in December 2020  Once considered medically stable, he was discharged back to [facility] for subacute rehab services  He has remained somewhat lethargic and confused at rehab facility  Last night patient was noted to have a low grade fever and lightheadedness upon ambulating to the bathroom  A stat Chest Xray was done and found to have new L sided infiltrate  Started on PO doxycycline  Blood work obtained this morning reviewed, patient's WBC significantly elevated to 24 5, hyponatremic to 132  Seen and examined today for follow up  Feeling better but very tired  He is alert and oriented x4 at the time of my evaluation         The following portions of the patient's history were reviewed and updated as appropriate: allergies, current medications, past family history, past medical history, past social history, past surgical history and problem list     Review of Systems     A complete review of systems was performed  All negative, except as per HPI  History     Past Medical History:   Diagnosis Date    HANNA (acute kidney injury) ([facility] ) 6/20/2017    Arthritis     Cataracts, both eyes     HCAP (healthcare-associated pneumonia) 10/24/2019    Hyperlipidemia     Hypertension     Problems with hearing     Severe sepsis ([facility] ) 6/20/2017    Stroke ([facility] )     Syncope 10/23/2019     No Known Allergies    Objective     Vital Signs  BP:  118/49       HR: 85 T: 97 7°    RR: 20 O2Sat: 95% on RA W: 131 2    Physical Exam  Vitals signs reviewed  Constitutional:       General: He is not in acute distress  Appearance: He is well-developed  He is ill-appearing (chronically)  He is not diaphoretic  Comments: Looks frail and deconditioned  Muscle mass and subcutaneous fatty tissue loss noted throughout  HENT:      Head: Normocephalic and atraumatic  Right Ear: External ear normal       Left Ear: External ear normal       Mouth/Throat:      Mouth: Mucous membranes are moist       Pharynx: Oropharynx is clear  No oropharyngeal exudate  Eyes:      General: No scleral icterus  Extraocular Movements: Extraocular movements intact  Conjunctiva/sclera: Conjunctivae normal       Pupils: Pupils are equal, round, and reactive to light  Neck:      Musculoskeletal: Normal range of motion and neck supple  Thyroid: No thyromegaly  Vascular: No JVD  Cardiovascular:      Rate and Rhythm: Normal rate and regular rhythm  Heart sounds: Normal heart sounds  No murmur  Pulmonary:      Effort: Pulmonary effort is normal  No respiratory distress  Breath sounds: Normal breath sounds  Comments: NC in place at 2lpm  Abdominal:      General: Bowel sounds are normal  There is no distension  Palpations: Abdomen is soft  Tenderness:  There is no abdominal tenderness  Musculoskeletal: Normal range of motion  General: No tenderness or deformity  Lymphadenopathy:      Cervical: No cervical adenopathy  Skin:     General: Skin is warm and dry  Coloration: Skin is not pale  Findings: No erythema or rash  Neurological:      General: No focal deficit present  Mental Status: He is alert and oriented to person, place, and time  Cranial Nerves: No cranial nerve deficit  Motor: No abnormal muscle tone  Deep Tendon Reflexes: Reflexes are normal and symmetric  Reflexes normal    Psychiatric:         Mood and Affect: Mood normal          Behavior: Behavior normal          Thought Content: Thought content normal          Judgment: Judgment normal            Pertinent Laboratory/Diagnostic Studies      01/28/2021:  Na 132, K 4 7, Cl 101, CO2 20, BUN 40, CR 2 11, GLU 68, EGFR 26  01/28/2021:  Alk-phos 105, albumin 2 4, total bili 0 4, total protein 5 8, AST 15, ALT 19  01/28/2021:  WBC 24 5, HB 9 4, HCT 28 9,   01/27/2021:  Chest x-ray patchy infiltrate in the left lung  Current Medications     Current Medications Reviewed and updated in EMR  Monthly orders reviewed and signed in chart  Please note:  Voice-recognition software may have been used in the preparation of this document  Occasional wrong word or "sound-alike" substitutions may have occurred due to the inherent limitations of voice recognition software  Interpretation should be guided by context

## 2021-01-28 NOTE — TELEPHONE ENCOUNTER
PT has a fever 99 7and he is having episodes of unresponsiveness, he comes back quickly dizziness was found holding sink in the bathroom and shaking

## 2021-01-28 NOTE — ASSESSMENT & PLAN NOTE
· Recurrent pneumonia  · Has now involvement of left lung with low grade fevers and leukocytosis  · Started on doxycycline  · Monitor for fever, respiratory decline  · Monitor wbc  Encourage adequate hydration

## 2021-01-28 NOTE — ASSESSMENT & PLAN NOTE
· Patient alert and oriented x4 at the time of my visit  · Had a lengthy discussion with him regarding goals of care, he very clearly insists he does not wish to return to the hospital   · He understands his condition is guarded at this time, I have expressed concern regarding possible underlying sepsis vs SIRS with worsening leukocytosis  He understands declining higher level of care at this time may have fatal consequences and strongly insists if his clinical condition continues to decline he would like to pursue palliative oriented care  · Discussed at length with daughter Misbah Dacosta over the phone who agrees and supports patient's decision  · Do NOT rehospitalize order placed in chart

## 2021-01-28 NOTE — ASSESSMENT & PLAN NOTE
· In the setting of chronic disease  · Hemoglobin low at 9 4  · Continue to monitor  · Continue ferrous sulfate

## 2021-01-28 NOTE — UTILIZATION REVIEW
Review for  2 additional days  Continued Stay Review    Date: 1/15/21                        Current Patient Class: IP Current Level of Care: MS    HPI:89 y o  male initially admitted on 1/10 with Pneumonia, sepsis, acute encephalopathy, ARF on CKD stage 3, previous Covid infection    Assessment/Plan:   Pt remains on IV antibiotics  So far blood cultures and urine culture are negative  Pt had an episode of tachycardia today  No hypoxia in last 48 hrs  Pertinent Labs/Diagnostic Results:                                                                   Vital Signs:   01/15/21 08:41:59  98 5 °F (36 9 °C)  107Abnormal     169/84  112  93 %     01/15/21 07:50:14    124Abnormal   16  99/72  81  96 %     01/15/21 0730              None (Room air)   01/14/21 22:00:46  98 6 °F (37 °C)  104  16  157/84  108  94 %     01/14/21 2015              None (Room air)   01/14/21 15:33:08  98 5 °F (36 9 °C)  99  18  161/83  109  95 %     01/14/21 0900  97 9 °F (36 6 °C)  112Abnormal   20  156/94  115  95 %  None (Room air)   01/13/21 22:58:09  98 2 °F (36 8 °C)  92  20  150/85  107  96 %     01/13/21 1915              None (Room air)   01/13/21 15:35:37  98 7 °F (37 1 °C)  97  22  148/66  93  94 %     01/13/21 10:55:49    103  16  128/94  105  91 %     01/13/21 09:36:53    99    126/71  89  96 %     01/13/21 0810            91 %     01/13/21 07:25:05  98 4 °F (36 9 °C)  94  16  109/59  76  88 %Abnormal      01/13/21 03:00:29  98 4 °F (36 9 °C)  99  20  128/69  89  90 %       Medications:   Scheduled Medications:  No current facility-administered medications for this encounter  Continuous IV Infusions:  No current facility-administered medications for this encounter     IV fluids d/c 1/14    PRN Meds:  acetaminophen, 650 mg, Oral, Q6H PRN  aluminum-magnesium hydroxide-simethicone, 30 mL, Oral, Q6H PRN  ondansetron, 4 mg, Intravenous, Q6H PRN  Ocean nasal spray PRN q 1 hr  - x 1 1/14 and d/c     Discharge Plan: rehab    Network Utilization Review Department  ATTENTION: Please call with any questions or concerns to [phone] and carefully listen to the prompts so that you are directed to the right person  All voicemails are confidential   Aly Virgen all requests for admission clinical reviews, approved or denied determinations and any other requests to dedicated fax number below belonging to the campus where the patient is receiving treatment   List of dedicated fax numbers for the Facilities:  [address] DENIALS (Administrative/Medical Necessity) [phone]   [address] (Maternity/NICU/Pediatrics) [phone]   [address] [facility] [address] Armani Rowell 7353 (  Dariusz Contreras "Bianca" 103) [address] [place] [address] Annetta Melendez 14[address] [place]) [address] [place] [address] [phone]

## 2021-01-29 NOTE — ASSESSMENT & PLAN NOTE
· Chronic hyponatremia  · Patient with poor oral intake and recurrent pneumonias, likely multifactorial in origin  · Continue to monitor

## 2021-01-29 NOTE — ASSESSMENT & PLAN NOTE
Lab Results   Component Value Date    EGFR 32 01/20/2021    EGFR 29 01/19/2021    EGFR 26 01/18/2021    CREATININE 1 79 (H) 01/20/2021    CREATININE 1 94 (H) 01/19/2021    CREATININE 2 09 (H) 01/18/2021   · Elevated creatinine at 2 11   · Baseline creatinine 1 6-1 8  · Continue to encourage adequate hydration  · Avoid hypotension/nephrotoxins as possible

## 2021-02-07 ENCOUNTER — TELEPHONE (OUTPATIENT)
Dept: OTHER | Facility: OTHER | Age: 86
End: 2021-02-07

## 2021-02-08 ENCOUNTER — NURSING HOME VISIT (OUTPATIENT)
Dept: GERIATRICS | Facility: OTHER | Age: 86
End: 2021-02-08
Payer: COMMERCIAL

## 2021-02-08 DIAGNOSIS — D64.9 ANEMIA, UNSPECIFIED TYPE: ICD-10-CM

## 2021-02-08 DIAGNOSIS — N17.9 ACUTE RENAL FAILURE SUPERIMPOSED ON STAGE 3B CHRONIC KIDNEY DISEASE, UNSPECIFIED ACUTE RENAL FAILURE TYPE (HCC): ICD-10-CM

## 2021-02-08 DIAGNOSIS — I10 ESSENTIAL HYPERTENSION: Chronic | ICD-10-CM

## 2021-02-08 DIAGNOSIS — N18.32 ACUTE RENAL FAILURE SUPERIMPOSED ON STAGE 3B CHRONIC KIDNEY DISEASE, UNSPECIFIED ACUTE RENAL FAILURE TYPE (HCC): ICD-10-CM

## 2021-02-08 DIAGNOSIS — R41.89 EPISODE OF UNRESPONSIVENESS: Primary | ICD-10-CM

## 2021-02-08 DIAGNOSIS — E43 SEVERE PROTEIN-CALORIE MALNUTRITION (HCC): ICD-10-CM

## 2021-02-08 DIAGNOSIS — Z71.89 GOALS OF CARE, COUNSELING/DISCUSSION: ICD-10-CM

## 2021-02-08 PROCEDURE — 99310 SBSQ NF CARE HIGH MDM 45: CPT | Performed by: NURSE PRACTITIONER

## 2021-02-08 NOTE — PROGRESS NOTES
[facility]  [address] [facility]  [phone] (812) [address]   Acute Visit Note  POS 32      NAME: Elena Bhakta  AGE: 80 y o  SEX: male  :  1927  DATE OF ENCOUNTER: 2021    Chief Complaint   Patient seen and examined for unresponsive episode    History of Present Illness     Antonina Cramer is a 79 yo male patient with underlying essential hypertension, carotid artery stenosis, s/p carotid endarterectomy, hx of basal ganglia stroke, GERD, HLD, CKD4, sinus bradycardia, arthritis, admitted originally to [facility] -20 after a syncopal episode at his PCPs office  He was admitted to [facility] on   He had been participating in a comprehensive therapy course at the facility, during his stay he had one more syncopal episode and a presyncopal episode within 24 hours  Had an EKG that showed NSR with PACs and low voltage  Patient and daughters had declined hospitalization for further work up and wanted to pursue comfort measures  He tested positive for Covid 19 on 20 and was noted to have acute change in mental status since diagnosis that had continued to worsen  on  he was sent back to the hospital after an episode of hypoxia  found to have hazy opacities over L mid lung field  He was admitted to the hospital with Covid 19 sepsis, completed a 5 day course of Remdesivir and a 7 day course of Ceftriaxone  Completed systemic steroid therapy  Inflammatory markers had improved  Again readmitted to [facility] for rehab following that hospital stay  He conitnued to decline with generalized weakness and poor oral intake  On 1/10 he had a syncopal episode and was sent out to the ED for further evaluation  In the ED found to be hyponatremic, meeting sepsis criteria with leukocytosis to 20 4 and tachycardia  Chest Xray showed new R upper lobe opacification  Completed a 7 day course of ceftriaxone  Blood cultures remained negative x 72 hrs      He was noted to be confused during the hospital stay, seems like his confusion has only been getting worse since diagnosis of Covid 19 in December 2020  Once considered medically stable, he was discharged back to [facility] for subacute rehab services  On 01/28/2021, he was noted to have a low grade fever and lightheadedness upon ambulating to the bathroom  A stat Chest Xray was done and found to have new L sided infiltrate  Started on PO doxycycline  On review of CBC on 02/04/2021, patients WBC's remain elevated at 19 4 after antibiotic therapy  He also remains hyponatremic with Sodium level of 132  He has remained somewhat lethargic and confused at rehab facility  He is reported to have another unresponsive episode on 02/04/2021  He was found leaning on the right side of his chair  Family and patient do not wish for any more hospitalizations and patient was made a DNR/DNI/DNH  Discussion made with daughter, Beau Leo over the phone today and it was decided to pursue hospice evaluation  Upon examination, patient is lying in bed, awake, alert and in no acute distress  He states that he does not feel well  He is tired, weak and has no appetite  He denies chest pain, sob, abdominal pain, nausea, vomiting, diarrhea, fever, chills, dizziness or visual disturbance  He complains of a mild headache  The following portions of the patient's history were reviewed and updated as appropriate: allergies, current medications, past family history, past medical history, past social history, past surgical history and problem list     Review of Systems     A review of systems was performed  All negative, except as per HPI      History     Past Medical History:   Diagnosis Date    HANNA (acute kidney injury) ([facility] ) 6/20/2017    Arthritis     Cataracts, both eyes     HCAP (healthcare-associated pneumonia) 10/24/2019    Hyperlipidemia     Hypertension     Problems with hearing     Severe sepsis ([facility] ) 6/20/2017    Stroke [facility])     Syncope 10/23/2019     Past Surgical History:   Procedure Laterality Date    CAROTID ARTERY ANGIOPLASTY      CATARACT EXTRACTION       Family History   Problem Relation Age of Onset    Hypertension Mother     Hypertension Daughter      Social History     Socioeconomic History    Marital status:       Spouse name: Not on file    Number of children: Not on file    Years of education: Not on file    Highest education level: Not on file   Occupational History    Not on file   Social Needs    Financial resource strain: Not on file    Food insecurity     Worry: Not on file     Inability: Not on file    Transportation needs     Medical: Not on file     Non-medical: Not on file   Tobacco Use    Smoking status: Never Smoker    Smokeless tobacco: Never Used   Substance and Sexual Activity    Alcohol use: Never     Frequency: Never     Binge frequency: Never    Drug use: No    Sexual activity: Not Currently   Lifestyle    Physical activity     Days per week: Not on file     Minutes per session: Not on file    Stress: Not on file   Relationships    Social connections     Talks on phone: Not on file     Gets together: Not on file     Attends Synagogue service: Not on file     Active member of club or organization: Not on file     Attends meetings of clubs or organizations: Not on file     Relationship status: Not on file    Intimate partner violence     Fear of current or ex partner: Not on file     Emotionally abused: Not on file     Physically abused: Not on file     Forced sexual activity: Not on file   Other Topics Concern    Not on file   Social History Narrative    [facility]     No Known Allergies    Objective     Vital Signs  BP: 98/53     HR:63 T: 97 6    RR: 20  O2Sat: 98% RA W:131 2 lbs  General: NAD, Non-toxic appearing, frail and deconditioned, muscle mass and subcutaneous fatty tissue loss throughout  Oral: Oropharynx Moist and Clear  Neck: Supple, +ROM  CV: S1, S2, normal rate, regular rhythm, no murmur appreciated  Pulmonary: Lung sounds clear to air, no wheezing, rhonchi, rales  Abdominal:BS + x4 in all quadrants, soft, no mass, no tenderness  Extremities: No edema, +ROM, +Strength  Skin: Warm, Dry, no lesions, no rash, no erythema present, no ecchymosis present  Neurological: CN 2-12 intact, PERRLA  Psych: Awake and alert, no mood, no affect    Pertinent Laboratory/Diagnostic Studies:  02/04/2021: Glucose 85, BUN 34, Creatinine 2 09, Sodium 132, Potassium 5 1, Chloride 101, CO2 19, Albumin 2 4, Protein 5 8, eGFR 26, Hemoglobin 9 5, HCT 38 3, Platelet Count 999, WBC 19 4      Current Medications     Current Medications Reviewed and updated in Nursing Home EMR      Assessment and Plan     Episode of unresponsiveness  - Frequent episodes of unresponsiveness  - Patient on palliative focused care with no more hospitalizations  - Will discontinue Hydralazine, bp's are running soft at times which may be contributing to these episodes  - Encourage adequate PO fluid intake  - Continue supportive care    Severe protein-calorie malnutrition (HCC)  - 9 6 lb weight loss reported since 12/29/2020  - Patient states that he has no appetite  - Continue boost shakes at HS  - Will consult [address] to evaluate and treat    Acute renal failure superimposed on stage 3 chronic kidney disease ([facility] )  Lab Results   Component Value Date    EGFR 32 01/20/2021    EGFR 29 01/19/2021    EGFR 26 01/18/2021    CREATININE 1 79 (H) 01/20/2021    CREATININE 1 94 (H) 01/19/2021    CREATININE 2 09 (H) 01/18/2021   - Baseline Creatinine 1 6 to 1 8  - Creatinine currently 2 09  - Continue to avoid nephrotoxins and hypotension  - Encourage adequate hydrtion    Anemia  - Hemoglobin low at 9 5  - Continue ferrous sulfate  - No more blood work as requested by family  - Will consult hospice services to evaluate and treat    Goals of care, counseling/discussion  - Goals of care discussion with daughter, Claude Rife and it was decided to pursue comfort level of care  - Family no longer wishes for aggressive treatment measures   No more lab or diagnostic testin  - Patient a DNR/DNIDNH  - Will consult [address] [facility] to evaluate and treat for severe protein calorie malnutrition    Essential hypertension  - BP trend reviewed with frequent soft pressures reported  - Will discontinue Hydralazine  - Continue Amlodipine and Lisinopril with hold parameters  - Will continue to monitor blood pressures      [address] [facility]  02/08/2021

## 2021-02-09 NOTE — ASSESSMENT & PLAN NOTE
- Frequent episodes of unresponsiveness  - Patient on palliative focused care with no more hospitalizations  - Will discontinue Hydralazine, bp's are running soft at times which may be contributing to these episodes  - Encourage adequate PO fluid intake  - Continue supportive care

## 2021-02-09 NOTE — ASSESSMENT & PLAN NOTE
Lab Results   Component Value Date    EGFR 32 01/20/2021    EGFR 29 01/19/2021    EGFR 26 01/18/2021    CREATININE 1 79 (H) 01/20/2021    CREATININE 1 94 (H) 01/19/2021    CREATININE 2 09 (H) 01/18/2021   - Baseline Creatinine 1 6 to 1 8  - Creatinine currently 2 09  - Continue to avoid nephrotoxins and hypotension  - Encourage adequate hydrtion

## 2021-02-09 NOTE — ASSESSMENT & PLAN NOTE
- BP trend reviewed with frequent soft pressures reported  - Will discontinue Hydralazine  - Continue Amlodipine and Lisinopril with hold parameters  - Will continue to monitor blood pressures

## 2021-02-09 NOTE — ASSESSMENT & PLAN NOTE
- Goals of care discussion with daughter, Claude Rife and it was decided to pursue comfort level of care  - Family no longer wishes for aggressive treatment measures   No more lab or diagnostic testin  - Patient a DNR/DNIDNH  - Will consult [address] to evaluate and treat for severe protein calorie malnutrition

## 2021-02-09 NOTE — ASSESSMENT & PLAN NOTE
- Hemoglobin low at 9 5  - Continue ferrous sulfate  - No more blood work as requested by family  - Will consult hospice services to evaluate and treat

## 2021-02-09 NOTE — ASSESSMENT & PLAN NOTE
- 9 6 lb weight loss reported since 12/29/2020  - Patient states that he has no appetite  - Continue boost shakes at HS  - Will consult [address] to evaluate and treat

## 2021-02-11 ENCOUNTER — NURSING HOME VISIT (OUTPATIENT)
Dept: GERIATRICS | Facility: OTHER | Age: 86
End: 2021-02-11
Payer: COMMERCIAL

## 2021-02-11 DIAGNOSIS — J96.01 ACUTE RESPIRATORY FAILURE WITH HYPOXIA (HCC): ICD-10-CM

## 2021-02-11 DIAGNOSIS — R41.89 EPISODE OF UNRESPONSIVENESS: ICD-10-CM

## 2021-02-11 DIAGNOSIS — I10 ESSENTIAL HYPERTENSION: Chronic | ICD-10-CM

## 2021-02-11 DIAGNOSIS — R55 SYNCOPE, UNSPECIFIED SYNCOPE TYPE: Primary | Chronic | ICD-10-CM

## 2021-02-11 PROCEDURE — 99309 SBSQ NF CARE MODERATE MDM 30: CPT | Performed by: NURSE PRACTITIONER

## 2021-02-11 NOTE — ASSESSMENT & PLAN NOTE
- BP trend reviewed since decrease in Hydralazine with pressures between 112-145  - Will continue to monitor and consider discontinuing Lisinopril if bp's remain stable  - Continue to monitor vital signs

## 2021-02-11 NOTE — ASSESSMENT & PLAN NOTE
- Frequent episodes of unresponsiveness  - Patient on palliative focused care with no more hospitalizations  - Hydralazine discontinued,  bp's are running soft at times which may be contributing to these episodes  - Encourage adequate PO fluid intake  - Continue supportive care

## 2021-02-11 NOTE — ASSESSMENT & PLAN NOTE
- Patient has a history of syncopal episodes most likely related to dehydration and antihypertensive therapy  - Hydralazine discontinued on 02/09/2021 with no hypertension reported  - Will consider discontinuing Lisinopril if bp's remain stable

## 2021-02-11 NOTE — PROGRESS NOTES
[facility]  [address]  [address]  (920) [address]   Acute Visit Note  POS 32      NAME: Madai Peterson  AGE: 80 y o  SEX: male  :  1927  DATE OF ENCOUNTER: 2021    Chief Complaint   Patient seen and examined for syncope    History of Present Illness   Fernie Khoury is a 81 yo male patient with underlying essential hypertension, carotid artery stenosis, s/p carotid endarterectomy, hx of basal ganglia stroke, GERD, HLD, CKD4, sinus bradycardia, arthritis, admitted originally to [facility] -20 after a syncopal episode at his PCPs office      He was admitted to [address] [place] [address] on   He had been participating in a comprehensive therapy course at the facility, during his stay he had one more syncopal episode and a presyncopal episode within 24 hours  Had an EKG that showed NSR with PACs and low voltage  Patient and daughters had declined hospitalization for further work up and wanted to pursue comfort measures      He tested positive for Covid 19 on 20 and was noted to have acute change in mental status since diagnosis that had continued to worsen  on  he was sent back to the hospital after an episode of hypoxia  found to have hazy opacities over L mid lung field  He was admitted to the hospital with Covid 19 sepsis, completed a 5 day course of Remdesivir and a 7 day course of Ceftriaxone  Completed systemic steroid therapy      Inflammatory markers had improved  Again readmitted to [facility] for rehab following that hospital stay  He conitnued to decline with generalized weakness and poor oral intake  On 1/10 he had a syncopal episode and was sent out to the ED for further evaluation  In the ED found to be hyponatremic, meeting sepsis criteria with leukocytosis to 20 4 and tachycardia  Chest Xray showed new R upper lobe opacification  Completed a 7 day course of ceftriaxone   Blood cultures remained negative x 72 hrs      He was noted to be confused during the hospital stay, seems like his confusion has only been getting worse since diagnosis of Covid 19 in December 2020  Once considered medically stable, he was discharged back to [facility] for subacute rehab services  On 01/28/2021, he was noted to have a low grade fever and lightheadedness upon ambulating to the bathroom  A stat Chest Xray was done and found to have new L sided infiltrate  Started on PO doxycycline  On review of CBC on 02/04/2021, patients WBC's remain elevated at 19 4 after antibiotic therapy  He also remains hyponatremic with Sodium level of 132  Patient seen and examined per nursing request today for a syncopal episode  He is scheduled to be evaluated by hospice services tomorrow 02/12/2021  Upon examination at bedside, patient appears lethargic but is arousable  He denies chest pain, sob, abdominal pain, nausea, vomiting, diarrhea, headache, dizziness or visual disturbance  He offers no complaints  He asked if he was going to die and states that he is ok with dying and he is ready  The following portions of the patient's history were reviewed and updated as appropriate: allergies, current medications, past family history, past medical history, past social history, past surgical history and problem list     Review of Systems     A review of systems was performed  All negative, except as per HPI      History     Past Medical History:   Diagnosis Date    [facility] ) 6/20/2017    Arthritis     Cataracts, both eyes     HCAP (healthcare-associated pneumonia) 10/24/2019    Hyperlipidemia     Hypertension     Problems with hearing     Severe sepsis ([facility] ) 6/20/2017    Stroke ([facility] )     Syncope 10/23/2019     Past Surgical History:   Procedure Laterality Date    CAROTID ARTERY ANGIOPLASTY      CATARACT EXTRACTION       Family History   Problem Relation Age of Onset    Hypertension Mother     Hypertension Daughter      Social History Socioeconomic History    Marital status:       Spouse name: Not on file    Number of children: Not on file    Years of education: Not on file    Highest education level: Not on file   Occupational History    Not on file   Social Needs    Financial resource strain: Not on file    Food insecurity     Worry: Not on file     Inability: Not on file    Transportation needs     Medical: Not on file     Non-medical: Not on file   Tobacco Use    Smoking status: Never Smoker    Smokeless tobacco: Never Used   Substance and Sexual Activity    Alcohol use: Never     Frequency: Never     Binge frequency: Never    Drug use: No    Sexual activity: Not Currently   Lifestyle    Physical activity     Days per week: Not on file     Minutes per session: Not on file    Stress: Not on file   Relationships    Social connections     Talks on phone: Not on file     Gets together: Not on file     Attends [employer] service: Not on file     Active member of club or organization: Not on file     Attends meetings of clubs or organizations: Not on file     Relationship status: Not on file    Intimate partner violence     Fear of current or ex partner: Not on file     Emotionally abused: Not on file     Physically abused: Not on file     Forced sexual activity: Not on file   Other Topics Concern    Not on file   Social History Narrative    From [facility]     No Known Allergies    Objective     Vital Signs  BP: 112/71       HR: 76 T: 98 1    RR: 22 O2Sat: 93% RA W: 132 kbs  General: NAD, Non-toxic appearing, frail and deconditioned, muscle and subcutaneous fatty tissue loss throughout  Oral: Oropharynx Moist and Clear  Neck: Supple, +ROM  CV: S1, S2, normal rate, regular rhythm, no murmur appreciated  Pulmonary: Lung sounds clear to air, no wheezing, rhonchi, rales, diminished in bilateral bases  Abdominal:BS + x4 in all quadrants, soft, no mass, no tenderness  Extremities: No edema, +ROM, +Strength  Skin: Warm, Dry, no lesions, no rash, no erythema present, no ecchymosis present  Neurological: CN 2-12 intact, PERRLA  Psych: Awake and alert, no mood, no affect    Pertinent Laboratory/Diagnostic Studies:  02/04/2021: Glucose 85, BUN 34, Creatinine 2 09, Sodium 132, Potassium 5 1, Chloride 101, CO2 19, Albumin 2 4, Protein 5 8, eGFR 26, Hemoglobin 9 5, HCT 38 3, Platelet Count 043, WBC 19 4         Current Medications     Current Medications Reviewed and updated in Nursing Home EMR      Assessment and Plan     Syncope  - Patient has a history of syncopal episodes most likely related to dehydration and antihypertensive therapy  - Hydralazine discontinued on 02/09/2021 with no hypertension reported  - Will consider discontinuing Lisinopril if bp's remain stable    Essential hypertension  - BP trend reviewed since decrease in Hydralazine with pressures between 112-145  - Will continue to monitor and consider discontinuing Lisinopril if bp's remain stable  - Continue to monitor vital signs    Episode of unresponsiveness  - Frequent episodes of unresponsiveness  - Patient on palliative focused care with no more hospitalizations  - Hydralazine discontinued,  bp's are running soft at times which may be contributing to these episodes  - Encourage adequate PO fluid intake  - Continue supportive care    Acute respiratory failure with hypoxia (HCC)  - Status post COVID 19 pneumonia with significant physical deconditioning  - Patient now requiring oxygen nasal cannula for comfort  - Continue to monitor oxygen saturation, keep sats greater than 92%  - Morphine ordered every 4 hours prn for sob/pain      [address] [facility]  2/11/2021

## 2021-02-11 NOTE — ASSESSMENT & PLAN NOTE
- Status post COVID 19 pneumonia with significant physical deconditioning  - Patient now requiring oxygen nasal cannula for comfort  - Continue to monitor oxygen saturation, keep sats greater than 92%  - Morphine ordered every 4 hours prn for sob/pain

## 2021-02-12 DIAGNOSIS — Z23 ENCOUNTER FOR IMMUNIZATION: ICD-10-CM

## 2021-02-12 NOTE — UTILIZATION REVIEW
Requesting review for 01/19/2021- determination   Review for  2 additional days  Continued Stay Review    Date: 1/15/21                        Current Patient Class: IP Current Level of Care: MS    HPI:89 y o  male initially admitted on 1/10 with Pneumonia, sepsis, acute encephalopathy, ARF on CKD stage 3, previous Covid infection    Assessment/Plan:   Pt remains on IV antibiotics  So far blood cultures and urine culture are negative  Pt had an episode of tachycardia today  No hypoxia in last 48 hrs  Pertinent Labs/Diagnostic Results:                                                                   Vital Signs:   01/15/21 08:41:59  98 5 °F (36 9 °C)  107Abnormal   --  169/84  112  93 %  --   01/15/21 07:50:14  --  124Abnormal   16  99/72  81  96 %  --   01/15/21 0730  --  --  --  --  --  --  None (Room air)   01/14/21 22:00:46  98 6 °F (37 °C)  104  16  157/84  108  94 %  --   01/14/21 2015  --  --  --  --  --  --  None (Room air)   01/14/21 15:33:08  98 5 °F (36 9 °C)  99  18  161/83  109  95 %  --   01/14/21 0900  97 9 °F (36 6 °C)  112Abnormal   20  156/94  115  95 %  None (Room air)   01/13/21 22:58:09  98 2 °F (36 8 °C)  92  20  150/85  107  96 %  --   01/13/21 1915  --  --  --  --  --  --  None (Room air)   01/13/21 15:35:37  98 7 °F (37 1 °C)  97  22  148/66  93  94 %  --   01/13/21 10:55:49  --  103  16  128/94  105  91 %  --   01/13/21 09:36:53  --  99  --  126/71  89  96 %  --   01/13/21 0810  --  --  --  --  --  91 %  --   01/13/21 07:25:05  98 4 °F (36 9 °C)  94  16  109/59  76  88 %Abnormal   --   01/13/21 03:00:29  98 4 °F (36 9 °C)  99  20  128/69  89  90 %  --     Medications:   Scheduled Medications:  No current facility-administered medications for this encounter  Continuous IV Infusions:  No current facility-administered medications for this encounter     IV fluids d/c 1/14    PRN Meds:  acetaminophen, 650 mg, Oral, Q6H PRN  aluminum-magnesium hydroxide-simethicone, 30 mL, Oral, Q6H PRN  ondansetron, 4 mg, Intravenous, Q6H PRN  Ocean nasal spray PRN q 1 hr  - x 1 1/14 and d/c     Discharge Plan: rehab    Network Utilization Review Department  ATTENTION: Please call with any questions or concerns to [phone] and carefully listen to the prompts so that you are directed to the right person  All voicemails are confidential   Rolando Hallmark all requests for admission clinical reviews, approved or denied determinations and any other requests to dedicated fax number below belonging to the campus where the patient is receiving treatment   List of dedicated fax numbers for the Facilities:  [address] DENIALS (Administrative/Medical Necessity) [phone]   [address] (Maternity/NICU/Pediatrics) [phone]   [address] Armani Rowell 4215 (  Dariusz Contreras "Bianca" 103) [address] [place] [address] Annetta Melendez 14[address] [place] [address] [phone]

## 2021-03-04 ENCOUNTER — NURSING HOME VISIT (OUTPATIENT)
Dept: GERIATRICS | Facility: OTHER | Age: 86
End: 2021-03-04
Payer: MEDICARE

## 2021-03-04 DIAGNOSIS — R26.2 AMBULATORY DYSFUNCTION: ICD-10-CM

## 2021-03-04 DIAGNOSIS — N17.9 ACUTE RENAL FAILURE SUPERIMPOSED ON STAGE 3B CHRONIC KIDNEY DISEASE, UNSPECIFIED ACUTE RENAL FAILURE TYPE (HCC): ICD-10-CM

## 2021-03-04 DIAGNOSIS — I10 ESSENTIAL HYPERTENSION: Primary | Chronic | ICD-10-CM

## 2021-03-04 DIAGNOSIS — J96.01 ACUTE RESPIRATORY FAILURE WITH HYPOXIA (HCC): ICD-10-CM

## 2021-03-04 DIAGNOSIS — F41.9 ANXIETY: ICD-10-CM

## 2021-03-04 DIAGNOSIS — G89.29 CHRONIC BILATERAL LOW BACK PAIN WITHOUT SCIATICA: ICD-10-CM

## 2021-03-04 DIAGNOSIS — N18.32 ACUTE RENAL FAILURE SUPERIMPOSED ON STAGE 3B CHRONIC KIDNEY DISEASE, UNSPECIFIED ACUTE RENAL FAILURE TYPE (HCC): ICD-10-CM

## 2021-03-04 DIAGNOSIS — M54.50 CHRONIC BILATERAL LOW BACK PAIN WITHOUT SCIATICA: ICD-10-CM

## 2021-03-04 DIAGNOSIS — K21.9 GASTROESOPHAGEAL REFLUX DISEASE WITHOUT ESOPHAGITIS: ICD-10-CM

## 2021-03-04 PROCEDURE — 99309 SBSQ NF CARE MODERATE MDM 30: CPT | Performed by: NURSE PRACTITIONER

## 2021-03-06 PROBLEM — F41.9 ANXIETY: Status: ACTIVE | Noted: 2021-03-04

## 2021-03-06 RX ORDER — AMOXICILLIN 250 MG
2 CAPSULE ORAL DAILY
COMMUNITY

## 2021-03-06 RX ORDER — LORAZEPAM 2 MG/ML
0.5 CONCENTRATE ORAL
COMMUNITY
End: 2021-03-27 | Stop reason: SDUPTHER

## 2021-03-06 NOTE — PROGRESS NOTES
[facility]   [address] [facility] [address]   ([address] [facility]) [place] [address] [place]   Progress Note   POS 32     NAME: Huan Pantoja AGE: 80 y o  SEX: male   : 1927   DATE OF ENCOUNTER: 2021     Chief Complaint   Patient seen and examined to follow-up on acute and chronic medical conditions     History of Present Illness   Douglas Starks is a 81 yo male patient with underlying essential hypertension, carotid artery stenosis, s/p carotid endarterectomy, hx of basal ganglia stroke, GERD, HLD, CKD4, sinus bradycardia, arthritis, admitted originally to [facility] [place] -20 after a syncopal episode at his PCPs office  He was admitted to [facility] on   He had been participating in a comprehensive therapy course at the facility, during his stay he had one more syncopal episode and a presyncopal episode within 24 hours  Had an EKG that showed NSR with PACs and low voltage  Patient and daughters had declined hospitalization for further work up and wanted to pursue comfort measures  He tested positive for Covid 19 on 20 and was noted to have acute change in mental status since diagnosis that had continued to worsen  on  he was sent back to the hospital after an episode of hypoxia  found to have hazy opacities over L mid lung field  He was admitted to the hospital with Covid 19 sepsis, completed a 5 day course of Remdesivir and a 7 day course of Ceftriaxone  Completed systemic steroid therapy  Inflammatory markers had improved  Again readmitted to [facility] for rehab following that hospital stay  He conitnued to decline with generalized weakness and poor oral intake  On 1/10 he had a syncopal episode and was sent out to the ED for further evaluation  In the ED found to be hyponatremic, meeting sepsis criteria with leukocytosis to 20 4 and tachycardia  Chest Xray showed new R upper lobe opacification  Completed a 7 day course of ceftriaxone   Blood cultures remained negative x 72 hrs  He was noted to be confused during the hospital stay, seems like his confusion has only been getting worse since diagnosis of Covid 23 in December 2020  Once considered medically stable, he was discharged back to [facility] for subacute rehab services  On 01/28/2021, he was noted to have a low grade fever and lightheadedness upon ambulating to the bathroom  A stat Chest Xray was done and found to have new L sided infiltrate  Started on PO doxycycline  On review of CBC on 02/04/2021, patients WBC's remain elevated at 19 4 after antibiotic therapy  He also remaine hyponatremic with Sodium level of 132  He has been experiencing frequent unresponsive episodes in STR, patient and family no longer wished for aggressive treatment and wanted to pursue comfort care  Hospice services was consulted and patient was admitted to their services  Patient is seen and examined today to follow-up on acute and chronic medical conditions  He denies chest pain, sob, abdominal pain, nausea, vomiting, diarrhea, headache, dizziness or visual disturbance  He offers no complaints  Per nursing, patient does exhibit periods of anxiety that is worse at night  He is now taking a small dose of Lorazepam with improvement  Nursing reports that he suffered a fall on 02/28/21 with no injuries noted  The following portions of the patient's history were reviewed and updated as appropriate: allergies, current medications, past family history, past medical history, past social history, past surgical history and problem list      Review of Systems   A review of systems was performed  All negative, except as per HPI     History   Past Medical History:   Diagnosis Date    [facility]    Arthritis    Cataracts, both eyes    HCAP (healthcare-associated pneumonia) 10/24/2019    Hyperlipidemia    Hypertension    Problems with hearing    [facility] 6[facility]2017    [facility]    Syncope 10/23/2019   Past Surgical History:   Procedure Laterality Date    CAROTID ARTERY ANGIOPLASTY    CATARACT EXTRACTION   Family History   Problem Relation Age of Onset    Hypertension Mother    Hypertension Daughter   Social History   Socioeconomic History    Marital status:     Spouse name: Not on file    Number of children: Not on file    Years of education: Not on file    Highest education level: Not on file   Occupational History    Not on file   Social Needs    Financial resource strain: Not on file    Food insecurity   Worry: Not on file   Inability: Not on file    Transportation needs   Medical: Not on file   Non-medical: Not on file   Tobacco Use    Smoking status: Never Smoker    Smokeless tobacco: Never Used   Substance and Sexual Activity    Alcohol use: Never   Frequency: Never   Binge frequency: Never    Drug use: No    Sexual activity: Not Currently   Lifestyle    Physical activity   Days per week: Not on file   Minutes per session: Not on file    Stress: Not on file   Relationships    Social connections   Talks on phone: Not on file   Gets together: Not on file   Attends Alevism service: Not on file   Active member of club or organization: Not on file   Attends meetings of clubs or organizations: Not on file   Relationship status: Not on file    Intimate partner violence   Fear of current or ex partner: Not on file   Emotionally abused: Not on file   Physically abused: Not on file   Forced sexual activity: Not on file   Other Topics Concern    Not on file   Social History Narrative   From [facility]     No Known Allergies     Objective   Vital Signs   BP: 137/82 HR: 80 T: 97 3 RR: 22 O2Sat: 96% RA  W: 129 8 lbs   General: NAD, Non-toxic appearing, frail and deconditioned, muscle and subcutaneous fatty tissue loss throughout   Oral: Oropharynx Moist and Clear   Neck: Supple, +ROM   CV: S1, S2, normal rate, regular rhythm, no murmur appreciated   Pulmonary: Lung sounds clear to air, no wheezing, rhonchi, rales, diminished in bilateral bases   Abdominal:BS + x4 in all quadrants, soft, no mass, no tenderness   Extremities: No edema, +ROM, +Strength   Skin: Warm, Dry, no lesions, no rash, no erythema present, no ecchymosis present   Neurological: CN 2-12 intact, PERRLA   Psych: Awake and alert, no mood, no affect     Pertinent Laboratory/Diagnostic Studies:   02/04/2021: Glucose 85, BUN 34, Creatinine 2 09, Sodium 132, Potassium 5 1, Chloride 101, CO2 19, Albumin 2 4, Protein 5 8, eGFR 26, Hemoglobin 9 5, HCT 38 3, Platelet Count 543, WBC 19 4       Current Medications   Current Medications Reviewed and updated in Nursing Home EMR       Assessment and Plan      Essential hypertension   - BP trend reviewed and remains stable and controlled   - Continue Amlodipine 10 mg PO daily   - Continue to monitor vital signs     Acute respiratory failure with hypoxia (HCC)   - Status post COVID 19 pneumonia with significant physical deconditioning   - Patient now requiring oxygen nasal cannula for comfort   - Continue to monitor oxygen saturation, keep sats greater than 92%   - Morphine ordered every 4 hours prn for sob/pain    Anxiety  - Increased anxiety reported at night  - Low dose Lorazepam ordered with improvement reported  - Continue supportive care with frequent reassurance    Acute renal failure superimposed on stage 3 chronic kidney disease (HCC)  - Baseline creatinine 1 6 to 1 8  - Last creatinine 2 09  - Patient now on hospice services, no more lab work indicated to promote comfort  - Continue to avoid nephrotoxins and hypotension as possible    Back pain   - Patient with chronic low back pain reported  - Continue Icy Hot gel, Tylenol and prn Morphine    Ambulatory Dysfunction  - Patient suffered a fall on 02/28/2021 with no reported injuries  - Continue to assist patient when OOB with RW  - Continue fall precautions    GERD  - Stable, continue Omeprazole 20 mg PO daily     [address] [facility]   Geriatric Medicine   03/04/2021

## 2021-03-12 ENCOUNTER — NURSING HOME VISIT (OUTPATIENT)
Dept: GERIATRICS | Facility: OTHER | Age: 86
End: 2021-03-12
Payer: MEDICARE

## 2021-03-12 DIAGNOSIS — I10 ESSENTIAL HYPERTENSION: Chronic | ICD-10-CM

## 2021-03-12 DIAGNOSIS — G62.9 NEUROPATHY: Primary | ICD-10-CM

## 2021-03-12 PROCEDURE — 99308 SBSQ NF CARE LOW MDM 20: CPT | Performed by: NURSE PRACTITIONER

## 2021-03-12 RX ORDER — GABAPENTIN 100 MG/1
100 CAPSULE ORAL
COMMUNITY

## 2021-03-13 NOTE — PROGRESS NOTES
[address] [address]  ([phone]   Acute Visit Note  POS 31        NAME: Rian Evans  AGE: 80 y o  SEX: male  :  1927  DATE OF ENCOUNTER: 3/12/2021    Chief Complaint   Patient seen and examined for numbness of tingling in fingers of bilateral hands    History of Present Illness     Libra Díaz is a 80year old male patient of [facility] under hospice services seen and examined today for complaint of numbness and tingling in fingers of bilateral hands  He states that for the past several weeks his fingers have been numb making it difficult to hold onto anything  He currently denies bilateral hand pain but states that this sensation causes him anxiety  He does perform  hand exercises with no improvement  He denies chest pain, sob, fever, chills, abdominal pain, headache, dizziness or visual disturbance  The following portions of the patient's history were reviewed and updated as appropriate: allergies, current medications, past family history, past medical history, past social history, past surgical history and problem list     Review of Systems     A review of systems was performed  All negative, except as per HPI  History     Past Medical History:   Diagnosis Date    [facility] 2017    Arthritis     Cataracts, both eyes     HCAP (healthcare-associated pneumonia) 10/24/2019    Hyperlipidemia     Hypertension     Problems with hearing     Severe sepsis ([facility] ) 2017    [email or web address]     Syncope 10/23/2019     Past Surgical History:   Procedure Laterality Date    CAROTID ARTERY ANGIOPLASTY      CATARACT EXTRACTION       Family History   Problem Relation Age of Onset    Hypertension Mother     Hypertension Daughter      Social History     Socioeconomic History    Marital status:       Spouse name: Not on file    Number of children: Not on file    Years of education: Not on file    Highest education level: Not on file   Occupational History    Not on file   Social Needs    Financial resource strain: Not on file    Food insecurity     Worry: Not on file     Inability: Not on file    Transportation needs     Medical: Not on file     Non-medical: Not on file   Tobacco Use    Smoking status: Never Smoker    Smokeless tobacco: Never Used   Substance and Sexual Activity    Alcohol use: Never     Frequency: Never     Binge frequency: Never    Drug use: No    Sexual activity: Not Currently   Lifestyle    Physical activity     Days per week: Not on file     Minutes per session: Not on file    Stress: Not on file   Relationships    Social connections     Talks on phone: Not on file     Gets together: Not on file     Attends Tenriism service: Not on file     Active member of club or organization: Not on file     Attends meetings of clubs or organizations: Not on file     Relationship status: Not on file    Intimate partner violence     Fear of current or ex partner: Not on file     Emotionally abused: Not on file     Physically abused: Not on file     Forced sexual activity: Not on file   Other Topics Concern    Not on file   Social History Narrative    From [facility]     No Known Allergies    Objective     Vital Signs  BP: 152/91       HR 80  T: 97 8     RR:  17   O2Sat: 94% RA W 129 8 lbs   General: NAD, Non-toxic appearing, frail and decondiione  CV: S1, S2, normal rate, regular rhythm, no murmur appreciated  Pulmonary: Lung sounds clear to air, no wheezing, rhonchi, rales  Abdominal:BS + x4 in all quadrants, soft, no mass, no tenderness  Extremities: No edema, +ROM, +Strength  Skin: Warm, Dry, no lesions, no rash, no erythema present, no ecchymosis present  Neurological: CN 2-12 intact, PERRLA  Psych: Awake and alert, no mood, no affect    Pertinent Laboratory/Diagnostic Studies:  No further lab or diagnostic testing indicated, patient is under hospice level of care        Current Medications     Current Medications Reviewed and updated in Nursing Home EMR      Assessment and Plan     Neuropathy  - Patient complains of numbness and tingling in his fingers of his bilateral hands  - No neuro-focal deficits noted on examination  - Will order Gabapentin 100 mg PO Q HS  - Continue hand exercises      Essential hypertension  - BP trend reviewed with occasional elevated bp's noted  - Continue Amlodipine 10 mg PO daily  - Will consider restarting Lisinopril if bp's remain elevated  - Will continue to monitor vital signs      [address] [facility]  3/12/2021

## 2021-03-13 NOTE — ASSESSMENT & PLAN NOTE
- Patient complains of numbness and tingling in his fingers of his bilateral hands  - No neuro-focal deficits noted on examination  - Will order Gabapentin 100 mg PO Q HS  - Continue hand exercises

## 2021-03-27 DIAGNOSIS — Z51.5 HOSPICE CARE PATIENT: Primary | ICD-10-CM

## 2021-03-27 RX ORDER — MORPHINE SULFATE 100 MG/5ML
SOLUTION, CONCENTRATE ORAL
Qty: 30 ML | Refills: 0 | Status: SHIPPED | OUTPATIENT
Start: 2021-03-27

## 2021-03-27 RX ORDER — LORAZEPAM 2 MG/ML
CONCENTRATE ORAL
Qty: 30 ML | Refills: 0 | Status: SHIPPED | OUTPATIENT
Start: 2021-03-27

## 2025-01-14 NOTE — ASSESSMENT & PLAN NOTE
- BP trend reviewed with occasional elevated bp's noted  - Continue Amlodipine 10 mg PO daily  - Will consider restarting Lisinopril if bp's remain elevated  - Will continue to monitor vital signs yes